# Patient Record
Sex: FEMALE | Race: WHITE | NOT HISPANIC OR LATINO
[De-identification: names, ages, dates, MRNs, and addresses within clinical notes are randomized per-mention and may not be internally consistent; named-entity substitution may affect disease eponyms.]

---

## 2018-03-20 ENCOUNTER — TRANSCRIPTION ENCOUNTER (OUTPATIENT)
Age: 78
End: 2018-03-20

## 2018-03-20 NOTE — ASU PATIENT PROFILE, ADULT - PMH
Anxiety    History of osteoarthritis  knees  Hypertension    Hypothyroid    Morbid obesity    Type 2 diabetes mellitus    URI (upper respiratory infection)  February 2018

## 2018-03-20 NOTE — ASU PATIENT PROFILE, ADULT - PSH
Elective surgery  insertion of pessary  History of arthroscopy  left knee  S/P T&A (status post tonsillectomy and adenoidectomy)    Status post dilation and curettage    Status post foot surgery  left ankle

## 2018-03-21 ENCOUNTER — OUTPATIENT (OUTPATIENT)
Dept: OUTPATIENT SERVICES | Facility: HOSPITAL | Age: 78
LOS: 1 days | End: 2018-03-21
Payer: MEDICARE

## 2018-03-21 VITALS
SYSTOLIC BLOOD PRESSURE: 126 MMHG | HEART RATE: 54 BPM | RESPIRATION RATE: 16 BRPM | OXYGEN SATURATION: 99 % | DIASTOLIC BLOOD PRESSURE: 56 MMHG

## 2018-03-21 VITALS
HEIGHT: 65 IN | OXYGEN SATURATION: 97 % | SYSTOLIC BLOOD PRESSURE: 152 MMHG | RESPIRATION RATE: 18 BRPM | HEART RATE: 54 BPM | WEIGHT: 273.37 LBS | TEMPERATURE: 98 F | DIASTOLIC BLOOD PRESSURE: 50 MMHG

## 2018-03-21 DIAGNOSIS — Z41.9 ENCOUNTER FOR PROCEDURE FOR PURPOSES OTHER THAN REMEDYING HEALTH STATE, UNSPECIFIED: Chronic | ICD-10-CM

## 2018-03-21 DIAGNOSIS — H25.11 AGE-RELATED NUCLEAR CATARACT, RIGHT EYE: ICD-10-CM

## 2018-03-21 DIAGNOSIS — Z98.890 OTHER SPECIFIED POSTPROCEDURAL STATES: Chronic | ICD-10-CM

## 2018-03-21 DIAGNOSIS — Z90.89 ACQUIRED ABSENCE OF OTHER ORGANS: Chronic | ICD-10-CM

## 2018-03-21 LAB — GLUCOSE BLDC GLUCOMTR-MCNC: 129 MG/DL — HIGH (ref 70–99)

## 2018-03-21 PROCEDURE — V2632: CPT

## 2018-03-21 PROCEDURE — 66984 XCAPSL CTRC RMVL W/O ECP: CPT | Mod: RT

## 2018-03-21 PROCEDURE — 82962 GLUCOSE BLOOD TEST: CPT

## 2018-03-21 NOTE — ASU DISCHARGE PLAN (ADULT/PEDIATRIC). - NOTIFY
Swelling that continues/Fever greater than 101/Pain not relieved by Medications/Persistent Nausea and Vomiting/Bleeding that does not stop

## 2018-03-21 NOTE — ASU DISCHARGE PLAN (ADULT/PEDIATRIC). - SPECIAL INSTRUCTIONS
Vigamox 1 drop right eye 4x/day  Prolensa 1 drop right eye 1x/day  PredForte of Durezol 1 drop right eye 4x/day

## 2018-05-30 NOTE — H&P ADULT - ASSESSMENT
76 yo F with PMHx of HTN,HLD, DM, has been c/o SHANKS. recent stress test showed apical wall ischemia. Pt referred for LHC with possible intervention.

## 2018-05-30 NOTE — H&P ADULT - PROBLEM SELECTOR PLAN 1
Pt is referred for Lt heart cath/possible PCI. Labs & medications are reviewed. Informed consent obtained after discussion of [facility] risks, benefits and alternatives  with patient. Risk discussed included, but not limited to MI, stroke, mortality, major bleeding, arrhythmia, or infection.  An educational material provided. Pt. verbalizes understandings of pre-procedural instructions.

## 2018-05-31 ENCOUNTER — OUTPATIENT (OUTPATIENT)
Dept: OUTPATIENT SERVICES | Facility: HOSPITAL | Age: 78
LOS: 1 days | Discharge: HOME | End: 2018-05-31

## 2018-05-31 VITALS
HEIGHT: 65 IN | WEIGHT: 265 LBS | SYSTOLIC BLOOD PRESSURE: 156 MMHG | TEMPERATURE: 98 F | DIASTOLIC BLOOD PRESSURE: 67 MMHG | HEART RATE: 55 BPM | OXYGEN SATURATION: 99 % | RESPIRATION RATE: 16 BRPM

## 2018-05-31 VITALS — SYSTOLIC BLOOD PRESSURE: 135 MMHG | DIASTOLIC BLOOD PRESSURE: 54 MMHG

## 2018-05-31 DIAGNOSIS — Z98.890 OTHER SPECIFIED POSTPROCEDURAL STATES: Chronic | ICD-10-CM

## 2018-05-31 DIAGNOSIS — Z90.89 ACQUIRED ABSENCE OF OTHER ORGANS: Chronic | ICD-10-CM

## 2018-05-31 DIAGNOSIS — Z41.9 ENCOUNTER FOR PROCEDURE FOR PURPOSES OTHER THAN REMEDYING HEALTH STATE, UNSPECIFIED: Chronic | ICD-10-CM

## 2018-05-31 LAB
ANION GAP SERPL CALC-SCNC: 9 MMOL/L — SIGNIFICANT CHANGE UP (ref 5–17)
BUN SERPL-MCNC: 10 MG/DL — SIGNIFICANT CHANGE UP (ref 7–23)
CALCIUM SERPL-MCNC: 9.2 MG/DL — SIGNIFICANT CHANGE UP (ref 8.5–10.1)
CHLORIDE SERPL-SCNC: 103 MMOL/L — SIGNIFICANT CHANGE UP (ref 96–108)
CO2 SERPL-SCNC: 28 MMOL/L — SIGNIFICANT CHANGE UP (ref 22–31)
CREAT SERPL-MCNC: 0.63 MG/DL — SIGNIFICANT CHANGE UP (ref 0.5–1.3)
GLUCOSE SERPL-MCNC: 165 MG/DL — HIGH (ref 70–99)
HCT VFR BLD CALC: 40.9 % — SIGNIFICANT CHANGE UP (ref 34.5–45)
HGB BLD-MCNC: 13.5 G/DL — SIGNIFICANT CHANGE UP (ref 11.5–15.5)
MCHC RBC-ENTMCNC: 28.8 PG — SIGNIFICANT CHANGE UP (ref 27–34)
MCHC RBC-ENTMCNC: 33 GM/DL — SIGNIFICANT CHANGE UP (ref 32–36)
MCV RBC AUTO: 87.4 FL — SIGNIFICANT CHANGE UP (ref 80–100)
NRBC # BLD: 0 /100 WBCS — SIGNIFICANT CHANGE UP (ref 0–0)
PLATELET # BLD AUTO: 208 K/UL — SIGNIFICANT CHANGE UP (ref 150–400)
POTASSIUM SERPL-MCNC: 3.7 MMOL/L — SIGNIFICANT CHANGE UP (ref 3.5–5.3)
POTASSIUM SERPL-SCNC: 3.7 MMOL/L — SIGNIFICANT CHANGE UP (ref 3.5–5.3)
RBC # BLD: 4.68 M/UL — SIGNIFICANT CHANGE UP (ref 3.8–5.2)
RBC # FLD: 13.8 % — SIGNIFICANT CHANGE UP (ref 10.3–14.5)
SODIUM SERPL-SCNC: 140 MMOL/L — SIGNIFICANT CHANGE UP (ref 135–145)
WBC # BLD: 9.03 K/UL — SIGNIFICANT CHANGE UP (ref 3.8–10.5)
WBC # FLD AUTO: 9.03 K/UL — SIGNIFICANT CHANGE UP (ref 3.8–10.5)

## 2018-05-31 NOTE — PACU DISCHARGE NOTE - COMMENTS
pt for d/c home.  d/c instructions along with medication changes reviewed with patient and daughter.  Patient verbalizes understanding.  IV removed from left hand.  pt escorted by daughter

## 2018-05-31 NOTE — CHART NOTE - NSCHARTNOTEFT_GEN_A_CORE
Nurse Practitioner Progress note:     HPI:  78 yo F with PMHx of HTN,HLD, DM, has been c/o SHANKS. recent stress test showed apical wall ischemia. Pt referred for R&LHC with possible intervention.     s/p Rt & LHC : Normal coronaries, mod pul HTN, PCWP 24.  Pt denies chest pain/SOB/palpitations.    PHYSICAL EXAM:  V/S:  BP           HR         RR  Neurologic: Non-focal, A&Ox3.  No neuro deficits  Cardiac : (+)S1S2,RRR  Lungs: CTA B/L  Procedure Site: Rt. femoral perclose closure device noted. site benign soft no bleeding no hematoma 2+DP      PLAN: 	  -VS, labs, diet, activity as per post cath orders  -Encourage PO fluids  -Continue current medications  -Pt. to be discharged home today  -Plan of care D/W pt. and Dr. De Leon  -Post cath instructions reviewed with pt. then pt. verbalizes understanding   -Follow-up with Dr. Ibanez in 1-2 weeks.

## 2018-06-05 DIAGNOSIS — I20.9 ANGINA PECTORIS, UNSPECIFIED: ICD-10-CM

## 2018-06-05 DIAGNOSIS — I50.32 CHRONIC DIASTOLIC (CONGESTIVE) HEART FAILURE: ICD-10-CM

## 2018-06-05 DIAGNOSIS — F41.9 ANXIETY DISORDER, UNSPECIFIED: ICD-10-CM

## 2018-06-05 DIAGNOSIS — I10 ESSENTIAL (PRIMARY) HYPERTENSION: ICD-10-CM

## 2018-06-05 DIAGNOSIS — E66.01 MORBID (SEVERE) OBESITY DUE TO EXCESS CALORIES: ICD-10-CM

## 2018-06-05 DIAGNOSIS — E11.9 TYPE 2 DIABETES MELLITUS WITHOUT COMPLICATIONS: ICD-10-CM

## 2018-06-05 DIAGNOSIS — I27.20 PULMONARY HYPERTENSION, UNSPECIFIED: ICD-10-CM

## 2018-06-05 DIAGNOSIS — E03.9 HYPOTHYROIDISM, UNSPECIFIED: ICD-10-CM

## 2018-09-26 PROBLEM — Z87.39 PERSONAL HISTORY OF OTHER DISEASES OF THE MUSCULOSKELETAL SYSTEM AND CONNECTIVE TISSUE: Chronic | Status: ACTIVE | Noted: 2018-03-21

## 2018-09-26 PROBLEM — E03.9 HYPOTHYROIDISM, UNSPECIFIED: Chronic | Status: ACTIVE | Noted: 2018-03-21

## 2018-09-26 PROBLEM — I10 ESSENTIAL (PRIMARY) HYPERTENSION: Chronic | Status: ACTIVE | Noted: 2018-03-21

## 2018-09-26 PROBLEM — E11.9 TYPE 2 DIABETES MELLITUS WITHOUT COMPLICATIONS: Chronic | Status: ACTIVE | Noted: 2018-03-21

## 2018-09-26 PROBLEM — F41.9 ANXIETY DISORDER, UNSPECIFIED: Chronic | Status: ACTIVE | Noted: 2018-03-21

## 2018-09-26 PROBLEM — E66.01 MORBID (SEVERE) OBESITY DUE TO EXCESS CALORIES: Chronic | Status: ACTIVE | Noted: 2018-03-21

## 2018-10-02 ENCOUNTER — TRANSCRIPTION ENCOUNTER (OUTPATIENT)
Age: 78
End: 2018-10-02

## 2018-10-02 NOTE — ASU PATIENT PROFILE, ADULT - PMH
Anxiety    History of osteoarthritis  knees  Hypertension    Hypothyroid    Morbid obesity    Type 2 diabetes mellitus    URI (upper respiratory infection)  February 2018 Anxiety    History of osteoarthritis  knees  Hypertension    Hypothyroid    Morbid obesity    Panic attacks    Type 2 diabetes mellitus    URI (upper respiratory infection)  February 2018

## 2018-10-02 NOTE — ASU PATIENT PROFILE, ADULT - PSH
Elective surgery  insertion of pessary  History of arthroscopy  left knee  S/P T&A (status post tonsillectomy and adenoidectomy)    Status post dilation and curettage    Status post foot surgery  left ankle Elective surgery  insertion of pessary  History of arthroscopy  left knee  S/P right cataract extraction    S/P T&A (status post tonsillectomy and adenoidectomy)    Status post dilation and curettage    Status post foot surgery  left ankle

## 2018-10-03 ENCOUNTER — OUTPATIENT (OUTPATIENT)
Dept: OUTPATIENT SERVICES | Facility: HOSPITAL | Age: 78
LOS: 1 days | End: 2018-10-03
Payer: MEDICARE

## 2018-10-03 VITALS
HEART RATE: 51 BPM | SYSTOLIC BLOOD PRESSURE: 161 MMHG | DIASTOLIC BLOOD PRESSURE: 44 MMHG | HEIGHT: 64 IN | TEMPERATURE: 99 F | WEIGHT: 265.44 LBS | RESPIRATION RATE: 23 BRPM | OXYGEN SATURATION: 92 %

## 2018-10-03 VITALS
OXYGEN SATURATION: 94 % | RESPIRATION RATE: 17 BRPM | DIASTOLIC BLOOD PRESSURE: 50 MMHG | SYSTOLIC BLOOD PRESSURE: 140 MMHG | HEART RATE: 56 BPM

## 2018-10-03 DIAGNOSIS — Z98.890 OTHER SPECIFIED POSTPROCEDURAL STATES: Chronic | ICD-10-CM

## 2018-10-03 DIAGNOSIS — Z90.89 ACQUIRED ABSENCE OF OTHER ORGANS: Chronic | ICD-10-CM

## 2018-10-03 DIAGNOSIS — Z41.9 ENCOUNTER FOR PROCEDURE FOR PURPOSES OTHER THAN REMEDYING HEALTH STATE, UNSPECIFIED: Chronic | ICD-10-CM

## 2018-10-03 DIAGNOSIS — Z98.41 CATARACT EXTRACTION STATUS, RIGHT EYE: Chronic | ICD-10-CM

## 2018-10-03 DIAGNOSIS — H43.11 VITREOUS HEMORRHAGE, RIGHT EYE: ICD-10-CM

## 2018-10-03 DIAGNOSIS — H25.012 CORTICAL AGE-RELATED CATARACT, LEFT EYE: ICD-10-CM

## 2018-10-03 LAB — GLUCOSE BLDC GLUCOMTR-MCNC: 130 MG/DL — HIGH (ref 70–99)

## 2018-10-03 PROCEDURE — 82962 GLUCOSE BLOOD TEST: CPT

## 2018-10-03 PROCEDURE — 66984 XCAPSL CTRC RMVL W/O ECP: CPT | Mod: LT

## 2018-10-03 PROCEDURE — V2632: CPT

## 2018-10-03 NOTE — ASU DISCHARGE PLAN (ADULT/PEDIATRIC). - SPECIAL INSTRUCTIONS
Vigamox 1 drop left eye 4x/day  Ilevro or Prolensa 1 drop left eye 1x/day  PredForte 1 drop left eye 4x/day

## 2018-10-03 NOTE — ASU DISCHARGE PLAN (ADULT/PEDIATRIC). - NOTIFY
Swelling that continues/Fever greater than 101/Pain not relieved by Medications/Bleeding that does not stop/Persistent Nausea and Vomiting

## 2019-08-28 PROBLEM — F41.0 PANIC DISORDER [EPISODIC PAROXYSMAL ANXIETY]: Chronic | Status: ACTIVE | Noted: 2018-10-03

## 2019-09-10 ENCOUNTER — APPOINTMENT (OUTPATIENT)
Age: 79
End: 2019-09-10
Payer: MEDICARE

## 2019-09-10 ENCOUNTER — RESULT CHARGE (OUTPATIENT)
Age: 79
End: 2019-09-10

## 2019-09-10 VITALS — OXYGEN SATURATION: 94 % | HEART RATE: 62 BPM | DIASTOLIC BLOOD PRESSURE: 70 MMHG | SYSTOLIC BLOOD PRESSURE: 168 MMHG

## 2019-09-10 DIAGNOSIS — Z78.9 OTHER SPECIFIED HEALTH STATUS: ICD-10-CM

## 2019-09-10 LAB
BILIRUB UR QL STRIP: NORMAL
GLUCOSE UR-MCNC: NORMAL
HCG UR QL: 1 EU/DL
HGB UR QL STRIP.AUTO: ABNORMAL
KETONES UR-MCNC: NORMAL
LEUKOCYTE ESTERASE UR QL STRIP: NORMAL
NITRITE UR QL STRIP: NORMAL
PH UR STRIP: 7.5
PROT UR STRIP-MCNC: ABNORMAL
SP GR UR STRIP: 1.01

## 2019-09-10 PROCEDURE — 99204 OFFICE O/P NEW MOD 45 MIN: CPT | Mod: 25

## 2019-09-10 PROCEDURE — 81003 URINALYSIS AUTO W/O SCOPE: CPT | Mod: QW

## 2019-09-10 NOTE — END OF VISIT
[FreeTextEntry3] : She will trial Myrbetriq 25mg and Toviaz 8mg, and a urine testing is sent. She will follow up with urodynamics and cystoscopy and a renal and lateral son her orders will

## 2019-09-10 NOTE — PHYSICAL EXAM
[Normal Appearance] : normal appearance [General Appearance - Well Nourished] : well nourished [General Appearance - Well Developed] : well developed [General Appearance - In No Acute Distress] : no acute distress [Well Groomed] : well groomed [Edema] : no peripheral edema [Abdomen Soft] : soft [Exaggerated Use Of Accessory Muscles For Inspiration] : no accessory muscle use [Respiration, Rhythm And Depth] : normal respiratory rhythm and effort [Abdomen Tenderness] : non-tender [Costovertebral Angle Tenderness] : no ~M costovertebral angle tenderness [FreeTextEntry1] : Aside from some age related changes, the pelvic exam was ressonably normal [Urinary Bladder Findings] : the bladder was normal on palpation [] : no rash [Normal Station and Gait] : the gait and station were normal for the patient's age [Oriented To Time, Place, And Person] : oriented to person, place, and time [No Focal Deficits] : no focal deficits [Affect] : the affect was normal [Not Anxious] : not anxious [Mood] : the mood was normal [No Palpable Adenopathy] : no palpable adenopathy

## 2019-09-10 NOTE — HISTORY OF PRESENT ILLNESS
[FreeTextEntry1] : This patient has incontinence which seems both urgency and stress related. She leaks urine when rising from sitting position and medication with urgency. She has been on oxybutynin but has not noted any real improvement on this medication. She has been receiving periurethral injections of a filler substance without results and has been doing this for one year

## 2019-09-10 NOTE — REVIEW OF SYSTEMS
[Feeling Tired] : feeling tired [Feeling Poorly] : feeling poorly [Genital yeast infection] : genital yeast infection [Shortness Of Breath] : shortness of breath [Date of last menstrual period ____] : date of last menstrual period: [unfilled] [Seen by urologist before (Name)  ___] : Previously seen by a urologist: [unfilled] [Wake up at night to urinate  How many times?  ___] : wakes up to urinate [unfilled] times during the night [Urine Infection (bladder/kidney)] : bladder/kidney infection [Leakage of urine with straining, coughing, laughing] : leakage of urine with straining, coughing, laughing [Leakage of urine with urgency] : leakage of urine with urgency [Joint Pain] : joint pain [Unaware of when urine is leaking] : unaware of when urine is leaking [see HPI] : see HPI [Negative] : Musculoskeletal

## 2019-10-01 ENCOUNTER — APPOINTMENT (OUTPATIENT)
Age: 79
End: 2019-10-01
Payer: MEDICARE

## 2019-10-01 VITALS — OXYGEN SATURATION: 98 % | HEART RATE: 59 BPM | SYSTOLIC BLOOD PRESSURE: 144 MMHG | DIASTOLIC BLOOD PRESSURE: 70 MMHG

## 2019-10-01 PROCEDURE — 52000 CYSTOURETHROSCOPY: CPT

## 2019-10-06 LAB — CORE LAB BIOPSY: NORMAL

## 2019-10-11 ENCOUNTER — APPOINTMENT (OUTPATIENT)
Age: 79
End: 2019-10-11
Payer: MEDICARE

## 2019-10-11 ENCOUNTER — TRANSCRIPTION ENCOUNTER (OUTPATIENT)
Age: 79
End: 2019-10-11

## 2019-10-11 PROCEDURE — 51797 INTRAABDOMINAL PRESSURE TEST: CPT

## 2019-10-11 PROCEDURE — 51784 ANAL/URINARY MUSCLE STUDY: CPT

## 2019-10-11 PROCEDURE — 51741 ELECTRO-UROFLOWMETRY FIRST: CPT

## 2019-10-11 PROCEDURE — 51798 US URINE CAPACITY MEASURE: CPT | Mod: 59

## 2019-10-11 PROCEDURE — 51728 CYSTOMETROGRAM W/VP: CPT

## 2020-01-30 ENCOUNTER — APPOINTMENT (OUTPATIENT)
Dept: UROGYNECOLOGY | Facility: CLINIC | Age: 80
End: 2020-01-30
Payer: MEDICARE

## 2020-01-30 VITALS
BODY MASS INDEX: 43.49 KG/M2 | WEIGHT: 261 LBS | DIASTOLIC BLOOD PRESSURE: 72 MMHG | SYSTOLIC BLOOD PRESSURE: 148 MMHG | HEIGHT: 65 IN

## 2020-01-30 DIAGNOSIS — Z86.39 PERSONAL HISTORY OF OTHER ENDOCRINE, NUTRITIONAL AND METABOLIC DISEASE: ICD-10-CM

## 2020-01-30 DIAGNOSIS — Z86.79 PERSONAL HISTORY OF OTHER DISEASES OF THE CIRCULATORY SYSTEM: ICD-10-CM

## 2020-01-30 DIAGNOSIS — Z86.69 PERSONAL HISTORY OF OTHER DISEASES OF THE NERVOUS SYSTEM AND SENSE ORGANS: ICD-10-CM

## 2020-01-30 LAB
BILIRUB UR QL STRIP: NEGATIVE
CLARITY UR: CLEAR
COLLECTION METHOD: NORMAL
GLUCOSE UR-MCNC: NEGATIVE
HCG UR QL: 0.2 EU/DL
HGB UR QL STRIP.AUTO: NORMAL
KETONES UR-MCNC: NORMAL
LEUKOCYTE ESTERASE UR QL STRIP: NEGATIVE
NITRITE UR QL STRIP: NEGATIVE
PH UR STRIP: 6
PROT UR STRIP-MCNC: NEGATIVE
SP GR UR STRIP: 1.02

## 2020-01-30 PROCEDURE — 99204 OFFICE O/P NEW MOD 45 MIN: CPT | Mod: 25

## 2020-01-30 PROCEDURE — 51701 INSERT BLADDER CATHETER: CPT

## 2020-01-30 PROCEDURE — 81003 URINALYSIS AUTO W/O SCOPE: CPT | Mod: QW

## 2020-01-30 RX ORDER — FESOTERODINE FUMARATE 8 MG/1
8 TABLET, FILM COATED, EXTENDED RELEASE ORAL
Qty: 7 | Refills: 2 | Status: DISCONTINUED | OUTPATIENT
Start: 2019-09-10 | End: 2020-01-30

## 2020-01-30 RX ORDER — MIRABEGRON 25 MG/1
25 TABLET, FILM COATED, EXTENDED RELEASE ORAL
Qty: 7 | Refills: 0 | Status: DISCONTINUED | OUTPATIENT
Start: 2019-09-10 | End: 2020-01-30

## 2020-01-30 NOTE — OB HISTORY
[Vaginal ___] : [unfilled] vaginal delivery(s) [Definite ___ (Date)] : the last menstrual period was [unfilled] [Last Pap Smear ___] : date of last pap smear was on [unfilled] [Sexually Active] : is not sexually active [Taking Estrogens] : is not taking estrogen replacement [Abnormal Pap Smear] : normal pap smear [FreeTextEntry1] : Largest baby 10lbs.

## 2020-01-30 NOTE — DISCUSSION/SUMMARY
[FreeTextEntry1] : Mrs. BELCHER is an 79-year-old with mixed urinary incontinence, MARITZA shown on urodynamics, failed bulking injection, currently on anticholinergics, small asymptomatic rectocele, urgency and nocturia. We talked about the types of urinary incontinence and the treatment options. We reviewed physical therapy, medication, surgery, vaginal inserts and third line treatments. I sent her urine to the lab. I gave her some literature on pelvic muscle strengthening and mid urethral slings. We discussed how her weight and her comorbid medical conditions can make the sling slightly less successful but nonetheless I think she is an appropriate candidate for it. I was able to answer all of her questions.\par

## 2020-01-30 NOTE — LETTER BODY
[Thank you very much for allowing me to participate in the care of this patient. If you have any questions, please do not hesitate to contact me] : Thank you very much for allowing me to participate in the care of this patient. If you have any questions, please do not hesitate to contact me. [Attached please find my note.] : Attached please find my note. [Dear  ___] : Dear  [unfilled], [DrGunner  ___] : Dr. MACIAS

## 2020-01-30 NOTE — PHYSICAL EXAM
[No Acute Distress] : in no acute distress [Well Nourished] : ~L well nourished [Well developed] : well developed [Oriented x3] : oriented to person, place, and time [No Edema] : ~T edema was not present [Warm and Dry] : was warm and dry to touch [Normal Gait] : gait was normal [Normal Appearance] : general appearance was normal [2] : 2 [Aa ____] : Aa [unfilled] [Ba ____] : Ba [unfilled] [C ____] : C [unfilled] [GH ____] : GH [unfilled] [TVL ____] : TVL  [unfilled] [PB ____] : PB [unfilled] [Ap ____] : Ap [unfilled] [Bp ____] : Bp [unfilled] [D ____] : D [unfilled] [Normal] : no abnormalities [Post Void Residual ____ml] : post void residual via catheterization was [unfilled] ml [Tenderness] : ~T no ~M abdominal tenderness observed [Cough] : no cough [FreeTextEntry3] : +ve empty stress test [Hernia] : no hernia observed [Distended] : not distended

## 2020-01-30 NOTE — HISTORY OF PRESENT ILLNESS
[FreeTextEntry1] : 80 yo  female with complaints of leaking of urine. has been happening for years. Leaks with activity like cough or sneeze and with urge. Also has leaking without any precipitating activity. Had periurethral bulking 3 times and it helped a little but then would wear off. Has been on oxybutinin 15mg ER for many years. Was switched to Toviaz and Myrbetriq but found that the oxybutynin alone worked better. Feels she empties fully. Denies daytime frequency. Gets up 3-4 times at night. No prolapse. No bowel complaints.

## 2020-01-31 LAB
APPEARANCE: CLEAR
BACTERIA: NEGATIVE
BILIRUBIN URINE: NEGATIVE
BLOOD URINE: ABNORMAL
COLOR: YELLOW
GLUCOSE QUALITATIVE U: NEGATIVE
HYALINE CASTS: 2 /LPF
KETONES URINE: NEGATIVE
LEUKOCYTE ESTERASE URINE: NEGATIVE
MICROSCOPIC-UA: NORMAL
NITRITE URINE: NEGATIVE
PH URINE: 6.5
PROTEIN URINE: ABNORMAL
RED BLOOD CELLS URINE: 3 /HPF
SPECIFIC GRAVITY URINE: 1.02
SQUAMOUS EPITHELIAL CELLS: 4 /HPF
UROBILINOGEN URINE: NORMAL
WHITE BLOOD CELLS URINE: 1 /HPF

## 2020-02-03 LAB
BACTERIA UR CULT: NORMAL
URINE CYTOLOGY: NORMAL

## 2020-04-13 ENCOUNTER — APPOINTMENT (OUTPATIENT)
Dept: UROGYNECOLOGY | Facility: HOSPITAL | Age: 80
End: 2020-04-13

## 2020-04-30 ENCOUNTER — APPOINTMENT (OUTPATIENT)
Dept: UROGYNECOLOGY | Facility: CLINIC | Age: 80
End: 2020-04-30

## 2020-05-07 ENCOUNTER — APPOINTMENT (OUTPATIENT)
Dept: UROGYNECOLOGY | Facility: CLINIC | Age: 80
End: 2020-05-07

## 2020-05-28 ENCOUNTER — APPOINTMENT (OUTPATIENT)
Dept: UROGYNECOLOGY | Facility: CLINIC | Age: 80
End: 2020-05-28

## 2020-07-02 ENCOUNTER — APPOINTMENT (OUTPATIENT)
Dept: UROGYNECOLOGY | Facility: CLINIC | Age: 80
End: 2020-07-02
Payer: MEDICARE

## 2020-07-02 ENCOUNTER — RESULT REVIEW (OUTPATIENT)
Age: 80
End: 2020-07-02

## 2020-07-02 ENCOUNTER — OUTPATIENT (OUTPATIENT)
Dept: OUTPATIENT SERVICES | Facility: HOSPITAL | Age: 80
LOS: 1 days | End: 2020-07-02
Payer: MEDICARE

## 2020-07-02 VITALS
HEART RATE: 54 BPM | WEIGHT: 262.57 LBS | RESPIRATION RATE: 14 BRPM | HEIGHT: 63 IN | OXYGEN SATURATION: 100 % | SYSTOLIC BLOOD PRESSURE: 182 MMHG | DIASTOLIC BLOOD PRESSURE: 53 MMHG | TEMPERATURE: 98 F

## 2020-07-02 DIAGNOSIS — Z98.890 OTHER SPECIFIED POSTPROCEDURAL STATES: Chronic | ICD-10-CM

## 2020-07-02 DIAGNOSIS — Z98.49 CATARACT EXTRACTION STATUS, UNSPECIFIED EYE: Chronic | ICD-10-CM

## 2020-07-02 DIAGNOSIS — Z98.41 CATARACT EXTRACTION STATUS, RIGHT EYE: Chronic | ICD-10-CM

## 2020-07-02 DIAGNOSIS — N39.3 STRESS INCONTINENCE (FEMALE) (MALE): ICD-10-CM

## 2020-07-02 DIAGNOSIS — Z41.9 ENCOUNTER FOR PROCEDURE FOR PURPOSES OTHER THAN REMEDYING HEALTH STATE, UNSPECIFIED: Chronic | ICD-10-CM

## 2020-07-02 DIAGNOSIS — Z90.89 ACQUIRED ABSENCE OF OTHER ORGANS: Chronic | ICD-10-CM

## 2020-07-02 DIAGNOSIS — Z01.818 ENCOUNTER FOR OTHER PREPROCEDURAL EXAMINATION: ICD-10-CM

## 2020-07-02 LAB
A1C WITH ESTIMATED AVERAGE GLUCOSE RESULT: 7.3 % — HIGH (ref 4–5.6)
ANION GAP SERPL CALC-SCNC: 1 MMOL/L — LOW (ref 5–17)
APPEARANCE UR: CLEAR — SIGNIFICANT CHANGE UP
APTT BLD: 34.4 SEC — SIGNIFICANT CHANGE UP (ref 27.5–35.5)
BASOPHILS # BLD AUTO: 0.05 K/UL — SIGNIFICANT CHANGE UP (ref 0–0.2)
BASOPHILS NFR BLD AUTO: 0.7 % — SIGNIFICANT CHANGE UP (ref 0–2)
BILIRUB UR-MCNC: ABNORMAL
BUN SERPL-MCNC: 10 MG/DL — SIGNIFICANT CHANGE UP (ref 7–23)
CALCIUM SERPL-MCNC: 9.5 MG/DL — SIGNIFICANT CHANGE UP (ref 8.5–10.1)
CHLORIDE SERPL-SCNC: 106 MMOL/L — SIGNIFICANT CHANGE UP (ref 96–108)
CO2 SERPL-SCNC: 32 MMOL/L — HIGH (ref 22–31)
COLOR SPEC: YELLOW — SIGNIFICANT CHANGE UP
CREAT SERPL-MCNC: 0.64 MG/DL — SIGNIFICANT CHANGE UP (ref 0.5–1.3)
DIFF PNL FLD: ABNORMAL
EOSINOPHIL # BLD AUTO: 0.17 K/UL — SIGNIFICANT CHANGE UP (ref 0–0.5)
EOSINOPHIL NFR BLD AUTO: 2.3 % — SIGNIFICANT CHANGE UP (ref 0–6)
ESTIMATED AVERAGE GLUCOSE: 163 MG/DL — HIGH (ref 68–114)
GLUCOSE SERPL-MCNC: 158 MG/DL — HIGH (ref 70–99)
GLUCOSE UR QL: 50 MG/DL
HCT VFR BLD CALC: 45.2 % — HIGH (ref 34.5–45)
HGB BLD-MCNC: 14.6 G/DL — SIGNIFICANT CHANGE UP (ref 11.5–15.5)
IMM GRANULOCYTES NFR BLD AUTO: 0.1 % — SIGNIFICANT CHANGE UP (ref 0–1.5)
INR BLD: 0.96 RATIO — SIGNIFICANT CHANGE UP (ref 0.88–1.16)
KETONES UR-MCNC: NEGATIVE — SIGNIFICANT CHANGE UP
LEUKOCYTE ESTERASE UR-ACNC: ABNORMAL
LYMPHOCYTES # BLD AUTO: 1.25 K/UL — SIGNIFICANT CHANGE UP (ref 1–3.3)
LYMPHOCYTES # BLD AUTO: 16.6 % — SIGNIFICANT CHANGE UP (ref 13–44)
MCHC RBC-ENTMCNC: 28.3 PG — SIGNIFICANT CHANGE UP (ref 27–34)
MCHC RBC-ENTMCNC: 32.3 GM/DL — SIGNIFICANT CHANGE UP (ref 32–36)
MCV RBC AUTO: 87.6 FL — SIGNIFICANT CHANGE UP (ref 80–100)
MONOCYTES # BLD AUTO: 0.49 K/UL — SIGNIFICANT CHANGE UP (ref 0–0.9)
MONOCYTES NFR BLD AUTO: 6.5 % — SIGNIFICANT CHANGE UP (ref 2–14)
NEUTROPHILS # BLD AUTO: 5.56 K/UL — SIGNIFICANT CHANGE UP (ref 1.8–7.4)
NEUTROPHILS NFR BLD AUTO: 73.8 % — SIGNIFICANT CHANGE UP (ref 43–77)
NITRITE UR-MCNC: NEGATIVE — SIGNIFICANT CHANGE UP
PH UR: 6 — SIGNIFICANT CHANGE UP (ref 5–8)
PLATELET # BLD AUTO: 196 K/UL — SIGNIFICANT CHANGE UP (ref 150–400)
POTASSIUM SERPL-MCNC: 4.1 MMOL/L — SIGNIFICANT CHANGE UP (ref 3.5–5.3)
POTASSIUM SERPL-SCNC: 4.1 MMOL/L — SIGNIFICANT CHANGE UP (ref 3.5–5.3)
PROT UR-MCNC: 30 MG/DL
PROTHROM AB SERPL-ACNC: 11.3 SEC — SIGNIFICANT CHANGE UP (ref 10.6–13.6)
RBC # BLD: 5.16 M/UL — SIGNIFICANT CHANGE UP (ref 3.8–5.2)
RBC # FLD: 13.6 % — SIGNIFICANT CHANGE UP (ref 10.3–14.5)
SODIUM SERPL-SCNC: 139 MMOL/L — SIGNIFICANT CHANGE UP (ref 135–145)
SP GR SPEC: 1.01 — SIGNIFICANT CHANGE UP (ref 1.01–1.02)
UROBILINOGEN FLD QL: 1 MG/DL
WBC # BLD: 7.53 K/UL — SIGNIFICANT CHANGE UP (ref 3.8–10.5)
WBC # FLD AUTO: 7.53 K/UL — SIGNIFICANT CHANGE UP (ref 3.8–10.5)

## 2020-07-02 PROCEDURE — 71046 X-RAY EXAM CHEST 2 VIEWS: CPT | Mod: 26

## 2020-07-02 PROCEDURE — 93005 ELECTROCARDIOGRAM TRACING: CPT

## 2020-07-02 PROCEDURE — 71046 X-RAY EXAM CHEST 2 VIEWS: CPT

## 2020-07-02 PROCEDURE — 85025 COMPLETE CBC W/AUTO DIFF WBC: CPT

## 2020-07-02 PROCEDURE — 80048 BASIC METABOLIC PNL TOTAL CA: CPT

## 2020-07-02 PROCEDURE — 85730 THROMBOPLASTIN TIME PARTIAL: CPT

## 2020-07-02 PROCEDURE — 99214 OFFICE O/P EST MOD 30 MIN: CPT

## 2020-07-02 PROCEDURE — 86901 BLOOD TYPING SEROLOGIC RH(D): CPT

## 2020-07-02 PROCEDURE — 81001 URINALYSIS AUTO W/SCOPE: CPT

## 2020-07-02 PROCEDURE — 36415 COLL VENOUS BLD VENIPUNCTURE: CPT

## 2020-07-02 PROCEDURE — 93010 ELECTROCARDIOGRAM REPORT: CPT

## 2020-07-02 PROCEDURE — 86850 RBC ANTIBODY SCREEN: CPT

## 2020-07-02 PROCEDURE — 86900 BLOOD TYPING SEROLOGIC ABO: CPT

## 2020-07-02 PROCEDURE — G0463: CPT | Mod: 25

## 2020-07-02 PROCEDURE — 87086 URINE CULTURE/COLONY COUNT: CPT

## 2020-07-02 PROCEDURE — 83036 HEMOGLOBIN GLYCOSYLATED A1C: CPT

## 2020-07-02 PROCEDURE — 85610 PROTHROMBIN TIME: CPT

## 2020-07-02 RX ORDER — ROSUVASTATIN CALCIUM 5 MG/1
1 TABLET ORAL
Qty: 0 | Refills: 0 | DISCHARGE

## 2020-07-02 RX ORDER — BROMFENAC 0.76 MG/ML
1 SOLUTION/ DROPS OPHTHALMIC
Qty: 0 | Refills: 0 | DISCHARGE

## 2020-07-02 RX ORDER — METFORMIN HYDROCHLORIDE 850 MG/1
0 TABLET ORAL
Qty: 0 | Refills: 0 | DISCHARGE

## 2020-07-02 RX ORDER — PREDNISOLONE SODIUM PHOSPHATE 1 %
1 DROPS OPHTHALMIC (EYE)
Qty: 0 | Refills: 0 | DISCHARGE

## 2020-07-02 RX ORDER — NEBIVOLOL HYDROCHLORIDE 5 MG/1
1 TABLET ORAL
Qty: 0 | Refills: 0 | DISCHARGE

## 2020-07-02 RX ORDER — PREGABALIN 225 MG/1
1 CAPSULE ORAL
Qty: 0 | Refills: 0 | DISCHARGE

## 2020-07-02 RX ORDER — MOXIFLOXACIN HCL 0.5 %
0 DROPS OPHTHALMIC (EYE)
Qty: 0 | Refills: 0 | DISCHARGE

## 2020-07-02 RX ORDER — AMLODIPINE BESYLATE AND BENAZEPRIL HYDROCHLORIDE 10; 20 MG/1; MG/1
1 CAPSULE ORAL
Qty: 0 | Refills: 0 | DISCHARGE

## 2020-07-02 NOTE — LETTER BODY
[Attached please find my note.] : Attached please find my note. [Dear  ___] : Dear  [unfilled], [DrGunner ___] : Dr. MACIAS [Thank you very much for allowing me to participate in the care of this patient. If you have any questions, please do not hesitate to contact me] : Thank you very much for allowing me to participate in the care of this patient. If you have any questions, please do not hesitate to contact me. [DrGunner  ___] : Dr. MACIAS

## 2020-07-02 NOTE — H&P PST ADULT - NSICDXPASTMEDICALHX_GEN_ALL_CORE_FT
PAST MEDICAL HISTORY:  Ankle swelling b/l    Anxiety     History of osteoarthritis knees    Hypertension     Hypothyroid     Morbid obesity     Panic attacks     Type 2 diabetes mellitus     Urine incontinence

## 2020-07-02 NOTE — HISTORY OF PRESENT ILLNESS
[FreeTextEntry1] : Here to discuss options. She complains of mixed UI. She is still taking the oxybutnin. We discussed treatment options for MARITZA. She has had several periurethral bulking and it only lasted for a short time. We reviewed her UDTs done in October and in the report states that MARITZA demonstrated. We again discussed that slings do nothing for OAB.

## 2020-07-02 NOTE — H&P PST ADULT - HISTORY OF PRESENT ILLNESS
80 yo female presents to PST. c/o urinary incontinence x 4 years. States "it's getting worse". c/o frequent urination not improved with oxybutnin. Denies frequent UTIs. Now coming in for bladder sling.

## 2020-07-02 NOTE — H&P PST ADULT - ASSESSMENT
80 yo scheduled for suburethral sling, cystoscopy on 7/13/2020   with Dr. Clifton    1. UA, urine culture, CBC w diff, BMP, PTT/PT/INR, T&S, hgba1c  2. EKG  3. Medical optimization with PCP Dr ALYCIA Rogers, discuss preop instructions for aspirin with PCP.   4. discussedPST day of procedure instructions. NPO as per instructions from ASU  5. Instructed to increase po fluids the day before surgery. Instructed to hold NSAIDs, fish oil, vitamins & herbal supplements 7 days prior to surgery  6. CXR  7. COVID-19 PCR swab to be done in hospital on 7/10, pt aware.   consent signed & on chart. order entered in computer  8. No metformin 24 hrs prior to surgery. May take bystolic & lotrel, synthroid on morning of procedure with sip of water 80 yo scheduled for suburethral sling, cystoscopy on 7/13/2020   with Dr. Clifton    1. UA, urine culture, CBC w diff, BMP, PTT/PT/INR, T&S, hgba1c  2. EKG  3. Medical optimization with PCP Dr ALYCIA Rogers, discuss preop instructions for aspirin with PCP.   4. discussedPST day of procedure instructions. NPO as per instructions from ASU  5. Instructed to increase po fluids the day before surgery. Instructed to hold NSAIDs, fish oil, vitamins & herbal supplements 7 days prior to surgery  6. CXR  7. COVID-19 PCR swab to be done in hospital on 7/10, pt aware.   consent signed & on chart. order entered in computer  8. No metformin 24 hrs prior to surgery. May take bystolic, escitalopram & lotrel, synthroid on morning of procedure with sip of water 80 yo scheduled for suburethral sling, cystoscopy on 7/13/2020   with Dr. Clifton    1. UA, urine culture, CBC w diff, BMP, PTT/PT/INR, T&S, hgba1c  2. EKG  3. Medical optimization with PCP Dr ALYCIA Rogers, discuss preop instructions for aspirin with PCP.   4. discussed EZ sponges, mupirocin & PST day of procedure instructions. NPO as per instructions from ASU  5. Instructed to increase po fluids the day before surgery. Instructed to hold NSAIDs, fish oil, vitamins & herbal supplements 7 days prior to surgery  6. CXR  7. COVID-19 PCR swab to be done in hospital on 7/10, pt aware.   consent signed & on chart. order entered in computer  8. No metformin 24 hrs prior to surgery. May take bystolic, escitalopram & lotrel, synthroid on morning of procedure with sip of water

## 2020-07-02 NOTE — DISCUSSION/SUMMARY
[FreeTextEntry1] : Ms. Lara presented to the office today for counseling regarding her decision for surgical treatment of her urinary incontinence. All pertinent prior studies including urodynamics were reviewed. She was counseled regarding alternative nonsurgical therapies as well as the prognosis with no intervention.\par \par The patient was advised regarding various surgical options including abdominal, laparoscopic and vaginal approaches, allograft (harvesting ones alone tissue), xenograft (using tissue from donor), Impressa and synthetic grafts. The risks and benefits of surgery using graft insertion (mesh) were fully reviewed. She was informed of the potential for improved durability and the inherent risk of mesh use including but not limited to infection, erosion, pain, pain with intercourse, disturbance in bladder function, any of which may require additional surgery for mesh revision. She is aware that the mesh use and her surgery is a permanent mesh.\par \par The general risks of the surgery were reviewed including but not limited to infection, bleeding, surrounding organ or tissue injury, failure meaning continued leaking, voiding dysfunction, needing to go home with a catheter, and the risks of anesthesia.\par \par The approximate length of the surgery hospital stay and postoperative recovery period were reviewed, including a general overview of the convalescence and postoperative followup.\par \par The patient is aware that learner's (medical students/residents/fellows) may be participating in a pelvic exam under anesthesia.\par \par She verbalized a desire to proceed with surgery. Appropriate informed consent was obtained for sling and cysto. All questions were answered to the patient's satisfaction and we are looking to schedule her.

## 2020-07-02 NOTE — H&P PST ADULT - NSICDXPASTSURGICALHX_GEN_ALL_CORE_FT
PAST SURGICAL HISTORY:  Elective surgery insertion of pessary later removed    H/O colonoscopy     History of esophagogastroduodenoscopy (EGD)     S/P cataract surgery b/l    S/P ORIF (open reduction internal fixation) fracture left ankle    S/P T&A (status post tonsillectomy and adenoidectomy)     Status post dilation and curettage

## 2020-07-03 DIAGNOSIS — N39.3 STRESS INCONTINENCE (FEMALE) (MALE): ICD-10-CM

## 2020-07-03 DIAGNOSIS — Z01.818 ENCOUNTER FOR OTHER PREPROCEDURAL EXAMINATION: ICD-10-CM

## 2020-07-03 LAB
CULTURE RESULTS: SIGNIFICANT CHANGE UP
SPECIMEN SOURCE: SIGNIFICANT CHANGE UP

## 2020-07-10 ENCOUNTER — OUTPATIENT (OUTPATIENT)
Dept: OUTPATIENT SERVICES | Facility: HOSPITAL | Age: 80
LOS: 1 days | End: 2020-07-10
Payer: MEDICARE

## 2020-07-10 DIAGNOSIS — Z98.890 OTHER SPECIFIED POSTPROCEDURAL STATES: Chronic | ICD-10-CM

## 2020-07-10 DIAGNOSIS — Z98.49 CATARACT EXTRACTION STATUS, UNSPECIFIED EYE: Chronic | ICD-10-CM

## 2020-07-10 DIAGNOSIS — Z11.59 ENCOUNTER FOR SCREENING FOR OTHER VIRAL DISEASES: ICD-10-CM

## 2020-07-10 DIAGNOSIS — Z90.89 ACQUIRED ABSENCE OF OTHER ORGANS: Chronic | ICD-10-CM

## 2020-07-10 DIAGNOSIS — Z41.9 ENCOUNTER FOR PROCEDURE FOR PURPOSES OTHER THAN REMEDYING HEALTH STATE, UNSPECIFIED: Chronic | ICD-10-CM

## 2020-07-10 PROCEDURE — U0003: CPT

## 2020-07-11 DIAGNOSIS — Z11.59 ENCOUNTER FOR SCREENING FOR OTHER VIRAL DISEASES: ICD-10-CM

## 2020-07-11 LAB — SARS-COV-2 RNA SPEC QL NAA+PROBE: SIGNIFICANT CHANGE UP

## 2020-07-13 ENCOUNTER — APPOINTMENT (OUTPATIENT)
Dept: UROGYNECOLOGY | Facility: HOSPITAL | Age: 80
End: 2020-07-13

## 2020-07-13 ENCOUNTER — RESULT REVIEW (OUTPATIENT)
Age: 80
End: 2020-07-13

## 2020-07-13 ENCOUNTER — OUTPATIENT (OUTPATIENT)
Dept: INPATIENT UNIT | Facility: HOSPITAL | Age: 80
LOS: 1 days | Discharge: HOME | End: 2020-07-13
Payer: MEDICARE

## 2020-07-13 VITALS
OXYGEN SATURATION: 94 % | RESPIRATION RATE: 18 BRPM | HEART RATE: 64 BPM | DIASTOLIC BLOOD PRESSURE: 67 MMHG | TEMPERATURE: 98 F | SYSTOLIC BLOOD PRESSURE: 160 MMHG

## 2020-07-13 VITALS
OXYGEN SATURATION: 95 % | HEIGHT: 63 IN | SYSTOLIC BLOOD PRESSURE: 144 MMHG | RESPIRATION RATE: 16 BRPM | TEMPERATURE: 99 F | HEART RATE: 67 BPM | WEIGHT: 262.35 LBS | DIASTOLIC BLOOD PRESSURE: 91 MMHG

## 2020-07-13 DIAGNOSIS — Z98.49 CATARACT EXTRACTION STATUS, UNSPECIFIED EYE: Chronic | ICD-10-CM

## 2020-07-13 DIAGNOSIS — Z98.890 OTHER SPECIFIED POSTPROCEDURAL STATES: Chronic | ICD-10-CM

## 2020-07-13 DIAGNOSIS — Z41.9 ENCOUNTER FOR PROCEDURE FOR PURPOSES OTHER THAN REMEDYING HEALTH STATE, UNSPECIFIED: Chronic | ICD-10-CM

## 2020-07-13 DIAGNOSIS — N39.3 STRESS INCONTINENCE (FEMALE) (MALE): ICD-10-CM

## 2020-07-13 DIAGNOSIS — Z90.89 ACQUIRED ABSENCE OF OTHER ORGANS: Chronic | ICD-10-CM

## 2020-07-13 PROCEDURE — C1771: CPT

## 2020-07-13 PROCEDURE — 88305 TISSUE EXAM BY PATHOLOGIST: CPT | Mod: 26

## 2020-07-13 PROCEDURE — 57288 REPAIR BLADDER DEFECT: CPT

## 2020-07-13 PROCEDURE — 88305 TISSUE EXAM BY PATHOLOGIST: CPT

## 2020-07-13 RX ORDER — SODIUM CHLORIDE 9 MG/ML
1000 INJECTION, SOLUTION INTRAVENOUS
Refills: 0 | Status: DISCONTINUED | OUTPATIENT
Start: 2020-07-13 | End: 2020-07-13

## 2020-07-13 RX ORDER — FENTANYL CITRATE 50 UG/ML
50 INJECTION INTRAVENOUS
Refills: 0 | Status: DISCONTINUED | OUTPATIENT
Start: 2020-07-13 | End: 2020-07-13

## 2020-07-13 RX ORDER — OXYCODONE HYDROCHLORIDE 5 MG/1
10 TABLET ORAL ONCE
Refills: 0 | Status: DISCONTINUED | OUTPATIENT
Start: 2020-07-13 | End: 2020-07-13

## 2020-07-13 RX ORDER — ACETAMINOPHEN 500 MG
1000 TABLET ORAL ONCE
Refills: 0 | Status: DISCONTINUED | OUTPATIENT
Start: 2020-07-13 | End: 2020-07-13

## 2020-07-13 RX ORDER — ONDANSETRON 8 MG/1
4 TABLET, FILM COATED ORAL ONCE
Refills: 0 | Status: DISCONTINUED | OUTPATIENT
Start: 2020-07-13 | End: 2020-07-13

## 2020-07-13 NOTE — BRIEF OPERATIVE NOTE - NSICDXBRIEFPOSTOP_GEN_ALL_CORE_FT
POST-OP DIAGNOSIS:  Bladder polyp 13-Jul-2020 11:09:45  Edelmira Joshua  Urinary, incontinence, stress female 13-Jul-2020 11:09:29  Edelmira Joshua

## 2020-07-13 NOTE — ASU PATIENT PROFILE, ADULT - PSH
Elective surgery  insertion of pessary later removed  H/O colonoscopy    History of esophagogastroduodenoscopy (EGD)    S/P cataract surgery  b/l  S/P ORIF (open reduction internal fixation) fracture  left ankle  S/P T&A (status post tonsillectomy and adenoidectomy)    Status post dilation and curettage

## 2020-07-13 NOTE — ASU PATIENT PROFILE, ADULT - NS SC CAGE ALCOHOL ANNOYED YOU
ASSESSMENT & PLAN:  73 year old female admitted with Syncope who has known PAF (o n Eliquis), with past history of CVA, admitted with syncope  troponin negative)and noted to have a 4 second pause on Holter monitor as an outpatient. She is on prednisone for RA.    As per Dr. Sanchez:  Pt to have PPM in am  Hold DOAC  NPO after midnight  Avoid AV Blocking agents ASSESSMENT & PLAN:  73 year old female admitted with Syncope who has known PAF (o n Eliquis), with past history of CVA, admitted with syncope  troponin negative)and noted to have a 4 second pause on Holter monitor as an outpatient. She is on prednisone for RA.    As per Dr. Sanchez:  Pt to have PPM in am  Hold DOAC  NPO after midnight  Avoid AV Blocking agents   Will obtain records from [facility] ASSESSMENT & PLAN:  73 year old female admitted with Syncope and she has known PAF (o n Eliquis), with past history of CVA,  troponin negative)and noted to have a 4 second pause on ILR in January( at 6:23 am).  She isn't on any AV blocking agents or diuretics.  She is on prednisone for RA.   She was requested to have sleep apnea evaluation which she never followed through with. 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        As per Dr. Sanchez:  Pt to have PPM in am  (however pt will be re-evaluated by Dr. Sanchez)  Hold DOAC  NPO after midnight  Avoid AV Blocking agents   Will obtain records from [facility]  Encourage sleep apnea evaluation as an outpatient   obtain  Echo  orthostatic VS ASSESSMENT & PLAN:  73 year old female admitted with Syncope and she has known PAF (o n Eliquis), with past history of CVA,  troponin negative)and noted to have a 4 second pause on ILR in January( at 6:23 am).  She isn't on any AV blocking agents or diuretics.  She is on prednisone for RA.   She was requested to have sleep apnea evaluation which she never followed through with. 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 She started a new diet over that last week which includes multiple OTC Vitamins and a liquid meal replacement.  Her ILR was interrogated and doesn't show any new Bradycardia .       As per Dr. Sanchez:  Pt to have PPM in am  (however pt will be re-evaluated by Dr. Sanchez)  Hold DOAC  NPO after midnight  Avoid AV Blocking agents   Will obtain records from [facility]  Encourage sleep apnea evaluation as an outpatient   obtain  Echo  orthostatic VS ASSESSMENT & PLAN:  73 year old female admitted with Syncope and she has known PAF (o n Eliquis), with past history of CVA,  troponin negative)and noted to have a 4 second pause on ILR in January( at 6:23 am).  She isn't on any AV blocking agents or diuretics.  She is on prednisone for RA.   She was requested to have sleep apnea evaluation which she never followed through with. 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 She started a new diet over that last week which includes multiple OTC Vitamins and a liquid meal replacement.  Her ILR was interrogated and doesn't show any new Bradycardia .   EF  55% (jan 2019- Dr. Beck's office.    As per Dr. Sanchez:  Pt to have PPM in am  (however pt will be re-evaluated by Dr. Sanchez)  Hold DOAC  NPO after midnight  Avoid AV Blocking agents   Will obtain records from [facility]  Encourage sleep apnea evaluation as an outpatient   ECHO results fro, out pt office show EF 55%  orthostatic VS no

## 2020-07-13 NOTE — ASU DISCHARGE PLAN (ADULT/PEDIATRIC) - CARE PROVIDER_API CALL
Bruno Clifton  OBSTETRICS AND GYNECOLOGY  [address]  [place] [address]  Phone: [phone]  Fax: [phone]  Established Patient  Follow Up Time:

## 2020-07-13 NOTE — BRIEF OPERATIVE NOTE - NSICDXBRIEFPROCEDURE_GEN_ALL_CORE_FT
PROCEDURES:  Bladder biopsy 13-Jul-2020 11:09:56  Edelmira Joshua  Creation of midurethral sling with cystoscopy 13-Jul-2020 11:08:09  Edelmira Joshua

## 2020-07-14 DIAGNOSIS — N39.3 STRESS INCONTINENCE (FEMALE) (MALE): ICD-10-CM

## 2020-07-17 DIAGNOSIS — N39.3 STRESS INCONTINENCE (FEMALE) (MALE): ICD-10-CM

## 2020-07-17 DIAGNOSIS — F32.9 MAJOR DEPRESSIVE DISORDER, SINGLE EPISODE, UNSPECIFIED: ICD-10-CM

## 2020-07-17 DIAGNOSIS — F41.9 ANXIETY DISORDER, UNSPECIFIED: ICD-10-CM

## 2020-07-17 DIAGNOSIS — N32.89 OTHER SPECIFIED DISORDERS OF BLADDER: ICD-10-CM

## 2020-07-17 DIAGNOSIS — Z79.84 LONG TERM (CURRENT) USE OF ORAL HYPOGLYCEMIC DRUGS: ICD-10-CM

## 2020-07-17 DIAGNOSIS — E11.9 TYPE 2 DIABETES MELLITUS WITHOUT COMPLICATIONS: ICD-10-CM

## 2020-07-17 DIAGNOSIS — Z79.82 LONG TERM (CURRENT) USE OF ASPIRIN: ICD-10-CM

## 2020-07-17 DIAGNOSIS — I10 ESSENTIAL (PRIMARY) HYPERTENSION: ICD-10-CM

## 2020-07-17 DIAGNOSIS — E03.9 HYPOTHYROIDISM, UNSPECIFIED: ICD-10-CM

## 2020-07-23 ENCOUNTER — APPOINTMENT (OUTPATIENT)
Dept: UROGYNECOLOGY | Facility: CLINIC | Age: 80
End: 2020-07-23
Payer: MEDICARE

## 2020-07-23 ENCOUNTER — RESULT CHARGE (OUTPATIENT)
Age: 80
End: 2020-07-23

## 2020-07-23 VITALS — DIASTOLIC BLOOD PRESSURE: 76 MMHG | SYSTOLIC BLOOD PRESSURE: 140 MMHG

## 2020-07-23 LAB
BILIRUB UR QL STRIP: NEGATIVE
CLARITY UR: CLEAR
COLLECTION METHOD: NORMAL
GLUCOSE UR-MCNC: 100
HCG UR QL: 1 EU/DL
HGB UR QL STRIP.AUTO: NORMAL
KETONES UR-MCNC: NORMAL
LEUKOCYTE ESTERASE UR QL STRIP: NORMAL
NITRITE UR QL STRIP: NORMAL
PH UR STRIP: 5.5
PROT UR STRIP-MCNC: NORMAL
SP GR UR STRIP: 1.02

## 2020-07-23 PROCEDURE — 99024 POSTOP FOLLOW-UP VISIT: CPT

## 2020-07-23 PROCEDURE — 81003 URINALYSIS AUTO W/O SCOPE: CPT | Mod: QW

## 2020-07-23 NOTE — ASSESSMENT
[FreeTextEntry1] : 80 y/o female 10 days post vaginal sling and cystoscopy pt happy no current concerns . pt to keep scheduled appointment with Dr. Elodia brown pt with no questions or concerns.

## 2020-07-23 NOTE — SUBJECTIVE
[FreeTextEntry1] : pt states good  [FreeTextEntry8] : no changes  [FreeTextEntry7] : denied  [FreeTextEntry6] : decreased  [FreeTextEntry4] : + bm regular  [FreeTextEntry5] : urine flow is good, small leakage during night from laying to sitting up , no leakage with cough , laugh or sneeze no daytime leakage , nocturia x2 [FreeTextEntry3] : good  [FreeTextEntry2] : ok

## 2020-08-20 ENCOUNTER — APPOINTMENT (OUTPATIENT)
Dept: UROGYNECOLOGY | Facility: CLINIC | Age: 80
End: 2020-08-20
Payer: MEDICARE

## 2020-08-20 PROBLEM — R32 UNSPECIFIED URINARY INCONTINENCE: Chronic | Status: ACTIVE | Noted: 2020-07-02

## 2020-08-20 PROBLEM — M25.473 EFFUSION, UNSPECIFIED ANKLE: Chronic | Status: ACTIVE | Noted: 2020-07-02

## 2020-08-20 LAB
BILIRUB UR QL STRIP: NORMAL
CLARITY UR: CLEAR
COLLECTION METHOD: NORMAL
GLUCOSE UR-MCNC: NEGATIVE
HCG UR QL: 1 EU/DL
HGB UR QL STRIP.AUTO: NORMAL
KETONES UR-MCNC: NORMAL
LEUKOCYTE ESTERASE UR QL STRIP: NEGATIVE
NITRITE UR QL STRIP: NEGATIVE
PH UR STRIP: 6
PROT UR STRIP-MCNC: NORMAL
SP GR UR STRIP: 1.02

## 2020-08-20 PROCEDURE — 81003 URINALYSIS AUTO W/O SCOPE: CPT | Mod: QW

## 2020-08-20 PROCEDURE — 99024 POSTOP FOLLOW-UP VISIT: CPT

## 2020-08-20 NOTE — HISTORY OF PRESENT ILLNESS
[FreeTextEntry1] : About 5 weeks s/p sling and bladder biopsy. She had little post op pain that is resolved. She denies any leaking with sneeze or cough. She does have a little leaking when she gets the urge at night on the way to the bathroom. Stream is a little slow but ultimately feels she is emptying. Bx of bladder showed a cyst.

## 2020-08-20 NOTE — PHYSICAL EXAM
[Chaperone Present] : A chaperone was present in the examining room during all aspects of the physical examination [Normal Appearance] : general appearance was normal [Normal] : no abnormalities [FreeTextEntry4] : no exposure graft nontender

## 2020-08-20 NOTE — LETTER BODY
[Dear  ___] : Dear  [unfilled], [Attached please find my note.] : Attached please find my note. [Thank you very much for allowing me to participate in the care of this patient. If you have any questions, please do not hesitate to contact me] : Thank you very much for allowing me to participate in the care of this patient. If you have any questions, please do not hesitate to contact me. [DrGunner  ___] : Dr. MACIAS

## 2020-08-20 NOTE — DISCUSSION/SUMMARY
[FreeTextEntry1] : Doing well s/p sling.  Cleared to gradually return to regular activities. Followup in 4-5 months.

## 2020-09-16 LAB — BACTERIA UR CULT: ABNORMAL

## 2020-09-21 ENCOUNTER — APPOINTMENT (OUTPATIENT)
Dept: UROLOGY | Facility: CLINIC | Age: 80
End: 2020-09-21
Payer: MEDICARE

## 2020-09-21 VITALS
OXYGEN SATURATION: 96 % | SYSTOLIC BLOOD PRESSURE: 176 MMHG | HEART RATE: 60 BPM | BODY MASS INDEX: 44.98 KG/M2 | HEIGHT: 65 IN | WEIGHT: 270 LBS | DIASTOLIC BLOOD PRESSURE: 77 MMHG

## 2020-09-21 PROCEDURE — 99213 OFFICE O/P EST LOW 20 MIN: CPT

## 2020-09-21 NOTE — PHYSICAL EXAM
[General Appearance - Well Developed] : well developed [General Appearance - Well Nourished] : well nourished [Normal Appearance] : normal appearance [Well Groomed] : well groomed [General Appearance - In No Acute Distress] : no acute distress [Abdomen Soft] : soft [Abdomen Tenderness] : non-tender [Costovertebral Angle Tenderness] : no ~M costovertebral angle tenderness [Urinary Bladder Findings] : the bladder was normal on palpation [FreeTextEntry1] : Aside from some age related changes, the pelvic exam was ressonably normal [Edema] : no peripheral edema [] : no respiratory distress [Respiration, Rhythm And Depth] : normal respiratory rhythm and effort [Exaggerated Use Of Accessory Muscles For Inspiration] : no accessory muscle use [Oriented To Time, Place, And Person] : oriented to person, place, and time [Affect] : the affect was normal [Mood] : the mood was normal [Not Anxious] : not anxious [Normal Station and Gait] : the gait and station were normal for the patient's age [No Focal Deficits] : no focal deficits [No Palpable Adenopathy] : no palpable adenopathy

## 2020-09-21 NOTE — HISTORY OF PRESENT ILLNESS
[FreeTextEntry1] : This patient has been on oxybutynin but states her symptoms are worsening.  Her urine analysis done recently and culture shows an infection

## 2020-10-14 ENCOUNTER — APPOINTMENT (OUTPATIENT)
Dept: UROLOGY | Facility: CLINIC | Age: 80
End: 2020-10-14
Payer: MEDICARE

## 2020-10-14 VITALS
BODY MASS INDEX: 44.15 KG/M2 | HEIGHT: 65 IN | WEIGHT: 265 LBS | HEART RATE: 58 BPM | DIASTOLIC BLOOD PRESSURE: 74 MMHG | SYSTOLIC BLOOD PRESSURE: 138 MMHG | OXYGEN SATURATION: 96 %

## 2020-10-14 PROCEDURE — 52285 CYSTOSCOPY AND TREATMENT: CPT

## 2020-12-14 ENCOUNTER — OUTPATIENT (OUTPATIENT)
Dept: OUTPATIENT SERVICES | Facility: HOSPITAL | Age: 80
LOS: 1 days | End: 2020-12-14
Payer: MEDICARE

## 2020-12-14 DIAGNOSIS — Z98.890 OTHER SPECIFIED POSTPROCEDURAL STATES: Chronic | ICD-10-CM

## 2020-12-14 DIAGNOSIS — Z98.49 CATARACT EXTRACTION STATUS, UNSPECIFIED EYE: Chronic | ICD-10-CM

## 2020-12-14 DIAGNOSIS — Z20.828 CONTACT WITH AND (SUSPECTED) EXPOSURE TO OTHER VIRAL COMMUNICABLE DISEASES: ICD-10-CM

## 2020-12-14 DIAGNOSIS — Z41.9 ENCOUNTER FOR PROCEDURE FOR PURPOSES OTHER THAN REMEDYING HEALTH STATE, UNSPECIFIED: Chronic | ICD-10-CM

## 2020-12-14 DIAGNOSIS — Z90.89 ACQUIRED ABSENCE OF OTHER ORGANS: Chronic | ICD-10-CM

## 2020-12-14 PROCEDURE — U0003: CPT

## 2020-12-15 DIAGNOSIS — Z20.828 CONTACT WITH AND (SUSPECTED) EXPOSURE TO OTHER VIRAL COMMUNICABLE DISEASES: ICD-10-CM

## 2020-12-15 LAB — SARS-COV-2 RNA SPEC QL NAA+PROBE: SIGNIFICANT CHANGE UP

## 2021-01-16 ENCOUNTER — RX RENEWAL (OUTPATIENT)
Age: 81
End: 2021-01-16

## 2021-01-21 ENCOUNTER — APPOINTMENT (OUTPATIENT)
Dept: UROGYNECOLOGY | Facility: CLINIC | Age: 81
End: 2021-01-21

## 2021-03-17 NOTE — ASU DISCHARGE PLAN (ADULT/PEDIATRIC). - I HAVE READ AND UNDERSTAND THE ABOVE INSTRUCTIONS AND I UNDERSTAND IT IS IMPORTANT TO FOLLOW THESE INSTRUCTIONS
Patient Name: Cali Santoro  : 1937    MRN: [id number]                              Today's Date: 3/17/2021       Admit Date: 3/14/2021    Visit Dx:     ICD-10-CM ICD-9-CM   1. Shortness of breath  R06.02 786.05   2. Bilateral pleural effusion  J90 511.9   3. Acute kidney injury (CMS/[facility])  N17.9 584.9   4. Hyperglycemia  R73.9 790.29   5. Acute on chronic congestive heart failure, unspecified heart failure type (CMS/[facility])  I50.9 428.0     Patient Active Problem List   Diagnosis   • Permanent atrial fibrillation (CMS/[facility])   • Body mass index (BMI) of 27.0-27.9 in adult   • Chronic obstructive pulmonary disease (CMS/[facility])   • Coronary artery disease of native artery of native heart with stable angina pectoris (CMS/[facility])   • Essential hypertension   • Presence of cardiac pacemaker   • Sleep apnea   • Type 2 diabetes mellitus with hyperglycemia, without long-term current use of insulin (CMS/[facility])   • Vitamin D deficiency   • Stage 3b chronic kidney disease (CMS/[facility])   • Mixed hyperlipidemia   • Pneumonia due to Streptococcus pneumoniae (CMS/[facility])   • Acute on chronic heart failure with preserved ejection fraction (CMS/[facility])   • Acute-on-chronic kidney injury (CMS/[facility])   • Hypoxemia   • HCAP (healthcare-associated pneumonia)   • Anemia due to stage 3 chronic kidney disease (CMS/[facility])   • Shortness of breath   • Sepsis (CMS/[facility])     Past Medical History:   Diagnosis Date   • Arrhythmia    • Arthritis    • Bradycardia    • Cancer (CMS/[facility])    • Cardiomyopathy (CMS/[facility])    • Cataract    • CHF (congestive heart failure) (CMS/[facility])    • COPD (chronic obstructive pulmonary disease) (CMS/[facility])    • Coronary artery disease    • Diabetes mellitus, type 2 (CMS/[facility])    • Emphysema, unspecified (CMS/[facility])    • Hx of sick sinus syndrome     S/P Biventricular Pacemaker   • Hyperlipidemia    • Hypertension    • Paroxysmal atrial fibrillation (CMS/[facility])    • Pneumonia    • Sleep apnea    • Ventricular arrhythmia      Past Surgical  History:   Procedure Laterality Date   • CARDIAC CATHETERIZATION  02/04/2015   • CARDIAC CATHETERIZATION  04/25/2016   • CAROTID ENDARTERECTOMY     • COLONOSCOPY     • CORONARY ANGIOPLASTY  1997   • ENDOSCOPY     • EYE SURGERY     • FRACTURE SURGERY     • HERNIA REPAIR     • JOINT REPLACEMENT     • LARYNGOSCOPY     • LUNG SURGERY  2008    Lung resection   • PACEMAKER IMPLANTATION  04/25/2016    Dual chamber; [employer]   • TRACHEOSTOMY       General Information     Row Name 03/17/21 1608          Physical Therapy Time and Intention    Document Type  evaluation  -     Mode of Treatment  physical therapy  -     Row Name 03/17/21 1608          General Information    Patient Profile Reviewed  yes  -     Prior Level of Function  -- indep with short distance ambulation, wife assists with ADLs and IADLs, home O2, has RW and cane  -     Existing Precautions/Restrictions  oxygen therapy device and L/min  -     Barriers to Rehab  medically complex  -     Row Name 03/17/21 1608          Living Environment    Lives With  spouse wife available 24/7  -     Row Name 03/17/21 1608          Home Main Entrance    Number of Stairs, Main Entrance  two  -     Row Name 03/17/21 1608          Cognition    Orientation Status (Cognition)  oriented x 3  -     Row Name 03/17/21 1608          Safety Issues, Functional Mobility    Impairments Affecting Function (Mobility)  shortness of breath;strength;endurance/activity tolerance;balance  -       User Key  (r) = Recorded By, (t) = Taken By, (c) = Cosigned By    Initials Name Provider Type     Nuvia Ferrera, PT Physical Therapist        Mobility     Row Name 03/17/21 1609          Bed Mobility    Bed Mobility  -- up in chair  -     Row Name 03/17/21 1609          Sit-Stand Transfer    Sit-Stand [place] (Transfers)  contact guard  -     Assistive Device (Sit-Stand Transfers)  walker, front-wheeled  -     Row Name 03/17/21 1609          Gait/Stairs  (Locomotion)    Edgar Level (Gait)  contact guard;minimum assist (75% patient effort)  -     Assistive Device (Gait)  walker, front-wheeled  -     Distance in Feet (Gait)  20 ft x2  -HC     Deviations/Abnormal Patterns (Gait)  gait speed decreased  -     Comment (Gait/Stairs)  assist for navigating RW in small space, fatigues quickly requiring seated rest break  -       User Key  (r) = Recorded By, (t) = Taken By, (c) = Cosigned By    Initials Name Provider Type     Nuvia Ferrera, PT Physical Therapist        Obj/Interventions     Row Name 03/17/21 1609          Range of Motion Comprehensive    Comment, General Range of Motion  BLEs WFL  -     Row Name 03/17/21 1609          Strength Comprehensive (MMT)    Comment, General Manual Muscle Testing (MMT) Assessment  BLEs grossly 4/5  -     Row Name 03/17/21 1609          Balance    Balance Assessment  sitting static balance;sitting dynamic balance;standing static balance;standing dynamic balance  -     Static Sitting Balance  WFL  -HC     Dynamic Sitting Balance  WFL  -HC     Static Standing Balance  WFL  -HC     Dynamic Standing Balance  mild impairment  -     Row Name 03/17/21 1609          Sensory Assessment (Somatosensory)    Sensory Assessment (Somatosensory)  sensation intact  -       User Key  (r) = Recorded By, (t) = Taken By, (c) = Cosigned By    Initials Name Provider Type     Nuvia Ferrera, PT Physical Therapist        Goals/Plan     Row Name 03/17/21 1611          Transfer Goal 1 (PT)    Activity/Assistive Device (Transfer Goal 1, PT)  transfers, all  -     Edgar Level/Cues Needed (Transfer Goal 1, PT)  modified independence  -     Time Frame (Transfer Goal 1, PT)  2 weeks  -     Row Name 03/17/21 1611          Gait Training Goal 1 (PT)    Activity/Assistive Device (Gait Training Goal 1, PT)  gait (walking locomotion);assistive device use  -     Edgar Level (Gait Training Goal 1, PT)  standby assist   -HC     Distance (Gait Training Goal 1, PT)  50 ft while maintaining O2 >88%  -HC     Time Frame (Gait Training Goal 1, PT)  2 weeks  -HC       User Key  (r) = Recorded By, (t) = Taken By, (c) = Cosigned By    Initials Name Provider Type     Nuvia Ferrera, PT Physical Therapist        Clinical Impression     Row Name 03/17/21 1610          Pain    Additional Documentation  Pain Scale: Numbers Pre/Post-Treatment (Group)  -HC     Row Name 03/17/21 1610          Pain Scale: Numbers Pre/Post-Treatment    Pretreatment Pain Rating  0/10 - no pain  -HC     Posttreatment Pain Rating  0/10 - no pain  -HC     Row Name 03/17/21 1610          Plan of Care Review    Outcome Summary  Pt is 84 yo male admitted for SOA, b/l pleural effusion, ZACKERY, acute CHF.  Covid (-).  PMH lung cancer, COPD, CAD, A-fib.  -HC     Row Name 03/17/21 1610          Therapy Assessment/Plan (PT)    Patient/Family Therapy Goals Statement (PT)  return home with wife and possible Hospice  -     Rehab Potential (PT)  good, to achieve stated therapy goals  -     Criteria for Skilled Interventions Met (PT)  yes;skilled treatment is necessary  -HC     Predicted Duration of Therapy Intervention (PT)  until d/c  -HC     Row Name 03/17/21 1610          Vital Signs    Intra SpO2 (%)  88  -HC     O2 Delivery Intra Treatment  supplemental O2 6L  -HC     Post SpO2 (%)  93  -HC     O2 Delivery Post Treatment  supplemental O2 6L  -HC     Row Name 03/17/21 1610          Positioning and Restraints    Pre-Treatment Position  sitting in chair/recliner  -HC     Post Treatment Position  chair  -HC     In Chair  notified nsg;call light within reach;encouraged to call for assist wife present  -HC       User Key  (r) = Recorded By, (t) = Taken By, (c) = Cosigned By    Initials Name Provider Type     Nuvia Ferrera, PT Physical Therapist        Outcome Measures     Row Name 03/17/21 1612          How much help from another person do you currently need...    Turning  from your back to your side while in flat bed without using bedrails?  3  -HC     Moving from lying on back to sitting on the side of a flat bed without bedrails?  3  -HC     Moving to and from a bed to a chair (including a wheelchair)?  3  -HC     Standing up from a chair using your arms (e.g., wheelchair, bedside chair)?  3  -HC     Climbing 3-5 steps with a railing?  3  -HC     To walk in hospital room?  3  -HC     AM-PAC 6 Clicks Score (PT)  18  -HC     Row Name 03/17/21 1612          Functional Assessment    Outcome Measure Options  AM-PAC 6 Clicks Basic Mobility (PT)  -       User Key  (r) = Recorded By, (t) = Taken By, (c) = Cosigned By    Initials Name Provider Type    HC Nuvia Ferrera, PT Physical Therapist        Physical Therapy Education                 Title: PT OT SLP Therapies (Done)     Topic: Physical Therapy (Done)     Point: Mobility training (Done)     Learning Progress Summary           Patient Acceptance, E, VU by  at 3/17/2021 1612   Significant Other Acceptance, E, VU by  at 3/17/2021 1612                   Point: Precautions (Done)     Learning Progress Summary           Patient Acceptance, E, VU by  at 3/17/2021 1612   Significant Other Acceptance, E, VU by  at 3/17/2021 1612                               User Key     Initials Effective Dates Name Provider Type Discipline     04/17/20 -  Nuvia Ferrera, PT Physical Therapist PT              PT Recommendation and Plan  Planned Therapy Interventions (PT): balance training, gait training, strengthening, transfer training, patient/family education, postural re-education, neuromuscular re-education, stair training  Plan of Care Reviewed With: patient  Outcome Summary: Pt is 84 yo male admitted for SOA, b/l pleural effusion, ZACKERY, acute CHF.  Covid (-).  PMH lung cancer, COPD, CAD, A-fib.  Pt with possible decision to go home with Hospice but would like to complete PT while in hospital to maintain strength.  Pt able to complete  transfers with CGA and ambulate short distances using RW with CGA-Kyle.  Pt desats to 88% on 6L O2 with ambulation but increased to >90% with seated rest break and PLB.  Pt has strong support of wife who is available to assist as needed at home.  Education provided on importance of rest breaks, AD as needed for balance.  Plan home with wife.  PT will follow 3x/week while at [facility].  PPE donned: mask, goggles, gloves.     Time Calculation:   PT Charges     Row Name 03/17/21 1615             Time Calculation    Start Time  1510  -HC      Stop Time  1535  -      Time Calculation (min)  25 min  -      PT Received On  03/17/21  -      PT - Next Appointment  03/19/21  -      PT Goal Re-Cert Due Date  03/31/21  -         Time Calculation- PT    Total Timed Code Minutes- PT  8 minute(s)  -HC        User Key  (r) = Recorded By, (t) = Taken By, (c) = Cosigned By    Initials Name Provider Type     Nuvia Ferrera, PT Physical Therapist        Therapy Charges for Today     Code Description Service Date Service Provider Modifiers Qty    [id number]  PT EVAL MOD COMPLEXITY 3 3/17/2021 Nuvia Ferrera, PT GP 1    [id number]  PT THERAPEUTIC ACT EA 15 MIN 3/17/2021 Nuvia Ferrera, PT GP 1          PT G-Codes  Outcome Measure Options: AM-PAC 6 Clicks Basic Mobility (PT)  AM-PAC 6 Clicks Score (PT): 18    Nuvia Ferrera, PT  3/17/2021     Statement Selected

## 2021-08-16 ENCOUNTER — APPOINTMENT (OUTPATIENT)
Dept: UROLOGY | Facility: CLINIC | Age: 81
End: 2021-08-16
Payer: MEDICARE

## 2021-08-16 VITALS
HEART RATE: 65 BPM | SYSTOLIC BLOOD PRESSURE: 154 MMHG | WEIGHT: 264 LBS | RESPIRATION RATE: 17 BRPM | DIASTOLIC BLOOD PRESSURE: 72 MMHG | BODY MASS INDEX: 43.99 KG/M2 | TEMPERATURE: 98 F | HEIGHT: 65 IN

## 2021-08-16 VITALS — OXYGEN SATURATION: 92 %

## 2021-08-16 PROCEDURE — 99213 OFFICE O/P EST LOW 20 MIN: CPT

## 2021-08-19 NOTE — HISTORY OF PRESENT ILLNESS
[FreeTextEntry1] : This patient presents with creasing urgency related incontinence.  She have been doing well for time it is noted that the problem seems to be increasing

## 2021-08-28 DIAGNOSIS — N39.0 URINARY TRACT INFECTION, SITE NOT SPECIFIED: ICD-10-CM

## 2021-08-28 LAB
APPEARANCE: ABNORMAL
BACTERIA: ABNORMAL
BILIRUBIN URINE: NEGATIVE
BLOOD URINE: NEGATIVE
COLOR: YELLOW
GLUCOSE QUALITATIVE U: ABNORMAL
HYALINE CASTS: 0 /LPF
KETONES URINE: NEGATIVE
LEUKOCYTE ESTERASE URINE: NEGATIVE
MICROSCOPIC-UA: NORMAL
NITRITE URINE: POSITIVE
PH URINE: 8
PROTEIN URINE: ABNORMAL
RED BLOOD CELLS URINE: 5 /HPF
SPECIFIC GRAVITY URINE: 1.03
SQUAMOUS EPITHELIAL CELLS: 2 /HPF
URINE COMMENTS: NORMAL
UROBILINOGEN URINE: NORMAL
WHITE BLOOD CELLS URINE: 14 /HPF

## 2021-08-30 LAB — BACTERIA UR CULT: NORMAL

## 2021-08-31 ENCOUNTER — APPOINTMENT (OUTPATIENT)
Dept: UROLOGY | Facility: CLINIC | Age: 81
End: 2021-08-31
Payer: MEDICARE

## 2021-08-31 VITALS
OXYGEN SATURATION: 92 % | DIASTOLIC BLOOD PRESSURE: 61 MMHG | HEART RATE: 62 BPM | SYSTOLIC BLOOD PRESSURE: 144 MMHG | BODY MASS INDEX: 43.99 KG/M2 | WEIGHT: 264 LBS | HEIGHT: 65 IN

## 2021-08-31 DIAGNOSIS — N32.9 BLADDER DISORDER, UNSPECIFIED: ICD-10-CM

## 2021-08-31 PROCEDURE — 51797 INTRAABDOMINAL PRESSURE TEST: CPT

## 2021-08-31 PROCEDURE — 51741 ELECTRO-UROFLOWMETRY FIRST: CPT

## 2021-08-31 PROCEDURE — 51784 ANAL/URINARY MUSCLE STUDY: CPT

## 2021-08-31 PROCEDURE — 51798 US URINE CAPACITY MEASURE: CPT | Mod: 59

## 2021-08-31 PROCEDURE — 51728 CYSTOMETROGRAM W/VP: CPT

## 2021-09-04 PROBLEM — N32.9 LESION OF BLADDER: Status: ACTIVE | Noted: 2019-10-01

## 2021-09-04 LAB — URINE CYTOLOGY: NORMAL

## 2021-09-20 ENCOUNTER — INPATIENT (INPATIENT)
Facility: HOSPITAL | Age: 81
LOS: 4 days | Discharge: HOME | DRG: 291 | End: 2021-09-25
Attending: STUDENT IN AN ORGANIZED HEALTH CARE EDUCATION/TRAINING PROGRAM | Admitting: HOSPITALIST
Payer: MEDICARE

## 2021-09-20 VITALS
DIASTOLIC BLOOD PRESSURE: 57 MMHG | TEMPERATURE: 98 F | HEART RATE: 73 BPM | RESPIRATION RATE: 22 BRPM | WEIGHT: 265 LBS | SYSTOLIC BLOOD PRESSURE: 157 MMHG | OXYGEN SATURATION: 88 % | HEIGHT: 63 IN

## 2021-09-20 DIAGNOSIS — Z98.890 OTHER SPECIFIED POSTPROCEDURAL STATES: Chronic | ICD-10-CM

## 2021-09-20 DIAGNOSIS — R06.00 DYSPNEA, UNSPECIFIED: ICD-10-CM

## 2021-09-20 DIAGNOSIS — Z98.49 CATARACT EXTRACTION STATUS, UNSPECIFIED EYE: Chronic | ICD-10-CM

## 2021-09-20 DIAGNOSIS — Z90.89 ACQUIRED ABSENCE OF OTHER ORGANS: Chronic | ICD-10-CM

## 2021-09-20 DIAGNOSIS — Z41.9 ENCOUNTER FOR PROCEDURE FOR PURPOSES OTHER THAN REMEDYING HEALTH STATE, UNSPECIFIED: Chronic | ICD-10-CM

## 2021-09-20 LAB
ADD ON TEST-SPECIMEN IN LAB: SIGNIFICANT CHANGE UP
ALBUMIN SERPL ELPH-MCNC: 3.2 G/DL — LOW (ref 3.3–5)
ALP SERPL-CCNC: 70 U/L — SIGNIFICANT CHANGE UP (ref 40–120)
ALT FLD-CCNC: 22 U/L — SIGNIFICANT CHANGE UP (ref 12–78)
ANION GAP SERPL CALC-SCNC: 3 MMOL/L — LOW (ref 5–17)
APPEARANCE UR: CLEAR — SIGNIFICANT CHANGE UP
AST SERPL-CCNC: 17 U/L — SIGNIFICANT CHANGE UP (ref 15–37)
BASOPHILS # BLD AUTO: 0.06 K/UL — SIGNIFICANT CHANGE UP (ref 0–0.2)
BASOPHILS NFR BLD AUTO: 0.8 % — SIGNIFICANT CHANGE UP (ref 0–2)
BILIRUB SERPL-MCNC: 0.7 MG/DL — SIGNIFICANT CHANGE UP (ref 0.2–1.2)
BILIRUB UR-MCNC: NEGATIVE — SIGNIFICANT CHANGE UP
BUN SERPL-MCNC: 12 MG/DL — SIGNIFICANT CHANGE UP (ref 7–23)
CALCIUM SERPL-MCNC: 9.3 MG/DL — SIGNIFICANT CHANGE UP (ref 8.5–10.1)
CHLORIDE SERPL-SCNC: 107 MMOL/L — SIGNIFICANT CHANGE UP (ref 96–108)
CO2 SERPL-SCNC: 31 MMOL/L — SIGNIFICANT CHANGE UP (ref 22–31)
COLOR SPEC: YELLOW — SIGNIFICANT CHANGE UP
CREAT SERPL-MCNC: 0.6 MG/DL — SIGNIFICANT CHANGE UP (ref 0.5–1.3)
DIFF PNL FLD: ABNORMAL
EOSINOPHIL # BLD AUTO: 0.11 K/UL — SIGNIFICANT CHANGE UP (ref 0–0.5)
EOSINOPHIL NFR BLD AUTO: 1.4 % — SIGNIFICANT CHANGE UP (ref 0–6)
GLUCOSE BLDC GLUCOMTR-MCNC: 151 MG/DL — HIGH (ref 70–99)
GLUCOSE SERPL-MCNC: 153 MG/DL — HIGH (ref 70–99)
GLUCOSE UR QL: 1000 MG/DL
HCT VFR BLD CALC: 44.4 % — SIGNIFICANT CHANGE UP (ref 34.5–45)
HGB BLD-MCNC: 13.6 G/DL — SIGNIFICANT CHANGE UP (ref 11.5–15.5)
IMM GRANULOCYTES NFR BLD AUTO: 0.6 % — SIGNIFICANT CHANGE UP (ref 0–1.5)
KETONES UR-MCNC: NEGATIVE — SIGNIFICANT CHANGE UP
LACTATE SERPL-SCNC: 0.6 MMOL/L — LOW (ref 0.7–2)
LEUKOCYTE ESTERASE UR-ACNC: NEGATIVE — SIGNIFICANT CHANGE UP
LYMPHOCYTES # BLD AUTO: 0.71 K/UL — LOW (ref 1–3.3)
LYMPHOCYTES # BLD AUTO: 9.1 % — LOW (ref 13–44)
MCHC RBC-ENTMCNC: 28.3 PG — SIGNIFICANT CHANGE UP (ref 27–34)
MCHC RBC-ENTMCNC: 30.6 GM/DL — LOW (ref 32–36)
MCV RBC AUTO: 92.5 FL — SIGNIFICANT CHANGE UP (ref 80–100)
MONOCYTES # BLD AUTO: 0.42 K/UL — SIGNIFICANT CHANGE UP (ref 0–0.9)
MONOCYTES NFR BLD AUTO: 5.4 % — SIGNIFICANT CHANGE UP (ref 2–14)
NEUTROPHILS # BLD AUTO: 6.45 K/UL — SIGNIFICANT CHANGE UP (ref 1.8–7.4)
NEUTROPHILS NFR BLD AUTO: 82.7 % — HIGH (ref 43–77)
NITRITE UR-MCNC: NEGATIVE — SIGNIFICANT CHANGE UP
PH UR: 7 — SIGNIFICANT CHANGE UP (ref 5–8)
PLATELET # BLD AUTO: 178 K/UL — SIGNIFICANT CHANGE UP (ref 150–400)
POTASSIUM SERPL-MCNC: 4.2 MMOL/L — SIGNIFICANT CHANGE UP (ref 3.5–5.3)
POTASSIUM SERPL-SCNC: 4.2 MMOL/L — SIGNIFICANT CHANGE UP (ref 3.5–5.3)
PROT SERPL-MCNC: 7.5 GM/DL — SIGNIFICANT CHANGE UP (ref 6–8.3)
PROT UR-MCNC: 30 MG/DL
RAPID RVP RESULT: SIGNIFICANT CHANGE UP
RBC # BLD: 4.8 M/UL — SIGNIFICANT CHANGE UP (ref 3.8–5.2)
RBC # FLD: 15.8 % — HIGH (ref 10.3–14.5)
SARS-COV-2 RNA SPEC QL NAA+PROBE: SIGNIFICANT CHANGE UP
SODIUM SERPL-SCNC: 141 MMOL/L — SIGNIFICANT CHANGE UP (ref 135–145)
SP GR SPEC: 1.01 — SIGNIFICANT CHANGE UP (ref 1.01–1.02)
UROBILINOGEN FLD QL: NEGATIVE MG/DL — SIGNIFICANT CHANGE UP
WBC # BLD: 7.8 K/UL — SIGNIFICANT CHANGE UP (ref 3.8–10.5)
WBC # FLD AUTO: 7.8 K/UL — SIGNIFICANT CHANGE UP (ref 3.8–10.5)

## 2021-09-20 PROCEDURE — 93306 TTE W/DOPPLER COMPLETE: CPT

## 2021-09-20 PROCEDURE — 84100 ASSAY OF PHOSPHORUS: CPT

## 2021-09-20 PROCEDURE — 80048 BASIC METABOLIC PNL TOTAL CA: CPT

## 2021-09-20 PROCEDURE — 71275 CT ANGIOGRAPHY CHEST: CPT

## 2021-09-20 PROCEDURE — 36415 COLL VENOUS BLD VENIPUNCTURE: CPT

## 2021-09-20 PROCEDURE — 99497 ADVNCD CARE PLAN 30 MIN: CPT | Mod: 25

## 2021-09-20 PROCEDURE — 85027 COMPLETE CBC AUTOMATED: CPT

## 2021-09-20 PROCEDURE — 99285 EMERGENCY DEPT VISIT HI MDM: CPT | Mod: CS

## 2021-09-20 PROCEDURE — 71046 X-RAY EXAM CHEST 2 VIEWS: CPT

## 2021-09-20 PROCEDURE — 83880 ASSAY OF NATRIURETIC PEPTIDE: CPT

## 2021-09-20 PROCEDURE — 36600 WITHDRAWAL OF ARTERIAL BLOOD: CPT

## 2021-09-20 PROCEDURE — 94618 PULMONARY STRESS TESTING: CPT

## 2021-09-20 PROCEDURE — 83036 HEMOGLOBIN GLYCOSYLATED A1C: CPT

## 2021-09-20 PROCEDURE — 80053 COMPREHEN METABOLIC PANEL: CPT

## 2021-09-20 PROCEDURE — 99223 1ST HOSP IP/OBS HIGH 75: CPT

## 2021-09-20 PROCEDURE — 84443 ASSAY THYROID STIM HORMONE: CPT

## 2021-09-20 PROCEDURE — 71045 X-RAY EXAM CHEST 1 VIEW: CPT | Mod: 26

## 2021-09-20 PROCEDURE — 93970 EXTREMITY STUDY: CPT

## 2021-09-20 PROCEDURE — 86769 SARS-COV-2 COVID-19 ANTIBODY: CPT

## 2021-09-20 PROCEDURE — 85379 FIBRIN DEGRADATION QUANT: CPT

## 2021-09-20 PROCEDURE — 97163 PT EVAL HIGH COMPLEX 45 MIN: CPT | Mod: GP

## 2021-09-20 PROCEDURE — 82803 BLOOD GASES ANY COMBINATION: CPT

## 2021-09-20 PROCEDURE — 83735 ASSAY OF MAGNESIUM: CPT

## 2021-09-20 PROCEDURE — 80061 LIPID PANEL: CPT

## 2021-09-20 PROCEDURE — 82962 GLUCOSE BLOOD TEST: CPT

## 2021-09-20 RX ORDER — NATEGLINIDE 60 MG/1
1 TABLET, COATED ORAL
Qty: 0 | Refills: 0 | DISCHARGE

## 2021-09-20 RX ORDER — OXYBUTYNIN CHLORIDE 5 MG
5 TABLET ORAL
Refills: 0 | Status: DISCONTINUED | OUTPATIENT
Start: 2021-09-20 | End: 2021-09-25

## 2021-09-20 RX ORDER — ESCITALOPRAM OXALATE 10 MG/1
20 TABLET, FILM COATED ORAL DAILY
Refills: 0 | Status: DISCONTINUED | OUTPATIENT
Start: 2021-09-20 | End: 2021-09-25

## 2021-09-20 RX ORDER — METFORMIN HYDROCHLORIDE 850 MG/1
2 TABLET ORAL
Qty: 0 | Refills: 0 | DISCHARGE

## 2021-09-20 RX ORDER — SITAGLIPTIN 50 MG/1
1 TABLET, FILM COATED ORAL
Qty: 0 | Refills: 0 | DISCHARGE

## 2021-09-20 RX ORDER — ACETAMINOPHEN 500 MG
650 TABLET ORAL EVERY 6 HOURS
Refills: 0 | Status: DISCONTINUED | OUTPATIENT
Start: 2021-09-20 | End: 2021-09-25

## 2021-09-20 RX ORDER — NEBIVOLOL HYDROCHLORIDE 5 MG/1
5 TABLET ORAL DAILY
Refills: 0 | Status: DISCONTINUED | OUTPATIENT
Start: 2021-09-20 | End: 2021-09-25

## 2021-09-20 RX ORDER — LEVOTHYROXINE SODIUM 125 MCG
150 TABLET ORAL DAILY
Refills: 0 | Status: DISCONTINUED | OUTPATIENT
Start: 2021-09-20 | End: 2021-09-25

## 2021-09-20 RX ORDER — ASPIRIN/CALCIUM CARB/MAGNESIUM 324 MG
1 TABLET ORAL
Qty: 0 | Refills: 0 | DISCHARGE

## 2021-09-20 RX ORDER — ROSUVASTATIN CALCIUM 5 MG/1
1 TABLET ORAL
Qty: 0 | Refills: 0 | DISCHARGE

## 2021-09-20 RX ORDER — AMLODIPINE BESYLATE 2.5 MG/1
5 TABLET ORAL DAILY
Refills: 0 | Status: DISCONTINUED | OUTPATIENT
Start: 2021-09-20 | End: 2021-09-22

## 2021-09-20 RX ORDER — FUROSEMIDE 40 MG
40 TABLET ORAL DAILY
Refills: 0 | Status: DISCONTINUED | OUTPATIENT
Start: 2021-09-21 | End: 2021-09-22

## 2021-09-20 RX ORDER — LISINOPRIL 2.5 MG/1
40 TABLET ORAL DAILY
Refills: 0 | Status: DISCONTINUED | OUTPATIENT
Start: 2021-09-20 | End: 2021-09-25

## 2021-09-20 RX ORDER — FUROSEMIDE 40 MG
20 TABLET ORAL ONCE
Refills: 0 | Status: COMPLETED | OUTPATIENT
Start: 2021-09-20 | End: 2021-09-20

## 2021-09-20 RX ORDER — ENOXAPARIN SODIUM 100 MG/ML
40 INJECTION SUBCUTANEOUS EVERY 12 HOURS
Refills: 0 | Status: DISCONTINUED | OUTPATIENT
Start: 2021-09-20 | End: 2021-09-25

## 2021-09-20 RX ORDER — LEVOTHYROXINE SODIUM 125 MCG
0 TABLET ORAL
Qty: 0 | Refills: 0 | DISCHARGE

## 2021-09-20 RX ORDER — FESOTERODINE FUMARATE 8 MG/1
1 TABLET, FILM COATED, EXTENDED RELEASE ORAL
Qty: 0 | Refills: 0 | DISCHARGE

## 2021-09-20 RX ORDER — ATORVASTATIN CALCIUM 80 MG/1
40 TABLET, FILM COATED ORAL AT BEDTIME
Refills: 0 | Status: DISCONTINUED | OUTPATIENT
Start: 2021-09-20 | End: 2021-09-25

## 2021-09-20 RX ORDER — ASPIRIN/CALCIUM CARB/MAGNESIUM 324 MG
325 TABLET ORAL DAILY
Refills: 0 | Status: DISCONTINUED | OUTPATIENT
Start: 2021-09-20 | End: 2021-09-25

## 2021-09-20 RX ORDER — INFLUENZA VIRUS VACCINE 15; 15; 15; 15 UG/.5ML; UG/.5ML; UG/.5ML; UG/.5ML
0.5 SUSPENSION INTRAMUSCULAR ONCE
Refills: 0 | Status: DISCONTINUED | OUTPATIENT
Start: 2021-09-20 | End: 2021-09-25

## 2021-09-20 RX ORDER — FUROSEMIDE 40 MG
1 TABLET ORAL
Qty: 0 | Refills: 0 | DISCHARGE

## 2021-09-20 RX ORDER — ESCITALOPRAM OXALATE 10 MG/1
1 TABLET, FILM COATED ORAL
Qty: 0 | Refills: 0 | DISCHARGE

## 2021-09-20 RX ORDER — LEVOTHYROXINE SODIUM 125 MCG
1 TABLET ORAL
Qty: 0 | Refills: 0 | DISCHARGE

## 2021-09-20 RX ORDER — OXYBUTYNIN CHLORIDE 5 MG
1 TABLET ORAL
Qty: 0 | Refills: 0 | DISCHARGE

## 2021-09-20 RX ADMIN — Medication 5 MILLIGRAM(S): at 22:17

## 2021-09-20 RX ADMIN — ATORVASTATIN CALCIUM 40 MILLIGRAM(S): 80 TABLET, FILM COATED ORAL at 22:19

## 2021-09-20 RX ADMIN — Medication 325 MILLIGRAM(S): at 22:18

## 2021-09-20 RX ADMIN — Medication 20 MILLIGRAM(S): at 12:48

## 2021-09-20 RX ADMIN — ENOXAPARIN SODIUM 40 MILLIGRAM(S): 100 INJECTION SUBCUTANEOUS at 22:18

## 2021-09-20 NOTE — H&P ADULT - CONVERSATION DETAILS
Pt would like to remain Full Code, with trial of intubation/BIPAP, also feeding tube trial ok.  Her  Anthony is her HCP.

## 2021-09-20 NOTE — ED PROVIDER NOTE - CHIEF COMPLAINT
Problem: Infant Inpatient Plan of Care  Goal: Plan of Care Review  Outcome: Ongoing (interventions implemented as appropriate)  Mother sleeping at bedside, appropriate cares. Patient remains in isolette manual mode, able to wean appropriately, maintaining temps. Patient remains on 2L CF, FIO2 21-25%. One apnea/bradycardic event, while mother holding, tactile stimulation provided.  Patient remains q3 gavage feeds. Two feeds of sim adv 24 lamonte high protein, and two of debm 24 lamonte unfortified. Tolerating with no spits. Tail of shunt on R head, not as easily moveable this shift with pale yellow drainage around one stitch proximal to face. Voiding and stooled. Will monitor closely.        The patient is a 80y Female complaining of shortness of breath.

## 2021-09-20 NOTE — H&P ADULT - ASSESSMENT
Pt is admitted w/  SOB  CHF exac  Obesity  DM   - admit to telemetry  - s/p lasix 40mg , had diuresis > 750 ml urine in the ED  - echo (pt reports she has not had an echo in 5 yrs)  - cardiology consult : family requests Dr. Feldman.  (note: they do not want to see Dr. De Leon)  - pt counseled on avoiding high salt foods: Progresso canned soup ,  cheeses, etc  - Nutrition consult  - hold oral meds and will give Lantus and Insulin Sliding scale  - pt advised to have an outpt sleep study to evaluate for Sleep Apnea  - Dvt propylaxis: lovenox  - Adv Dir: Full Code.  MOLST completed with pt.   Anthony is her HCP.

## 2021-09-20 NOTE — H&P ADULT - HISTORY OF PRESENT ILLNESS
Pt is a pleasant 79 y/o female w/ PMHx of Anxiety, History of osteoarthritis knees, Hypertension, Hypothyroid, Morbid obesity, Panic attacks, Diabetes mellitus II, Urine incontinence on a diuretic every three days presents to [facility]  ED with increasing shortness of breath.   Pt reports worsening  dyspnea with activity for the last week.   She also has  a dry cough, orthopnea,  chronic leg swelling.  Pt. state that symptoms are worse at night.     Pt denies chest pain, no fever, chills, no abd pain, no nausea, vomiting.  Pt is a pleasant 81 y/o female w/ PMHx of Anxiety, knee osteoarthritis, Hypertension, Hypothyroidism, Morbid obesity, Panic attacks, Diabetes mellitus II, Urine incontinence on  lasix  every three days who presents to [facility]  ED with increasing shortness of breath.   Pt reports worsening  dyspnea with walking from room to room for the last week.   She also has  a dry cough, orthopnea,  chronic leg swelling.  Pt. stated that symptoms are worse at night.    She denies increase in sodium intake but admits to having cheese frequently and Progresso canned soup.      She had reported a post-nasal drip at night with a cough and was treated with an Antibiotic last month.  She said she felt better , however around Sept 10th she started to have similar symptoms and also tried Mucinex. She c/o feeling tired despite all the recent treatments and reports having a dripping urinary incontinence for which DR. Stevens started her on Toviaz and Myrbetriq     Pt denies chest pain, no palpitations,  no fever, chills, no abd pain, no nausea, vomiting.

## 2021-09-20 NOTE — H&P ADULT - NSHPPHYSICALEXAM_GEN_ALL_CORE
ICU Vital Signs Last 24 Hrs  T(C): 36.9 (20 Sep 2021 11:22), Max: 36.9 (20 Sep 2021 11:22)  T(F): 98.4 (20 Sep 2021 11:22), Max: 98.4 (20 Sep 2021 11:22)  HR: 60 (20 Sep 2021 12:48) (60 - 73)  BP: 144/57 (20 Sep 2021 12:48) (144/57 - 157/57)  BP(mean): 79 (20 Sep 2021 12:48) (79 - 82)    RR: 20 (20 Sep 2021 12:48) (20 - 22)   SpO2: 96% (20 Sep 2021 12:48) (88% - 96%)

## 2021-09-20 NOTE — ED PROVIDER NOTE - CARDIAC, MLM
Normal rate, regular rhythm.  Heart sounds S1, S2.  No murmurs, rubs or gallops. 1+ bilateral lower extremity edema

## 2021-09-20 NOTE — ED PROVIDER NOTE - OBJECTIVE STATEMENT
81 y/o female w/ PMHx of __ presents to the ED c.o. 79 y/o female w/ PMHx of Anxiety, History of osteoarthritis knees, Hypertension, Hypothyroid, Morbid obesity, Panic attacks, Type 2 diabetes mellitus, Urine incontinence presents to the ED c.o. progressive dyspnea on exertion for 1 week associated with orthopnea and dry cough. Pt. state that symptoms are worse at night. Denies fever, chills, nausea, vomiting. States she has a hx. of leg swelling and is on a water pill but is unsure of which one. States she takes it every three days.

## 2021-09-20 NOTE — ED PROVIDER NOTE - CLINICAL SUMMARY MEDICAL DECISION MAKING FREE TEXT BOX
CHF exacerbation; X ray labs, diuresis, likely admit. CHF exacerbation?; X ray labs, diuresis, likely admit.

## 2021-09-20 NOTE — PATIENT PROFILE ADULT - VISION (WITH CORRECTIVE LENSES IF THE PATIENT USUALLY WEARS THEM):
Partially impaired: cannot see medication labels or newsprint, but can see obstacles in path, and the surrounding layout; can count fingers at arm's length Quinolones Counseling:  I discussed with the patient the risks of fluoroquinolones including but not limited to GI upset, allergic reaction, drug rash, diarrhea, dizziness, photosensitivity, yeast infections, liver function test abnormalities, tendonitis/tendon rupture.

## 2021-09-21 DIAGNOSIS — E66.9 OBESITY, UNSPECIFIED: ICD-10-CM

## 2021-09-21 DIAGNOSIS — E66.01 MORBID (SEVERE) OBESITY DUE TO EXCESS CALORIES: ICD-10-CM

## 2021-09-21 DIAGNOSIS — E11.9 TYPE 2 DIABETES MELLITUS WITHOUT COMPLICATIONS: ICD-10-CM

## 2021-09-21 DIAGNOSIS — R06.00 DYSPNEA, UNSPECIFIED: ICD-10-CM

## 2021-09-21 DIAGNOSIS — I10 ESSENTIAL (PRIMARY) HYPERTENSION: ICD-10-CM

## 2021-09-21 LAB
A1C WITH ESTIMATED AVERAGE GLUCOSE RESULT: 7.4 % — HIGH (ref 4–5.6)
CHOLEST SERPL-MCNC: 155 MG/DL — SIGNIFICANT CHANGE UP
COVID-19 SPIKE DOMAIN AB INTERP: POSITIVE
COVID-19 SPIKE DOMAIN ANTIBODY RESULT: 57.2 U/ML — HIGH
D DIMER BLD IA.RAPID-MCNC: 392 NG/ML DDU — HIGH
ESTIMATED AVERAGE GLUCOSE: 166 MG/DL — HIGH (ref 68–114)
GLUCOSE BLDC GLUCOMTR-MCNC: 123 MG/DL — HIGH (ref 70–99)
HDLC SERPL-MCNC: 44 MG/DL — LOW
LIPID PNL WITH DIRECT LDL SERPL: 88 MG/DL — SIGNIFICANT CHANGE UP
NON HDL CHOLESTEROL: 111 MG/DL — SIGNIFICANT CHANGE UP
SARS-COV-2 IGG+IGM SERPL QL IA: 57.2 U/ML — HIGH
SARS-COV-2 IGG+IGM SERPL QL IA: POSITIVE
TRIGL SERPL-MCNC: 118 MG/DL — SIGNIFICANT CHANGE UP

## 2021-09-21 PROCEDURE — 93970 EXTREMITY STUDY: CPT | Mod: 26

## 2021-09-21 PROCEDURE — 99233 SBSQ HOSP IP/OBS HIGH 50: CPT

## 2021-09-21 PROCEDURE — 99223 1ST HOSP IP/OBS HIGH 75: CPT

## 2021-09-21 PROCEDURE — 93306 TTE W/DOPPLER COMPLETE: CPT | Mod: 26

## 2021-09-21 PROCEDURE — 71275 CT ANGIOGRAPHY CHEST: CPT | Mod: 26

## 2021-09-21 RX ORDER — DEXTROSE 50 % IN WATER 50 %
12.5 SYRINGE (ML) INTRAVENOUS ONCE
Refills: 0 | Status: DISCONTINUED | OUTPATIENT
Start: 2021-09-21 | End: 2021-09-25

## 2021-09-21 RX ORDER — SODIUM CHLORIDE 9 MG/ML
1000 INJECTION, SOLUTION INTRAVENOUS
Refills: 0 | Status: DISCONTINUED | OUTPATIENT
Start: 2021-09-21 | End: 2021-09-25

## 2021-09-21 RX ORDER — DEXTROSE 50 % IN WATER 50 %
25 SYRINGE (ML) INTRAVENOUS ONCE
Refills: 0 | Status: DISCONTINUED | OUTPATIENT
Start: 2021-09-21 | End: 2021-09-25

## 2021-09-21 RX ORDER — INSULIN LISPRO 100/ML
VIAL (ML) SUBCUTANEOUS
Refills: 0 | Status: DISCONTINUED | OUTPATIENT
Start: 2021-09-21 | End: 2021-09-25

## 2021-09-21 RX ORDER — GLUCAGON INJECTION, SOLUTION 0.5 MG/.1ML
1 INJECTION, SOLUTION SUBCUTANEOUS ONCE
Refills: 0 | Status: DISCONTINUED | OUTPATIENT
Start: 2021-09-21 | End: 2021-09-25

## 2021-09-21 RX ORDER — INSULIN LISPRO 100/ML
VIAL (ML) SUBCUTANEOUS AT BEDTIME
Refills: 0 | Status: DISCONTINUED | OUTPATIENT
Start: 2021-09-21 | End: 2021-09-25

## 2021-09-21 RX ORDER — INSULIN GLARGINE 100 [IU]/ML
5 INJECTION, SOLUTION SUBCUTANEOUS EVERY MORNING
Refills: 0 | Status: DISCONTINUED | OUTPATIENT
Start: 2021-09-21 | End: 2021-09-25

## 2021-09-21 RX ORDER — INSULIN GLARGINE 100 [IU]/ML
5 INJECTION, SOLUTION SUBCUTANEOUS AT BEDTIME
Refills: 0 | Status: DISCONTINUED | OUTPATIENT
Start: 2021-09-21 | End: 2021-09-25

## 2021-09-21 RX ORDER — DEXTROSE 50 % IN WATER 50 %
15 SYRINGE (ML) INTRAVENOUS ONCE
Refills: 0 | Status: DISCONTINUED | OUTPATIENT
Start: 2021-09-21 | End: 2021-09-25

## 2021-09-21 RX ADMIN — AMLODIPINE BESYLATE 5 MILLIGRAM(S): 2.5 TABLET ORAL at 09:43

## 2021-09-21 RX ADMIN — INSULIN GLARGINE 5 UNIT(S): 100 INJECTION, SOLUTION SUBCUTANEOUS at 08:50

## 2021-09-21 RX ADMIN — Medication 5 MILLIGRAM(S): at 21:16

## 2021-09-21 RX ADMIN — ENOXAPARIN SODIUM 40 MILLIGRAM(S): 100 INJECTION SUBCUTANEOUS at 21:16

## 2021-09-21 RX ADMIN — Medication 40 MILLIGRAM(S): at 09:43

## 2021-09-21 RX ADMIN — Medication 325 MILLIGRAM(S): at 21:16

## 2021-09-21 RX ADMIN — ATORVASTATIN CALCIUM 40 MILLIGRAM(S): 80 TABLET, FILM COATED ORAL at 21:16

## 2021-09-21 RX ADMIN — INSULIN GLARGINE 5 UNIT(S): 100 INJECTION, SOLUTION SUBCUTANEOUS at 21:16

## 2021-09-21 RX ADMIN — NEBIVOLOL HYDROCHLORIDE 5 MILLIGRAM(S): 5 TABLET ORAL at 09:44

## 2021-09-21 RX ADMIN — Medication 5 MILLIGRAM(S): at 09:44

## 2021-09-21 RX ADMIN — LISINOPRIL 40 MILLIGRAM(S): 2.5 TABLET ORAL at 09:44

## 2021-09-21 RX ADMIN — ESCITALOPRAM OXALATE 20 MILLIGRAM(S): 10 TABLET, FILM COATED ORAL at 09:43

## 2021-09-21 RX ADMIN — ENOXAPARIN SODIUM 40 MILLIGRAM(S): 100 INJECTION SUBCUTANEOUS at 09:43

## 2021-09-21 RX ADMIN — Medication 150 MICROGRAM(S): at 05:52

## 2021-09-21 NOTE — DIETITIAN INITIAL EVALUATION ADULT. - PERTINENT LABORATORY DATA
09-21    140  |  106  |  14  ----------------------------<  139<H>  3.7   |  32<H>  |  0.53    Ca    9.0      21 Sep 2021 07:18    TPro  7.5  /  Alb  3.2<L>  /  TBili  0.7  /  DBili  x   /  AST  17  /  ALT  22  /  AlkPhos  70  09-20

## 2021-09-21 NOTE — DIETITIAN INITIAL EVALUATION ADULT. - OTHER INFO
Pt is a pleasant 79 y/o female w/ PMHx of Anxiety, knee osteoarthritis, Hypertension, Hypothyroidism, Morbid obesity, Panic attacks, Diabetes mellitus II, Urine incontinence on  lasix  every three days who presents to [facility]  ED with increasing shortness of breath. Pt reports worsening  dyspnea with walking from room to room for the last week.   She also has  a dry cough, orthopnea,  chronic leg swelling.  Pt. stated that symptoms are worse at night.    She denies increase in sodium intake but admits to having cheese frequently and Progresso canned soup. She had reported a post-nasal drip at night with a cough and was treated with an Antibiotic last month.  She said she felt better , however around Sept 10th she started to have similar symptoms and also tried Mucinex. She c/o feeling tired despite all the recent treatments and reports having a dripping urinary incontinence for which DR. Stevens started her on Toviaz and Myrbetriq.    Pt seen for assessment and education for heart failure. Pt reports eating small meals and states she doesn't eat a lot. Reviewed 24 hour recall and pt's intake appears light, but some high sodium items are being used. Pt denies c/s difficulty and pt denies n/v/d/c. Pt denies food allergies. Pt dose not recall any weight changes and states UBW is around 260lbs (current weight on admission 260lbs). Discussed with pt about low sodium diet, fluid intake and daily weights. Pt receptive to information, provided alternatives to progresso soups and high sodium items.

## 2021-09-21 NOTE — CONSULT NOTE ADULT - PROBLEM SELECTOR RECOMMENDATION 9
with above hx morbid obesity with multiple predictors for CAD Dyspnea possible acute on chronic diastolic heart failure underlying hypertensive heart disease , however patient has normal Pro BNP ,negative troponin no ischemic EKG changes  can not rule out ischemia heart disease and   possible allergic bronchitis can not be ruled out    would obtain echo to assess ventricular function ,  cardiac cath  if LV shows any dysfunction , consider change IV lasix to PO monitor renal function with above hx morbid obesity with multiple predictors for CAD Dyspnea possible acute on chronic diastolic heart failure underlying hypertensive heart disease , however patient has normal Pro BNP ,negative troponin no ischemic EKG changes  can not rule out ischemia heart disease and   possible allergic bronchitis can not be ruled out  CT angio ordered to rule out PE     would obtain echo to assess ventricular function ,  cardiac cath  if LV shows any dysfunction , consider change IV lasix to PO monitor renal function

## 2021-09-21 NOTE — PROGRESS NOTE ADULT - ASSESSMENT
Pt is admitted w/  SOB  CHF exac  Obesity  DM   - admit to telemetry  - s/p lasix 40mg , had diuresis > 750 ml urine in the ED  - echo (pt reports she has not had an echo in 5 yrs)  - cardiology consult : family requests Dr. Feldman.  (note: they do not want to see Dr. De Leon)  - pt counseled on avoiding high salt foods: Progresso canned soup ,  cheeses, etc  - Nutrition consult  - hold oral meds and will give Lantus and Insulin Sliding scale  - pt advised to have an outpt sleep study to evaluate for Sleep Apnea  - Dvt propylaxis: lovenox  - Adv Dir: Full Code.  MOLST completed with pt.   Anthony is her HCP.  81 y/o female w/ PMHx of Anxiety, knee osteoarthritis, Hypertension, Hypothyroidism, Morbid obesity, Panic attacks, Diabetes mellitus II, Urine incontinence on  lasix  admitted for:       1. Acute Hypoxic  Respiratory failure 2/2 Acute CHF exacerbation, unknown EF   Clinically likely CHF but BNP is WNL ( possibly false neg in obesity )   LE doppler for acute on chronic edema checked and neg for DVT   Trops neg   CTA to r/o PE neg, + intersitial edema and small  C/w IV lasix 40mg QD  Monitor Is and Os,  check Weight daily   ECHO pending   Cardio eval       2. DM Type II   hold PO meds  Monitor BS, cover with ISS   Diabetic diet         3. HTN   Monitor BP  C/w Nebivilol, Lisinopril, amlodipine for now      4. Hypothyroidism  C/w Synthroid  Check TSK      5. Urinary incontinence  C/w Oxybutynin      6. HLD  C/w Lipitor       7. Anxiety  C/w lexapro     DVT PPX:  Lovenox                             Pt is admitted w/  SOB  CHF exac  Obesity  DM   - admit to telemetry  - s/p lasix 40mg , had diuresis > 750 ml urine in the ED  - echo (pt reports she has not had an echo in 5 yrs)  - cardiology consult : family requests Dr. Feldman.  (note: they do not want to see Dr. De Leon)  - pt counseled on avoiding high salt foods: Progresso canned soup ,  cheeses, etc  - Nutrition consult  - hold oral meds and will give Lantus and Insulin Sliding scale  - pt advised to have an outpt sleep study to evaluate for Sleep Apnea  - Dvt propylaxis: lovenox  - Adv Dir: Full Code.  MOLST completed with pt.   Anthony is her HCP.

## 2021-09-21 NOTE — DIETITIAN INITIAL EVALUATION ADULT. - PERTINENT MEDS FT
MEDICATIONS  (STANDING):  amLODIPine   Tablet 5 milliGRAM(s) Oral daily  aspirin 325 milliGRAM(s) Oral daily  atorvastatin 40 milliGRAM(s) Oral at bedtime  dextrose 40% Gel 15 Gram(s) Oral once  dextrose 5%. 1000 milliLiter(s) (50 mL/Hr) IV Continuous <Continuous>  dextrose 5%. 1000 milliLiter(s) (100 mL/Hr) IV Continuous <Continuous>  dextrose 50% Injectable 25 Gram(s) IV Push once  dextrose 50% Injectable 12.5 Gram(s) IV Push once  dextrose 50% Injectable 25 Gram(s) IV Push once  enoxaparin Injectable 40 milliGRAM(s) SubCutaneous every 12 hours  escitalopram 20 milliGRAM(s) Oral daily  furosemide   Injectable 40 milliGRAM(s) IV Push daily  glucagon  Injectable 1 milliGRAM(s) IntraMuscular once  influenza   Vaccine 0.5 milliLiter(s) IntraMuscular once  insulin glargine Injectable (LANTUS) 5 Unit(s) SubCutaneous every morning  insulin glargine Injectable (LANTUS) 5 Unit(s) SubCutaneous at bedtime  insulin lispro (ADMELOG) corrective regimen sliding scale   SubCutaneous three times a day before meals  insulin lispro (ADMELOG) corrective regimen sliding scale   SubCutaneous at bedtime  levothyroxine 150 MICROGram(s) Oral daily  lisinopril 40 milliGRAM(s) Oral daily  nebivolol 5 milliGRAM(s) Oral daily  oxybutynin 5 milliGRAM(s) Oral two times a day    MEDICATIONS  (PRN):  acetaminophen   Tablet .. 650 milliGRAM(s) Oral every 6 hours PRN Mild Pain (1 - 3)

## 2021-09-21 NOTE — CONSULT NOTE ADULT - SUBJECTIVE AND OBJECTIVE BOX
CHIEF COMPLAINT: shortness of breath cough for one week     HPI:  Pt is a pleasant 81 y/o female w/ PMHx of Anxiety, knee osteoarthritis, Hypertension, Hypothyroidism, Morbid obesity, Panic attacks, Diabetes mellitus II, Urine incontinence on  lasix  every three days who presents to [facility] with increasing shortness of breath   Pt reports worsening  dyspnea with walking from room to room for the last week  with cough which is dry gives hx of post nasal drip , seasonal allergies .   She also has  a dry cough, orthopnea,  chronic leg swelling.  Pt states  that symptoms are worse at night.  Patient denies any associated with chest pain or tightness ,  but sob gets worse with exertion ,some times wheeze    She denies increase in sodium intake but admits to having cheese frequently and Progresso canned soup.      She had reported a post-nasal drip at night with a cough and was treated with an Antibiotic last month.  She said she felt better , however around Sept 10th she started to have similar symptoms and also tried Mucinex. She c/o feeling tired despite all the recent treatments and reports having a dripping urinary incontinence for which DR. Stevens started her on Toviaz and Myrbetriq     Pt denies chest pain, no palpitations,  no fever, chills, no abd pain, no nausea, vomiting Patient blood work showed normal PRO BNP LEVEL  negative troponi       PAST MEDICAL & SURGICAL HISTORY:  Anxiety    Type 2 diabetes mellitus    Hypothyroid    Hypertension    History of osteoarthritis  knees    Morbid obesity    Panic attacks    Ankle swelling  b/l    Urine incontinence    Status post dilation and curettage    Elective surgery  insertion of pessary later removed    S/P T&amp;A (status post tonsillectomy and adenoidectomy)    S/P cataract surgery  b/l    S/P ORIF (open reduction internal fixation) fracture  left ankle    H/O colonoscopy    History of esophagogastroduodenoscopy (EGD)        Allergies    Allergy Status Unknown    Intolerances    epinephrine (Other)      SOCIAL HISTORY: former smoker remote     FAMILY HISTORY:  FHx: obesity  Pt is adopted . No other history        MEDICATIONS:  MEDICATIONS  (STANDING):  amLODIPine   Tablet 5 milliGRAM(s) Oral daily  aspirin 325 milliGRAM(s) Oral daily  atorvastatin 40 milliGRAM(s) Oral at bedtime  dextrose 40% Gel 15 Gram(s) Oral once  dextrose 5%. 1000 milliLiter(s) (50 mL/Hr) IV Continuous <Continuous>  dextrose 5%. 1000 milliLiter(s) (100 mL/Hr) IV Continuous <Continuous>  dextrose 50% Injectable 25 Gram(s) IV Push once  dextrose 50% Injectable 12.5 Gram(s) IV Push once  dextrose 50% Injectable 25 Gram(s) IV Push once  enoxaparin Injectable 40 milliGRAM(s) SubCutaneous every 12 hours  escitalopram 20 milliGRAM(s) Oral daily  furosemide   Injectable 40 milliGRAM(s) IV Push daily  glucagon  Injectable 1 milliGRAM(s) IntraMuscular once  influenza   Vaccine 0.5 milliLiter(s) IntraMuscular once  insulin glargine Injectable (LANTUS) 5 Unit(s) SubCutaneous every morning  insulin glargine Injectable (LANTUS) 5 Unit(s) SubCutaneous at bedtime  insulin lispro (ADMELOG) corrective regimen sliding scale   SubCutaneous three times a day before meals  insulin lispro (ADMELOG) corrective regimen sliding scale   SubCutaneous at bedtime  levothyroxine 150 MICROGram(s) Oral daily  lisinopril 40 milliGRAM(s) Oral daily  nebivolol 5 milliGRAM(s) Oral daily  oxybutynin 5 milliGRAM(s) Oral two times a day    MEDICATIONS  (PRN):  acetaminophen   Tablet .. 650 milliGRAM(s) Oral every 6 hours PRN Mild Pain (1 - 3)      REVIEW OF SYSTEMS:    CONSTITUTIONAL: No weakness, fevers or chills  EYES/ENT: No visual changes;  No vertigo or throat pain   NECK: No pain or stiffness  RESPIRATORY: as above   CARDIOVASCULAR: No chest pain or palpitations  GASTROINTESTINAL: No abdominal or epigastric pain. No nausea, vomiting, or hematemesis; No diarrhea or constipation. No melena or hematochezia.  GENITOURINARY: No dysuria, frequency or hematuria  NEUROLOGICAL: No numbness or weakness  SKIN: No itching, burning, rashes, or lesions   All other review of systems is negative unless indicated above    Vital Signs Last 24 Hrs  T(C): 36.3 (21 Sep 2021 08:15), Max: 37 (20 Sep 2021 18:00)  T(F): 97.4 (21 Sep 2021 08:15), Max: 98.6 (20 Sep 2021 18:00)  HR: 55 (21 Sep 2021 08:15) (54 - 73)  BP: 127/70 (21 Sep 2021 08:15) (127/70 - 162/56)  BP(mean): 86 (20 Sep 2021 20:29) (79 - 91)  RR: 16 (21 Sep 2021 08:15) (14 - 22)  SpO2: 94% (21 Sep 2021 08:15) (88% - 99%)    I&O's Summary    20 Sep 2021 07:01  -  21 Sep 2021 07:00  --------------------------------------------------------  IN: 0 mL / OUT: 400 mL / NET: -400 mL        PHYSICAL EXAM:    Constitutional: NAD, awake and alert, morbid obese   HEENT: PERR, EOMI,  No oral cyananosis.  Neck:  supple,  No JVD  Respiratory: Breath sounds are clear bilaterally, No wheezing, rales or rhonchi  Cardiovascular: S1 and S2, regular rate and rhythm, no Murmurs, gallops or rubs  Gastrointestinal: Bowel Sounds present, soft, nontender.   Extremities: No peripheral edema. left ankle chronically minimal swollen due to ankle surgery , No clubbing or cyanosis.  Vascular: 2+ peripheral pulses    Neurological: A/O x 3, no focal deficits  Musculoskeletal: no calf tenderness.  Skin: No rashes.      LABS: All Labs Reviewed:                        13.6   7.80  )-----------( 178      ( 20 Sep 2021 12:04 )             44.4     21 Sep 2021 07:18    140    |  106    |  14     ----------------------------<  139    3.7     |  32     |  0.53   20 Sep 2021 12:04    141    |  107    |  12     ----------------------------<  153    4.2     |  31     |  0.60     Ca    9.0        21 Sep 2021 07:18  Ca    9.3        20 Sep 2021 12:04    TPro  7.5    /  Alb  3.2    /  TBili  0.7    /  DBili  x      /  AST  17     /  ALT  22     /  AlkPhos  70     20 Sep 2021 12:04    PT/INR - ( 20 Sep 2021 12:48 )   PT: 12.1 sec;   INR: 1.04 ratio         PTT - ( 20 Sep 2021 12:48 )  PTT:34.2 sec  CARDIAC MARKERS ( 20 Sep 2021 12:04 )  <0.015 ng/mL / x     / x     / x     / x          Blood Culture:   09-21 @ 07:18  Pro Bnp 165  09-20 @ 12:04  Pro Bnp 241        RADIOLOGY/EKG:< from: 12 Lead ECG (09.20.21 @ 11:28) >    Diagnosis Line Normal sinus rhythm  Left axis deviation  Septal infarct (cited on or before 14-MAR-2013)  Abnormal ECG  When compared with ECG of 02-JUL-2020 11:03,  Incomplete right bundle branch block is no longer Present  Confirmed by MD LAY, LUISA ([id number]) on 9/20/2021 2:18:14 PM     < end of copied text >    < from: Xray Chest 2 Views PA/Lat (07.02.20 @ 10:57) >  IMPRESSION:    No acute radiographic cardiopulmonary pathology.    < end of copied text >

## 2021-09-21 NOTE — PROGRESS NOTE ADULT - SUBJECTIVE AND OBJECTIVE BOX
CC: SOB  CHF exac (20 Sep 2021 16:51)    HPI:  Pt is a pleasant 79 y/o female w/ PMHx of Anxiety, knee osteoarthritis, Hypertension, Hypothyroidism, Morbid obesity, Panic attacks, Diabetes mellitus II, Urine incontinence on  lasix  every three days who presents to [facility]  [facility] with increasing shortness of breath.   Pt reports worsening  dyspnea with walking from room to room for the last week.   She also has  a dry cough, orthopnea,  chronic leg swelling.  Pt. stated that symptoms are worse at night.    She denies increase in sodium intake but admits to having cheese frequently and Progresso canned soup.      She had reported a post-nasal drip at night with a cough and was treated with an Antibiotic last month.  She said she felt better , however around  she started to have similar symptoms and also tried Mucinex. She c/o feeling tired despite all the recent treatments and reports having a dripping urinary incontinence for which DR. Stevens started her on Toviaz and Myrbetriq     Pt denies chest pain, no palpitations,  no fever, chills, no abd pain, no nausea, vomiting. (20 Sep 2021 16:51)    INTERVAL HPI/OVERNIGHT EVENTS:    Vital Signs Last 24 Hrs  T(C): 36.3 (21 Sep 2021 08:15), Max: 37 (20 Sep 2021 18:00)  T(F): 97.4 (21 Sep 2021 08:15), Max: 98.6 (20 Sep 2021 18:00)  HR: 55 (21 Sep 2021 08:15) (54 - 73)  BP: 127/70 (21 Sep 2021 08:15) (127/70 - 162/56)  BP(mean): 86 (20 Sep 2021 20:29) (79 - 91)  RR: 16 (21 Sep 2021 08:15) (14 - 22)  SpO2: 94% (21 Sep 2021 08:15) (88% - 99%)  I&O's Detail    20 Sep 2021 07:01  -  21 Sep 2021 07:00  --------------------------------------------------------  IN:  Total IN: 0 mL    OUT:    Voided (mL): 400 mL  Total OUT: 400 mL    Total NET: -400 mL        REVIEW OF SYSTEMS:    CONSTITUTIONAL: No weakness, fevers or chills  EYES/ENT: No visual changes;  No vertigo or throat pain   NECK: No pain or stiffness  RESPIRATORY: No cough, wheezing, hemoptysis; No shortness of breath  CARDIOVASCULAR: No chest pain or palpitations  GASTROINTESTINAL: No abdominal or epigastric pain. No nausea, vomiting, or hematemesis; No diarrhea or constipation. No melena or hematochezia.  GENITOURINARY: No dysuria, frequency or hematuria  NEUROLOGICAL: No numbness or weakness  SKIN: No itching, burning, rashes, or lesions   All other review of systems is negative unless indicated above.  PHYSICAL EXAM:    General: Well developed; well nourished; in no acute distress  Eyes: PERRLA, EOMI; conjunctiva and sclera clear  Head: Normocephalic; atraumatic  ENMT: No nasal discharge; airway clear  Neck: Supple; non tender; no masses  Respiratory: No wheezes, rales or rhonchi  Cardiovascular: Regular rate and rhythm. S1 and S2 Normal; No murmurs, gallops or rubs  Gastrointestinal: Soft non-tender non-distended; Normal bowel sounds  Genitourinary: No  suprapubic  tenderness  Extremities: Normal range of motion, No clubbing, cyanosis or edema  Vascular: Peripheral pulses palpable 2+ bilaterally  Neurological: Alert and oriented x4  Skin: Warm and dry. No acute rash  Lymph Nodes: No acute cervical adenopathy  Musculoskeletal: Normal muscle tone, without deformities  Psychiatric: Cooperative and appropriate  CARDIAC MARKERS ( 20 Sep 2021 12:04 )  <0.015 ng/mL / x     / x     / x     / x                                13.6   7.80  )-----------( 178      ( 20 Sep 2021 12:04 )             44.4     21 Sep 2021 07:18    140    |  106    |  14     ----------------------------<  139    3.7     |  32     |  0.53     Ca    9.0        21 Sep 2021 07:18    TPro  7.5    /  Alb  3.2    /  TBili  0.7    /  DBili  x      /  AST  17     /  ALT  22     /  AlkPhos  70     20 Sep 2021 12:04    PT/INR - ( 20 Sep 2021 12:48 )   PT: 12.1 sec;   INR: 1.04 ratio         PTT - ( 20 Sep 2021 12:48 )  PTT:34.2 sec  CAPILLARY BLOOD GLUCOSE      POCT Blood Glucose.: 123 mg/dL (21 Sep 2021 08:14)  POCT Blood Glucose.: 151 mg/dL (20 Sep 2021 21:18)    LIVER FUNCTIONS - ( 20 Sep 2021 12:04 )  Alb: 3.2 g/dL / Pro: 7.5 gm/dL / ALK PHOS: 70 U/L / ALT: 22 U/L / AST: 17 U/L / GGT: x           Urinalysis Basic - ( 20 Sep 2021 12:06 )    Color: Yellow / Appearance: Clear / S.010 / pH: x  Gluc: x / Ketone: Negative  / Bili: Negative / Urobili: Negative mg/dL   Blood: x / Protein: 30 mg/dL / Nitrite: Negative   Leuk Esterase: Negative / RBC: 3-5 /HPF / WBC 0-2   Sq Epi: x / Non Sq Epi: Negative / Bacteria: Occasional        MEDICATIONS  (STANDING):  amLODIPine   Tablet 5 milliGRAM(s) Oral daily  aspirin 325 milliGRAM(s) Oral daily  atorvastatin 40 milliGRAM(s) Oral at bedtime  dextrose 40% Gel 15 Gram(s) Oral once  dextrose 5%. 1000 milliLiter(s) (50 mL/Hr) IV Continuous <Continuous>  dextrose 5%. 1000 milliLiter(s) (100 mL/Hr) IV Continuous <Continuous>  dextrose 50% Injectable 25 Gram(s) IV Push once  dextrose 50% Injectable 12.5 Gram(s) IV Push once  dextrose 50% Injectable 25 Gram(s) IV Push once  enoxaparin Injectable 40 milliGRAM(s) SubCutaneous every 12 hours  escitalopram 20 milliGRAM(s) Oral daily  furosemide   Injectable 40 milliGRAM(s) IV Push daily  glucagon  Injectable 1 milliGRAM(s) IntraMuscular once  influenza   Vaccine 0.5 milliLiter(s) IntraMuscular once  insulin glargine Injectable (LANTUS) 5 Unit(s) SubCutaneous every morning  insulin glargine Injectable (LANTUS) 5 Unit(s) SubCutaneous at bedtime  insulin lispro (ADMELOG) corrective regimen sliding scale   SubCutaneous three times a day before meals  insulin lispro (ADMELOG) corrective regimen sliding scale   SubCutaneous at bedtime  levothyroxine 150 MICROGram(s) Oral daily  lisinopril 40 milliGRAM(s) Oral daily  nebivolol 5 milliGRAM(s) Oral daily  oxybutynin 5 milliGRAM(s) Oral two times a day    MEDICATIONS  (PRN):  acetaminophen   Tablet .. 650 milliGRAM(s) Oral every 6 hours PRN Mild Pain (1 - 3)      RADIOLOGY & ADDITIONAL TESTS: CC: SOB  CHF exac (20 Sep 2021 16:51)    HPI:  81 y/o female w/ PMHx of Anxiety, knee osteoarthritis, Hypertension, Hypothyroidism, Morbid obesity, Panic attacks, Diabetes mellitus II, Urine incontinence on  lasix  every three days who presents to [facility]  [facility] with increasing shortness of breath.   Pt reports worsening  dyspnea with walking from room to room for the last week.   She also has  a dry cough, orthopnea,  chronic leg swelling.  Pt. stated that symptoms are worse at night.    She denies increase in sodium intake but admits to having cheese frequently and Progresso canned soup.      She had reported a post-nasal drip at night with a cough and was treated with an Antibiotic last month.  She said she felt better , however around  she started to have similar symptoms and also tried Mucinex. She c/o feeling tired despite all the recent treatments and reports having a dripping urinary incontinence for which DR. Stevens started her on Toviaz and Myrbetriq     Pt denies chest pain, no palpitations,  no fever, chills, no abd pain, no nausea, vomiting.     INTERVAL HPI/ OVERNIGHT EVENTS:  Chart reviewed, Pt was seen and examined,     Vital Signs Last 24 Hrs  T(C): 36.3 (21 Sep 2021 08:15), Max: 37 (20 Sep 2021 18:00)  T(F): 97.4 (21 Sep 2021 08:15), Max: 98.6 (20 Sep 2021 18:00)  HR: 55 (21 Sep 2021 08:15) (54 - 73)  BP: 127/70 (21 Sep 2021 08:15) (127/70 - 162/56)  BP(mean): 86 (20 Sep 2021 20:29) (79 - 91)  RR: 16 (21 Sep 2021 08:15) (14 - 22)  SpO2: 94% (21 Sep 2021 08:15) (88% - 99%)  I&O's Detail    20 Sep 2021 07:01  -  21 Sep 2021 07:00  --------------------------------------------------------  IN:  Total IN: 0 mL    OUT:    Voided (mL): 400 mL  Total OUT: 400 mL    Total NET: -400 mL        REVIEW OF SYSTEMS:    CONSTITUTIONAL: No weakness, fevers or chills  EYES/ENT: No visual changes;  No vertigo or throat pain   NECK: No pain or stiffness  RESPIRATORY: No cough, wheezing, hemoptysis; No shortness of breath  CARDIOVASCULAR: No chest pain or palpitations  GASTROINTESTINAL: No abdominal or epigastric pain. No nausea, vomiting, or hematemesis; No diarrhea or constipation. No melena or hematochezia.  GENITOURINARY: No dysuria, frequency or hematuria  NEUROLOGICAL: No numbness or weakness  SKIN: No itching, burning, rashes, or lesions   All other review of systems is negative unless indicated above.  PHYSICAL EXAM:    General: Well developed; well nourished; in no acute distress  Eyes: PERRLA, EOMI; conjunctiva and sclera clear  Head: Normocephalic; atraumatic  ENMT: No nasal discharge; airway clear  Neck: Supple; non tender; no masses  Respiratory: No wheezes, rales or rhonchi  Cardiovascular: Regular rate and rhythm. S1 and S2 Normal; No murmurs, gallops or rubs  Gastrointestinal: Soft non-tender non-distended; Normal bowel sounds  Genitourinary: No  suprapubic  tenderness  Extremities: Normal range of motion, No clubbing, cyanosis or edema  Vascular: Peripheral pulses palpable 2+ bilaterally  Neurological: Alert and oriented x4  Skin: Warm and dry. No acute rash  Lymph Nodes: No acute cervical adenopathy  Musculoskeletal: Normal muscle tone, without deformities  Psychiatric: Cooperative and appropriate  CARDIAC MARKERS ( 20 Sep 2021 12:04 )  <0.015 ng/mL / x     / x     / x     / x                                13.6   7.80  )-----------( 178      ( 20 Sep 2021 12:04 )             44.4     21 Sep 2021 07:18    140    |  106    |  14     ----------------------------<  139    3.7     |  32     |  0.53     Ca    9.0        21 Sep 2021 07:18    TPro  7.5    /  Alb  3.2    /  TBili  0.7    /  DBili  x      /  AST  17     /  ALT  22     /  AlkPhos  70     20 Sep 2021 12:04    PT/INR - ( 20 Sep 2021 12:48 )   PT: 12.1 sec;   INR: 1.04 ratio         PTT - ( 20 Sep 2021 12:48 )  PTT:34.2 sec  CAPILLARY BLOOD GLUCOSE      POCT Blood Glucose.: 123 mg/dL (21 Sep 2021 08:14)  POCT Blood Glucose.: 151 mg/dL (20 Sep 2021 21:18)    LIVER FUNCTIONS - ( 20 Sep 2021 12:04 )  Alb: 3.2 g/dL / Pro: 7.5 gm/dL / ALK PHOS: 70 U/L / ALT: 22 U/L / AST: 17 U/L / GGT: x           Urinalysis Basic - ( 20 Sep 2021 12:06 )    Color: Yellow / Appearance: Clear / S.010 / pH: x  Gluc: x / Ketone: Negative  / Bili: Negative / Urobili: Negative mg/dL   Blood: x / Protein: 30 mg/dL / Nitrite: Negative   Leuk Esterase: Negative / RBC: 3-5 /HPF / WBC 0-2   Sq Epi: x / Non Sq Epi: Negative / Bacteria: Occasional        MEDICATIONS  (STANDING):  amLODIPine   Tablet 5 milliGRAM(s) Oral daily  aspirin 325 milliGRAM(s) Oral daily  atorvastatin 40 milliGRAM(s) Oral at bedtime  dextrose 40% Gel 15 Gram(s) Oral once  dextrose 5%. 1000 milliLiter(s) (50 mL/Hr) IV Continuous <Continuous>  dextrose 5%. 1000 milliLiter(s) (100 mL/Hr) IV Continuous <Continuous>  dextrose 50% Injectable 25 Gram(s) IV Push once  dextrose 50% Injectable 12.5 Gram(s) IV Push once  dextrose 50% Injectable 25 Gram(s) IV Push once  enoxaparin Injectable 40 milliGRAM(s) SubCutaneous every 12 hours  escitalopram 20 milliGRAM(s) Oral daily  furosemide   Injectable 40 milliGRAM(s) IV Push daily  glucagon  Injectable 1 milliGRAM(s) IntraMuscular once  influenza   Vaccine 0.5 milliLiter(s) IntraMuscular once  insulin glargine Injectable (LANTUS) 5 Unit(s) SubCutaneous every morning  insulin glargine Injectable (LANTUS) 5 Unit(s) SubCutaneous at bedtime  insulin lispro (ADMELOG) corrective regimen sliding scale   SubCutaneous three times a day before meals  insulin lispro (ADMELOG) corrective regimen sliding scale   SubCutaneous at bedtime  levothyroxine 150 MICROGram(s) Oral daily  lisinopril 40 milliGRAM(s) Oral daily  nebivolol 5 milliGRAM(s) Oral daily  oxybutynin 5 milliGRAM(s) Oral two times a day    MEDICATIONS  (PRN):  acetaminophen   Tablet .. 650 milliGRAM(s) Oral every 6 hours PRN Mild Pain (1 - 3)      RADIOLOGY & ADDITIONAL TESTS: CC: SOB  CHF exac     HPI:  81 y/o female w/ PMHx of Anxiety, knee osteoarthritis, Hypertension, Hypothyroidism, Morbid obesity, Panic attacks, Diabetes mellitus II, Urine incontinence on  lasix  every three days who presents to [facility]  [facility] with increasing shortness of breath.   Pt reports worsening  dyspnea with walking from room to room for the last week.   She also has  a dry cough, orthopnea,  chronic leg swelling.  Pt. stated that symptoms are worse at night.    She denies increase in sodium intake but admits to having cheese frequently and Progresso canned soup.      She had reported a post-nasal drip at night with a cough and was treated with an Antibiotic last month.  She said she felt better , however around  she started to have similar symptoms and also tried Mucinex. She c/o feeling tired despite all the recent treatments and reports having a dripping urinary incontinence for which DR. Stevens started her on Toviaz and Myrbetriq     Pt denies chest pain, no palpitations,  no fever, chills, no abd pain, no nausea, vomiting.     INTERVAL HPI/ OVERNIGHT EVENTS:  Chart reviewed, Pt was seen and examined, confirmed history above, still feels some SOB but better, still requires O2 via NC. Pt reports no  past history of CHF, no COPD/Asthma. + chronic LLE edema due to previous Sx, but had leg edema b/l lately, better today. Pt reports good UO after lasix     Vital Signs Last 24 Hrs  T(C): 36.3 (21 Sep 2021 08:15), Max: 37 (20 Sep 2021 18:00)  T(F): 97.4 (21 Sep 2021 08:15), Max: 98.6 (20 Sep 2021 18:00)  HR: 55 (21 Sep 2021 08:15) (54 - 73)  BP: 127/70 (21 Sep 2021 08:15) (127/70 - 162/56)  BP(mean): 86 (20 Sep 2021 20:29) (79 - 91)  RR: 16 (21 Sep 2021 08:15) (14 - 22)  SpO2: 94% (21 Sep 2021 08:15) (88% - 99%)      REVIEW OF SYSTEMS:  All other review of systems is negative unless indicated above.        PHYSICAL EXAM:  General: Well developed; well nourished; in no acute distress  Eyes: PERRLA, EOMI; conjunctiva and sclera clear  Head: Normocephalic; atraumatic  ENMT: No nasal discharge; airway clear  Neck: Supple; non tender; no masses  Respiratory: No wheezes, rales or rhonchi  Cardiovascular: Regular rate and rhythm. S1 and S2 Normal; No murmurs, gallops or rubs  Gastrointestinal: Soft non-tender non-distended; Normal bowel sounds  Genitourinary: No  suprapubic  tenderness  Extremities: Normal range of motion, No clubbing, cyanosis or edema  Vascular: Peripheral pulses palpable 2+ bilaterally  Neurological: Alert and oriented x4  Skin: Warm and dry. No acute rash  Lymph Nodes: No acute cervical adenopathy  Musculoskeletal: Normal muscle tone, without deformities  Psychiatric: Cooperative and appropriate      LABS:   CARDIAC MARKERS ( 20 Sep 2021 12:04 )  <0.015 ng/mL / x     / x     / x     / x                                13.6   7.80  )-----------( 178      ( 20 Sep 2021 12:04 )             44.4     21 Sep 2021 07:18    140    |  106    |  14     ----------------------------<  139    3.7     |  32     |  0.53     Ca    9.0        21 Sep 2021 07:18    TPro  7.5    /  Alb  3.2    /  TBili  0.7    /  DBili  x      /  AST  17     /  ALT  22     /  AlkPhos  70     20 Sep 2021 12:0  PT/INR - ( 20 Sep 2021 12:48 )   PT: 12.1 sec;   INR: 1.04 ratio    PTT - ( 20 Sep 2021 12:48 )  PTT:34.2 sec        CAPILLARY BLOOD GLUCOSE  POCT Blood Glucose.: 123 mg/dL (21 Sep 2021 08:14)  POCT Blood Glucose.: 151 mg/dL (20 Sep 2021 21:18)    LIVER FUNCTIONS - ( 20 Sep 2021 12:04 )  Alb: 3.2 g/dL / Pro: 7.5 gm/dL / ALK PHOS: 70 U/L / ALT: 22 U/L / AST: 17 U/L / GGT: x           Urinalysis Basic - ( 20 Sep 2021 12:06 )  Color: Yellow / Appearance: Clear / S.010 / pH: x  Gluc: x / Ketone: Negative  / Bili: Negative / Urobili: Negative mg/dL   Blood: x / Protein: 30 mg/dL / Nitrite: Negative   Leuk Esterase: Negative / RBC: 3-5 /HPF / WBC 0-2   Sq Epi: x / Non Sq Epi: Negative / Bacteria: Occasional        MEDICATIONS  (STANDING):  amLODIPine   Tablet 5 milliGRAM(s) Oral daily  aspirin 325 milliGRAM(s) Oral daily  atorvastatin 40 milliGRAM(s) Oral at bedtime  dextrose 40% Gel 15 Gram(s) Oral once  dextrose 5%. 1000 milliLiter(s) (50 mL/Hr) IV Continuous <Continuous>  dextrose 5%. 1000 milliLiter(s) (100 mL/Hr) IV Continuous <Continuous>  dextrose 50% Injectable 25 Gram(s) IV Push once  dextrose 50% Injectable 12.5 Gram(s) IV Push once  dextrose 50% Injectable 25 Gram(s) IV Push once  enoxaparin Injectable 40 milliGRAM(s) SubCutaneous every 12 hours  escitalopram 20 milliGRAM(s) Oral daily  furosemide   Injectable 40 milliGRAM(s) IV Push daily  glucagon  Injectable 1 milliGRAM(s) IntraMuscular once  influenza   Vaccine 0.5 milliLiter(s) IntraMuscular once  insulin glargine Injectable (LANTUS) 5 Unit(s) SubCutaneous every morning  insulin glargine Injectable (LANTUS) 5 Unit(s) SubCutaneous at bedtime  insulin lispro (ADMELOG) corrective regimen sliding scale   SubCutaneous three times a day before meals  insulin lispro (ADMELOG) corrective regimen sliding scale   SubCutaneous at bedtime  levothyroxine 150 MICROGram(s) Oral daily  lisinopril 40 milliGRAM(s) Oral daily  nebivolol 5 milliGRAM(s) Oral daily  oxybutynin 5 milliGRAM(s) Oral two times a day    MEDICATIONS  (PRN):  acetaminophen   Tablet .. 650 milliGRAM(s) Oral every 6 hours PRN Mild Pain (1 - 3)      RADIOLOGY & ADDITIONAL TESTS:          EXAM:  US DPLX LWR EXT VEINS COMPL BI                        PROCEDURE DATE:  2021    FINDINGS:    RIGHT:  Normal compressibility of the RIGHT common femoral, femoral and popliteal veins.  Doppler examination shows normal spontaneous and phasic flow.  No RIGHT calf vein thrombosis is detected.    LEFT:  Normal compressibility of the LEFT common femoral, femoral and popliteal veins.  Doppler examination shows normal spontaneous and phasic flow.  No LEFT calf vein thrombosis is detected.    IMPRESSION:  No evidence of deep venous thrombosis in either lower extremity.              EXAM:  CT ANGIO CHEST PULM ART [facility]                        PROCEDURE DATE:  2021    FINDINGS:    LUNGS AND AIRWAYS: Patent central airways.  Mild pulmonary edema. Bilateral lower lobe compressive atelectasis.  PLEURA: Small to mild bilateral pleural effusions.  MEDIASTINUM AND ARMOND: Several prevascular lymph nodes measuring up to 1.3 cm. Subcentimeter subcarinal and bilateral hilar lymph nodes.  VESSELS: Atherosclerotic changes of the aorta and coronary vasculature. No aortic aneurysm. No evidence of acute pulmonary embolism.  HEART: Heart size is normal. No pericardial effusion.  CHEST WALL AND LOWER NECK: Within normal limits.  VISUALIZED UPPER ABDOMEN: Hepatic steatosis. Gallstones. Calcification along the right hepatic dome. Subcentimeter indeterminate nodularity of the bilateral adrenal glands. Posterior right upper pole renalcyst.  BONES: Degenerative changes.    IMPRESSION:  No evidence of acute pulmonary embolism.  Pulmonary edema with bilateral pleural effusions and bibasilar compressive atelectasis.  Indeterminate subcentimeter bilateral adrenal nodularity.

## 2021-09-22 LAB
ANION GAP SERPL CALC-SCNC: 4 MMOL/L — LOW (ref 5–17)
BUN SERPL-MCNC: 16 MG/DL — SIGNIFICANT CHANGE UP (ref 7–23)
CALCIUM SERPL-MCNC: 9.1 MG/DL — SIGNIFICANT CHANGE UP (ref 8.5–10.1)
CHLORIDE SERPL-SCNC: 104 MMOL/L — SIGNIFICANT CHANGE UP (ref 96–108)
CO2 SERPL-SCNC: 32 MMOL/L — HIGH (ref 22–31)
CREAT SERPL-MCNC: 0.5 MG/DL — SIGNIFICANT CHANGE UP (ref 0.5–1.3)
CULTURE RESULTS: SIGNIFICANT CHANGE UP
GLUCOSE SERPL-MCNC: 153 MG/DL — HIGH (ref 70–99)
HCT VFR BLD CALC: 44.6 % — SIGNIFICANT CHANGE UP (ref 34.5–45)
HGB BLD-MCNC: 13.7 G/DL — SIGNIFICANT CHANGE UP (ref 11.5–15.5)
MCHC RBC-ENTMCNC: 28.3 PG — SIGNIFICANT CHANGE UP (ref 27–34)
MCHC RBC-ENTMCNC: 30.7 GM/DL — LOW (ref 32–36)
MCV RBC AUTO: 92.1 FL — SIGNIFICANT CHANGE UP (ref 80–100)
NT-PROBNP SERPL-SCNC: 103 PG/ML — SIGNIFICANT CHANGE UP (ref 0–450)
PLATELET # BLD AUTO: 170 K/UL — SIGNIFICANT CHANGE UP (ref 150–400)
POTASSIUM SERPL-MCNC: 3.5 MMOL/L — SIGNIFICANT CHANGE UP (ref 3.5–5.3)
POTASSIUM SERPL-SCNC: 3.5 MMOL/L — SIGNIFICANT CHANGE UP (ref 3.5–5.3)
RBC # BLD: 4.84 M/UL — SIGNIFICANT CHANGE UP (ref 3.8–5.2)
RBC # FLD: 15.5 % — HIGH (ref 10.3–14.5)
SODIUM SERPL-SCNC: 140 MMOL/L — SIGNIFICANT CHANGE UP (ref 135–145)
SPECIMEN SOURCE: SIGNIFICANT CHANGE UP
TSH SERPL-MCNC: 2.05 UU/ML — SIGNIFICANT CHANGE UP (ref 0.34–4.82)
WBC # BLD: 7.9 K/UL — SIGNIFICANT CHANGE UP (ref 3.8–10.5)
WBC # FLD AUTO: 7.9 K/UL — SIGNIFICANT CHANGE UP (ref 3.8–10.5)

## 2021-09-22 PROCEDURE — 99233 SBSQ HOSP IP/OBS HIGH 50: CPT

## 2021-09-22 RX ORDER — FUROSEMIDE 40 MG
40 TABLET ORAL
Refills: 0 | Status: DISCONTINUED | OUTPATIENT
Start: 2021-09-22 | End: 2021-09-23

## 2021-09-22 RX ADMIN — Medication 40 MILLIGRAM(S): at 09:37

## 2021-09-22 RX ADMIN — Medication 150 MICROGRAM(S): at 05:44

## 2021-09-22 RX ADMIN — ATORVASTATIN CALCIUM 40 MILLIGRAM(S): 80 TABLET, FILM COATED ORAL at 21:27

## 2021-09-22 RX ADMIN — Medication 40 MILLIGRAM(S): at 21:27

## 2021-09-22 RX ADMIN — Medication 325 MILLIGRAM(S): at 09:36

## 2021-09-22 RX ADMIN — Medication 5 MILLIGRAM(S): at 21:26

## 2021-09-22 RX ADMIN — Medication 5 MILLIGRAM(S): at 13:54

## 2021-09-22 RX ADMIN — ESCITALOPRAM OXALATE 20 MILLIGRAM(S): 10 TABLET, FILM COATED ORAL at 09:37

## 2021-09-22 RX ADMIN — Medication 1: at 08:25

## 2021-09-22 RX ADMIN — INSULIN GLARGINE 5 UNIT(S): 100 INJECTION, SOLUTION SUBCUTANEOUS at 08:26

## 2021-09-22 RX ADMIN — ENOXAPARIN SODIUM 40 MILLIGRAM(S): 100 INJECTION SUBCUTANEOUS at 21:26

## 2021-09-22 RX ADMIN — LISINOPRIL 40 MILLIGRAM(S): 2.5 TABLET ORAL at 09:37

## 2021-09-22 RX ADMIN — AMLODIPINE BESYLATE 5 MILLIGRAM(S): 2.5 TABLET ORAL at 09:37

## 2021-09-22 RX ADMIN — ENOXAPARIN SODIUM 40 MILLIGRAM(S): 100 INJECTION SUBCUTANEOUS at 09:37

## 2021-09-22 RX ADMIN — INSULIN GLARGINE 5 UNIT(S): 100 INJECTION, SOLUTION SUBCUTANEOUS at 21:26

## 2021-09-22 NOTE — PROGRESS NOTE ADULT - SUBJECTIVE AND OBJECTIVE BOX
CC: SOB  CHF exac     HPI:  81 y/o female w/ PMHx of Anxiety, knee osteoarthritis, Hypertension, Hypothyroidism, Morbid obesity, Panic attacks, Diabetes mellitus II, Urine incontinence on  lasix  every three days who presents to [place]  ED with increasing shortness of breath.   Pt reports worsening  dyspnea with walking from room to room for the last week.   She also has  a dry cough, orthopnea,  chronic leg swelling.  Pt. stated that symptoms are worse at night.    She denies increase in sodium intake but admits to having cheese frequently and Progresso canned soup.      She had reported a post-nasal drip at night with a cough and was treated with an Antibiotic last month.  She said she felt better , however around  she started to have similar symptoms and also tried Mucinex. She c/o feeling tired despite all the recent treatments and reports having a dripping urinary incontinence for which DR. Stevens started her on Toviaz and Myrbetriq     Pt denies chest pain, no palpitations,  no fever, chills, no abd pain, no nausea, vomiting.     INTERVAL HPI/ OVERNIGHT EVENTS:  , Pt was seen and examined,       Vital Signs Last 24 Hrs  T(C): 36.7 (22 Sep 2021 08:15), Max: 36.9 (21 Sep 2021 23:39)  T(F): 98 (22 Sep 2021 08:15), Max: 98.5 (21 Sep 2021 23:39)  HR: 61 (22 Sep 2021 08:15) (52 - 61)  BP: 137/70 (22 Sep 2021 08:15) (137/70 - 159/65)  RR: 18 (22 Sep 2021 08:15) (16 - 18)  SpO2: 97% (22 Sep 2021 08:15) (92% - 97%)    REVIEW OF SYSTEMS:  All other review of systems is negative unless indicated above.        PHYSICAL EXAM:  General: Well developed; well nourished; in no acute distress  Eyes: PERRLA, EOMI; conjunctiva and sclera clear  Head: Normocephalic; atraumatic  ENMT: No nasal discharge; airway clear  Neck: Supple; non tender; no masses  Respiratory: No wheezes, rales or rhonchi  Cardiovascular: Regular rate and rhythm. S1 and S2 Normal; No murmurs, gallops or rubs  Gastrointestinal: Soft non-tender non-distended; Normal bowel sounds  Genitourinary: No  suprapubic  tenderness  Extremities: Normal range of motion, No clubbing, cyanosis or edema  Vascular: Peripheral pulses palpable 2+ bilaterally  Neurological: Alert and oriented x4  Skin: Warm and dry. No acute rash  Lymph Nodes: No acute cervical adenopathy  Musculoskeletal: Normal muscle tone, without deformities  Psychiatric: Cooperative and appropriate      LABS:   CARDIAC MARKERS ( 20 Sep 2021 12:04 )  <0.015 ng/mL / x     / x     / x     / x                                13.6   7.80  )-----------( 178      ( 20 Sep 2021 12:04 )             44.4     21 Sep 2021 07:18    140    |  106    |  14     ----------------------------<  139    3.7     |  32     |  0.53     Ca    9.0        21 Sep 2021 07:18    TPro  7.5    /  Alb  3.2    /  TBili  0.7    /  DBili  x      /  AST  17     /  ALT  22     /  AlkPhos  70     20 Sep 2021 12:0  PT/INR - ( 20 Sep 2021 12:48 )   PT: 12.1 sec;   INR: 1.04 ratio    PTT - ( 20 Sep 2021 12:48 )  PTT:34.2 sec        CAPILLARY BLOOD GLUCOSE  POCT Blood Glucose.: 123 mg/dL (21 Sep 2021 08:14)  POCT Blood Glucose.: 151 mg/dL (20 Sep 2021 21:18)    LIVER FUNCTIONS - ( 20 Sep 2021 12:04 )  Alb: 3.2 g/dL / Pro: 7.5 gm/dL / ALK PHOS: 70 U/L / ALT: 22 U/L / AST: 17 U/L / GGT: x           Urinalysis Basic - ( 20 Sep 2021 12:06 )  Color: Yellow / Appearance: Clear / S.010 / pH: x  Gluc: x / Ketone: Negative  / Bili: Negative / Urobili: Negative mg/dL   Blood: x / Protein: 30 mg/dL / Nitrite: Negative   Leuk Esterase: Negative / RBC: 3-5 /HPF / WBC 0-2   Sq Epi: x / Non Sq Epi: Negative / Bacteria: Occasional        MEDICATIONS  (STANDING):  amLODIPine   Tablet 5 milliGRAM(s) Oral daily  aspirin 325 milliGRAM(s) Oral daily  atorvastatin 40 milliGRAM(s) Oral at bedtime  dextrose 40% Gel 15 Gram(s) Oral once  dextrose 5%. 1000 milliLiter(s) (50 mL/Hr) IV Continuous <Continuous>  dextrose 5%. 1000 milliLiter(s) (100 mL/Hr) IV Continuous <Continuous>  dextrose 50% Injectable 25 Gram(s) IV Push once  dextrose 50% Injectable 12.5 Gram(s) IV Push once  dextrose 50% Injectable 25 Gram(s) IV Push once  enoxaparin Injectable 40 milliGRAM(s) SubCutaneous every 12 hours  escitalopram 20 milliGRAM(s) Oral daily  furosemide   Injectable 40 milliGRAM(s) IV Push daily  glucagon  Injectable 1 milliGRAM(s) IntraMuscular once  influenza   Vaccine 0.5 milliLiter(s) IntraMuscular once  insulin glargine Injectable (LANTUS) 5 Unit(s) SubCutaneous every morning  insulin glargine Injectable (LANTUS) 5 Unit(s) SubCutaneous at bedtime  insulin lispro (ADMELOG) corrective regimen sliding scale   SubCutaneous three times a day before meals  insulin lispro (ADMELOG) corrective regimen sliding scale   SubCutaneous at bedtime  levothyroxine 150 MICROGram(s) Oral daily  lisinopril 40 milliGRAM(s) Oral daily  nebivolol 5 milliGRAM(s) Oral daily  oxybutynin 5 milliGRAM(s) Oral two times a day    MEDICATIONS  (PRN):  acetaminophen   Tablet .. 650 milliGRAM(s) Oral every 6 hours PRN Mild Pain (1 - 3)      RADIOLOGY & ADDITIONAL TESTS:          EXAM:  US DPLX LWR EXT VEINS COMPL BI                        PROCEDURE DATE:  2021    FINDINGS:    RIGHT:  Normal compressibility of the RIGHT common femoral, femoral and popliteal veins.  Doppler examination shows normal spontaneous and phasic flow.  No RIGHT calf vein thrombosis is detected.    LEFT:  Normal compressibility of the LEFT common femoral, femoral and popliteal veins.  Doppler examination shows normal spontaneous and phasic flow.  No LEFT calf vein thrombosis is detected.    IMPRESSION:  No evidence of deep venous thrombosis in either lower extremity.              [facility]  [facility]                        PROCEDURE DATE:  2021    FINDINGS:    LUNGS AND AIRWAYS: Patent central airways.  Mild pulmonary edema. Bilateral lower lobe compressive atelectasis.  PLEURA: Small to mild bilateral pleural effusions.  MEDIASTINUM AND ARMOND: Several prevascular lymph nodes measuring up to 1.3 cm. Subcentimeter subcarinal and bilateral hilar lymph nodes.  VESSELS: Atherosclerotic changes of the aorta and coronary vasculature. No aortic aneurysm. No evidence of acute pulmonary embolism.  HEART: Heart size is normal. No pericardial effusion.  CHEST WALL AND LOWER NECK: Within normal limits.  VISUALIZED UPPER ABDOMEN: Hepatic steatosis. Gallstones. Calcification along the right hepatic dome. Subcentimeter indeterminate nodularity of the bilateral adrenal glands. Posterior right upper pole renalcyst.  BONES: Degenerative changes.    IMPRESSION:  No evidence of acute pulmonary embolism.  Pulmonary edema with bilateral pleural effusions and bibasilar compressive atelectasis.  Indeterminate subcentimeter bilateral adrenal nodularity.             CC: SOB  CHF exac     HPI:  79 y/o female w/ PMHx of Anxiety, knee osteoarthritis, Hypertension, Hypothyroidism, Morbid obesity, Panic attacks, Diabetes mellitus II, Urine incontinence on  lasix  every three days who presents to [place]  [facility] with increasing shortness of breath.   Pt reports worsening  dyspnea with walking from room to room for the last week.   She also has  a dry cough, orthopnea,  chronic leg swelling.  Pt. stated that symptoms are worse at night.    She denies increase in sodium intake but admits to having cheese frequently and Progresso canned soup.      She had reported a post-nasal drip at night with a cough and was treated with an Antibiotic last month.  She said she felt better , however around  she started to have similar symptoms and also tried Mucinex. She c/o feeling tired despite all the recent treatments and reports having a dripping urinary incontinence for which DR. Stevens started her on Toviaz and Myrbetriq     Pt denies chest pain, no palpitations,  no fever, chills, no abd pain, no nausea, vomiting.     INTERVAL HPI/ OVERNIGHT EVENTS:  Pt was seen and examined, feels better, still on NC , SOB with ambulation, leg edema is improving       Vital Signs Last 24 Hrs  T(C): 36.7 (22 Sep 2021 08:15), Max: 36.9 (21 Sep 2021 23:39)  T(F): 98 (22 Sep 2021 08:15), Max: 98.5 (21 Sep 2021 23:39)  HR: 61 (22 Sep 2021 08:15) (52 - 61)  BP: 137/70 (22 Sep 2021 08:15) (137/70 - 159/65)  RR: 18 (22 Sep 2021 08:15) (16 - 18)  SpO2: 97% (22 Sep 2021 08:15) (92% - 97%)    REVIEW OF SYSTEMS:  All other review of systems is negative unless indicated above.        PHYSICAL EXAM:  General: Well developed; well nourished; in no acute distress  Eyes: PERRLA, EOMI; conjunctiva and sclera clear  Head: Normocephalic; atraumatic  ENMT: No nasal discharge; airway clear  Neck: Supple; non tender; no masses  Respiratory: Diminished BS at bases, mild  rales   Cardiovascular: Regular rate and rhythm. S1 and S2 Normal;   Gastrointestinal: Soft non-tender non-distended; Normal bowel sounds  Genitourinary: No  suprapubic  tenderness  Extremities:  edema improving   Vascular: Peripheral pulses palpable 2+ bilaterally  Neurological: Alert and oriented x3, non focal   Musculoskeletal: Normal muscle tone and strength   Psychiatric: Cooperative and appropriate      LABS:                           13.7   7.90  )-----------( 170      ( 22 Sep 2021 07:44 )             44.6     09-22    140  |  104  |  16  ----------------------------<  153<H>  3.5   |  32<H>  |  0.50    Ca    9.1      22 Sep 2021 07:44        CARDIAC MARKERS ( 20 Sep 2021 12:04 )  <0.015 ng/mL / x     / x     / x     / x                                13.6   7.80  )-----------( 178      ( 20 Sep 2021 12:04 )             44.4     21 Sep 2021 07:18    140    |  106    |  14     ----------------------------<  139    3.7     |  32     |  0.53     Ca    9.0        21 Sep 2021 07:18    TPro  7.5    /  Alb  3.2    /  TBili  0.7    /  DBili  x      /  AST  17     /  ALT  22     /  AlkPhos  70     20 Sep 2021 12:0  PT/INR - ( 20 Sep 2021 12:48 )   PT: 12.1 sec;   INR: 1.04 ratio    PTT - ( 20 Sep 2021 12:48 )  PTT:34.2 sec        CAPILLARY BLOOD GLUCOSE  POCT Blood Glucose.: 139 mg/dL (22 Sep 2021 12:30)  POCT Blood Glucose.: 167 mg/dL (22 Sep 2021 08:07)  POCT Blood Glucose.: 132 mg/dL (21 Sep 2021 21:06)      LIVER FUNCTIONS - ( 20 Sep 2021 12:04 )  Alb: 3.2 g/dL / Pro: 7.5 gm/dL / ALK PHOS: 70 U/L / ALT: 22 U/L / AST: 17 U/L / GGT: x           Urinalysis Basic - ( 20 Sep 2021 12:06 )  Color: Yellow / Appearance: Clear / S.010 / pH: x  Gluc: x / Ketone: Negative  / Bili: Negative / Urobili: Negative mg/dL   Blood: x / Protein: 30 mg/dL / Nitrite: Negative   Leuk Esterase: Negative / RBC: 3-5 /HPF / WBC 0-2   Sq Epi: x / Non Sq Epi: Negative / Bacteria: Occasional    Culture - Urine (21 @ 12:06)   Specimen Source: Clean Catch Clean Catch (Midstream)   Culture Results:   <10,000 CFU/mL Normal Urogenital Danica     Culture - Blood (21 @ 12:04)   Specimen Source: .Blood Blood-Peripheral   Culture Results:   No growth to date.     MEDICATIONS  (STANDING):  amLODIPine   Tablet 5 milliGRAM(s) Oral daily  aspirin 325 milliGRAM(s) Oral daily  atorvastatin 40 milliGRAM(s) Oral at bedtime  dextrose 40% Gel 15 Gram(s) Oral once  dextrose 5%. 1000 milliLiter(s) (50 mL/Hr) IV Continuous <Continuous>  dextrose 5%. 1000 milliLiter(s) (100 mL/Hr) IV Continuous <Continuous>  dextrose 50% Injectable 25 Gram(s) IV Push once  dextrose 50% Injectable 12.5 Gram(s) IV Push once  dextrose 50% Injectable 25 Gram(s) IV Push once  enoxaparin Injectable 40 milliGRAM(s) SubCutaneous every 12 hours  escitalopram 20 milliGRAM(s) Oral daily  furosemide   Injectable 40 milliGRAM(s) IV Push daily  glucagon  Injectable 1 milliGRAM(s) IntraMuscular once  influenza   Vaccine 0.5 milliLiter(s) IntraMuscular once  insulin glargine Injectable (LANTUS) 5 Unit(s) SubCutaneous every morning  insulin glargine Injectable (LANTUS) 5 Unit(s) SubCutaneous at bedtime  insulin lispro (ADMELOG) corrective regimen sliding scale   SubCutaneous three times a day before meals  insulin lispro (ADMELOG) corrective regimen sliding scale   SubCutaneous at bedtime  levothyroxine 150 MICROGram(s) Oral daily  lisinopril 40 milliGRAM(s) Oral daily  nebivolol 5 milliGRAM(s) Oral daily  oxybutynin 5 milliGRAM(s) Oral two times a day    MEDICATIONS  (PRN):  acetaminophen   Tablet .. 650 milliGRAM(s) Oral every 6 hours PRN Mild Pain (1 - 3)      RADIOLOGY & ADDITIONAL TESTS:          EXAM:  US DPLX LWR EXT VEINS COMPL BI                        PROCEDURE DATE:  2021    FINDINGS:    RIGHT:  Normal compressibility of the RIGHT common femoral, femoral and popliteal veins.  Doppler examination shows normal spontaneous and phasic flow.  No RIGHT calf vein thrombosis is detected.    LEFT:  Normal compressibility of the LEFT common femoral, femoral and popliteal veins.  Doppler examination shows normal spontaneous and phasic flow.  No LEFT calf vein thrombosis is detected.    IMPRESSION:  No evidence of deep venous thrombosis in either lower extremity.              [facility]  [facility]                        PROCEDURE DATE:  2021    FINDINGS:    LUNGS AND AIRWAYS: Patent central airways.  Mild pulmonary edema. Bilateral lower lobe compressive atelectasis.  PLEURA: Small to mild bilateral pleural effusions.  MEDIASTINUM AND ARMOND: Several prevascular lymph nodes measuring up to 1.3 cm. Subcentimeter subcarinal and bilateral hilar lymph nodes.  VESSELS: Atherosclerotic changes of the aorta and coronary vasculature. No aortic aneurysm. No evidence of acute pulmonary embolism.  HEART: Heart size is normal. No pericardial effusion.  CHEST WALL AND LOWER NECK: Within normal limits.  VISUALIZED UPPER ABDOMEN: Hepatic steatosis. Gallstones. Calcification along the right hepatic dome. Subcentimeter indeterminate nodularity of the bilateral adrenal glands. Posterior right upper pole renalcyst.  BONES: Degenerative changes.    IMPRESSION:  No evidence of acute pulmonary embolism.  Pulmonary edema with bilateral pleural effusions and bibasilar compressive atelectasis.  Indeterminate subcentimeter bilateral adrenal nodularity.

## 2021-09-22 NOTE — PROGRESS NOTE ADULT - SUBJECTIVE AND OBJECTIVE BOX
PCP:    REQUESTING PHYSICIAN:    REASON FOR CONSULT:    CHIEF COMPLAINT:    HPI:  Pt is a pleasant 81 y/o female w/ PMHx of Anxiety, knee osteoarthritis, Hypertension, Hypothyroidism, Morbid obesity, Panic attacks, Diabetes mellitus II, Urine incontinence on  lasix  every three days who presents to [place]  ED with increasing shortness of breath   Pt reports worsening  dyspnea with walking from room to room for the last week  with cough which is dry gives hx of post nasal drip , seasonal allergies .   She also has  a dry cough, orthopnea,  chronic leg swelling.  Pt states  that symptoms are worse at night.  Patient denies any associated with chest pain or tightness ,  but sob gets worse with exertion ,some times wheeze    She denies increase in sodium intake but admits to having cheese frequently and Progresso canned soup.      She had reported a post-nasal drip at night with a cough and was treated with an Antibiotic last month.  She said she felt better , however around  she started to have similar symptoms and also tried Mucinex. She c/o feeling tired despite all the recent treatments and reports having a dripping urinary incontinence for which DR. Stevens started her on Toviaz and Myrbetriq     Pt denies chest pain, no palpitations,  no fever, chills, no abd pain, no nausea, vomiting Patient blood work showed normal PRO BNP LEVEL  negative troponin  21 Pt denies chest pain. Reports post nasal drip. Bradycardia at times      PAST MEDICAL & SURGICAL HISTORY:  Anxiety    Type 2 diabetes mellitus    Hypothyroid    Hypertension    History of osteoarthritis  knees    Morbid obesity    Panic attacks    Ankle swelling  b/l    Urine incontinence    Status post dilation and curettage    Elective surgery  insertion of pessary later removed    S/P T&amp;A (status post tonsillectomy and adenoidectomy)    S/P cataract surgery  b/l    S/P ORIF (open reduction internal fixation) fracture  left ankle    H/O colonoscopy    History of esophagogastroduodenoscopy (EGD)        SOCIAL HISTORY:    FAMILY HISTORY:  FHx: obesity  Pt is adopted . No other history        ALLERGIES:  Allergies    Allergy Status Unknown    Intolerances    epinephrine (Other)      MEDICATIONS:    MEDICATIONS  (STANDING):  aspirin 325 milliGRAM(s) Oral daily  atorvastatin 40 milliGRAM(s) Oral at bedtime  dextrose 40% Gel 15 Gram(s) Oral once  dextrose 5%. 1000 milliLiter(s) (50 mL/Hr) IV Continuous <Continuous>  dextrose 5%. 1000 milliLiter(s) (100 mL/Hr) IV Continuous <Continuous>  dextrose 50% Injectable 25 Gram(s) IV Push once  dextrose 50% Injectable 12.5 Gram(s) IV Push once  dextrose 50% Injectable 25 Gram(s) IV Push once  enoxaparin Injectable 40 milliGRAM(s) SubCutaneous every 12 hours  escitalopram 20 milliGRAM(s) Oral daily  furosemide   Injectable 40 milliGRAM(s) IV Push two times a day  glucagon  Injectable 1 milliGRAM(s) IntraMuscular once  influenza   Vaccine 0.5 milliLiter(s) IntraMuscular once  insulin glargine Injectable (LANTUS) 5 Unit(s) SubCutaneous every morning  insulin glargine Injectable (LANTUS) 5 Unit(s) SubCutaneous at bedtime  insulin lispro (ADMELOG) corrective regimen sliding scale   SubCutaneous three times a day before meals  insulin lispro (ADMELOG) corrective regimen sliding scale   SubCutaneous at bedtime  levothyroxine 150 MICROGram(s) Oral daily  lisinopril 40 milliGRAM(s) Oral daily  nebivolol 5 milliGRAM(s) Oral daily  oxybutynin 5 milliGRAM(s) Oral two times a day    MEDICATIONS  (PRN):  acetaminophen   Tablet .. 650 milliGRAM(s) Oral every 6 hours PRN Mild Pain (1 - 3)        Vital Signs Last 24 Hrs  T(C): 36.7 (22 Sep 2021 08:15), Max: 36.9 (21 Sep 2021 23:39)  T(F): 98 (22 Sep 2021 08:15), Max: 98.5 (21 Sep 2021 23:39)  HR: 61 (22 Sep 2021 08:15) (52 - 61)  BP: 137/70 (22 Sep 2021 08:15) (137/70 - 159/65)  BP(mean): --  RR: 18 (22 Sep 2021 08:15) (16 - 18)  SpO2: 97% (22 Sep 2021 08:15) (92% - 97%)Daily     Daily Weight in k.2 (22 Sep 2021 06:13)I&O's Summary    21 Sep 2021 07:01  -  22 Sep 2021 07:00  --------------------------------------------------------  IN: 0 mL / OUT: 1850 mL / NET: -1850 mL        PHYSICAL EXAM:    Constitutional: NAD, awake and alert, obese  HEENT: PERR, EOMI,  No oral cyananosis.  Neck:  supple,  No JVD  Respiratory: Breath sounds are clear bilaterally, No wheezing, rales or rhonchi  Cardiovascular: S1 and S2, regular rate and rhythm, no Murmurs, gallops or rubs  Gastrointestinal: Bowel Sounds present, soft, nontender.   Extremities: No peripheral edema. No clubbing or cyanosis.  Vascular: 2+ peripheral pulses  Neurological: A/O x 3, no focal deficits  Musculoskeletal: no calf tenderness.  Skin: No rashes.      LABS: All Labs Reviewed:                        13.7   7.90  )-----------( 170      ( 22 Sep 2021 07:44 )             44.6                         13.6   7.80  )-----------( 178      ( 20 Sep 2021 12:04 )             44.4     22 Sep 2021 07:44    140    |  104    |  16     ----------------------------<  153    3.5     |  32     |  0.50   21 Sep 2021 07:18    140    |  106    |  14     ----------------------------<  139    3.7     |  32     |  0.53   20 Sep 2021 12:04    141    |  107    |  12     ----------------------------<  153    4.2     |  31     |  0.60     Ca    9.1        22 Sep 2021 07:44  Ca    9.0        21 Sep 2021 07:18  Ca    9.3        20 Sep 2021 12:04    TPro  7.5    /  Alb  3.2    /  TBili  0.7    /  DBili  x      /  AST  17     /  ALT  22     /  AlkPhos  70     20 Sep 2021 12:04          Blood Culture: Organism --  Gram Stain Blood -- Gram Stain --  Specimen Source .Blood Blood-Peripheral  Culture-Blood --       @ 07:44  Pro Bnp 103  09-21 @ 07:18  Pro Bnp 165   @ 12:04  Pro Bnp 241     @ 07:44  TSH: 2.05      RADIOLOGY/EKG:< from: 12 Lead ECG (21 @ 11:28) >  Diagnosis Line Normal sinus rhythm  Left axis deviation  Septal infarct (cited on or before 14-MAR-2013)  Abnormal ECG  When compared with ECG of 2020 11:03,  Incomplete right bundle branch block is no longer Present  Confirmed by MD LAY, LUISA ([id number]) on 2021 2:18:14 PM     < end of copied text >        ECHO/CARDIAC CATHTERIZATION/STRESS TEST:  < from: TTE Echo Complete w/o Contrast w/ Doppler (21 @ 08:09) >   Impression     Summary     The mitral valve leaflets appear thickened.   Moderate mitral annular calcification is present.   Moderate (2+) mitral regurgitation is present.   Fibrocalcific changes noted to the Aortic valve leaflets with preserved   leaflet excursion.   Mild to Moderate Tricuspid regurgitation is present.   Normal appearing pulmonic valve structure and function.   The left atriumis mildly dilated.   Mild concentric left ventricular hypertrophy is present.   Estimated left ventricular ejection fraction is 60 %.   Normal appearing right atrium.   Normal appearing right ventricle structure and function.     Signature     ----------------------------------------------------------------   Electronically signed by Gladis Willard MD(Interpreting   physician) on 2021 11:52 AM   ----------------------------------------------------------------    Valves    < end of copied text >

## 2021-09-22 NOTE — PROGRESS NOTE ADULT - ASSESSMENT
81 y/o female w/ PMHx of Anxiety, knee osteoarthritis, Hypertension, Hypothyroidism, Morbid obesity, Panic attacks, Diabetes mellitus II, Urine incontinence on  lasix  admitted for:       1. Acute Hypoxic  Respiratory failure 2/2 Acute CHF exacerbation, unknown EF   Clinically likely CHF but BNP is WNL ( possibly false neg in obesity )   LE doppler for acute on chronic edema checked and neg for DVT   Trops neg   CTA to r/o PE neg, + intersitial edema and small  C/w IV lasix 40mg QD  Monitor Is and Os,  check Weight daily   ECHO pending   Cardio eval       2. DM Type II   hold PO meds  Monitor BS, cover with ISS   Diabetic diet         3. HTN   Monitor BP  C/w Nebivilol, Lisinopril, amlodipine for now      4. Hypothyroidism  C/w Synthroid  Check TSK      5. Urinary incontinence  C/w Oxybutynin      6. HLD  C/w Lipitor       7. Anxiety  C/w lexapro     DVT PPX:  Lovenox                            81 y/o female w/ PMHx of Anxiety, knee osteoarthritis, Hypertension, Hypothyroidism, Morbid obesity, Panic attacks, Diabetes mellitus II, Urine incontinence on  lasix  admitted for:       1. Acute Hypoxic  Respiratory failure 2/2 Acute Diastolic CHF exacerbation  Clinically  acute CHF but BNP is WNL (possibly false neg in obesity )   LE doppler for acute on chronic edema checked and neg for DVT   Trops neg   CTA : PE neg, + intersitial edema and small pleural effusions  C/w IV lasix , increase to 40mg BID   Monitor Is and Os,  check Weight daily   ECHO reviewed   C/w tele: episodes of Sinus bradycardia   D/w Dr Lozano       2. DM Type II   hold PO meds  Monitor BS, cover with ISS   Diabetic diet         3. HTN   Monitor BP, borderline low  C/w Nebivilol, Lisinopril, d/c  amlodipine for now      4. Hypothyroidism  C/w Synthroid  Check TSK      5. Urinary incontinence  C/w Oxybutynin      6. HLD  C/w Lipitor       7. Anxiety  C/w lexapro     8. Obesity, suspect LINDA with pulm HTN on echo  will further discuss weight loss and possible bariatric Sx team eval?           9. DVT PPx Lovenox

## 2021-09-23 LAB
ANION GAP SERPL CALC-SCNC: 4 MMOL/L — LOW (ref 5–17)
BUN SERPL-MCNC: 17 MG/DL — SIGNIFICANT CHANGE UP (ref 7–23)
CALCIUM SERPL-MCNC: 9 MG/DL — SIGNIFICANT CHANGE UP (ref 8.5–10.1)
CHLORIDE SERPL-SCNC: 101 MMOL/L — SIGNIFICANT CHANGE UP (ref 96–108)
CO2 SERPL-SCNC: 34 MMOL/L — HIGH (ref 22–31)
CREAT SERPL-MCNC: 0.49 MG/DL — LOW (ref 0.5–1.3)
GLUCOSE SERPL-MCNC: 137 MG/DL — HIGH (ref 70–99)
HCT VFR BLD CALC: 44.1 % — SIGNIFICANT CHANGE UP (ref 34.5–45)
HGB BLD-MCNC: 13.6 G/DL — SIGNIFICANT CHANGE UP (ref 11.5–15.5)
MAGNESIUM SERPL-MCNC: 2.3 MG/DL — SIGNIFICANT CHANGE UP (ref 1.6–2.6)
MCHC RBC-ENTMCNC: 28.3 PG — SIGNIFICANT CHANGE UP (ref 27–34)
MCHC RBC-ENTMCNC: 30.8 GM/DL — LOW (ref 32–36)
MCV RBC AUTO: 91.7 FL — SIGNIFICANT CHANGE UP (ref 80–100)
PLATELET # BLD AUTO: 154 K/UL — SIGNIFICANT CHANGE UP (ref 150–400)
POTASSIUM SERPL-MCNC: 3.5 MMOL/L — SIGNIFICANT CHANGE UP (ref 3.5–5.3)
POTASSIUM SERPL-SCNC: 3.5 MMOL/L — SIGNIFICANT CHANGE UP (ref 3.5–5.3)
RBC # BLD: 4.81 M/UL — SIGNIFICANT CHANGE UP (ref 3.8–5.2)
RBC # FLD: 15.2 % — HIGH (ref 10.3–14.5)
SODIUM SERPL-SCNC: 139 MMOL/L — SIGNIFICANT CHANGE UP (ref 135–145)
WBC # BLD: 7.15 K/UL — SIGNIFICANT CHANGE UP (ref 3.8–10.5)
WBC # FLD AUTO: 7.15 K/UL — SIGNIFICANT CHANGE UP (ref 3.8–10.5)

## 2021-09-23 PROCEDURE — 99232 SBSQ HOSP IP/OBS MODERATE 35: CPT

## 2021-09-23 PROCEDURE — 99233 SBSQ HOSP IP/OBS HIGH 50: CPT

## 2021-09-23 RX ORDER — POTASSIUM CHLORIDE 20 MEQ
40 PACKET (EA) ORAL ONCE
Refills: 0 | Status: COMPLETED | OUTPATIENT
Start: 2021-09-23 | End: 2021-09-23

## 2021-09-23 RX ORDER — FUROSEMIDE 40 MG
40 TABLET ORAL THREE TIMES A DAY
Refills: 0 | Status: DISCONTINUED | OUTPATIENT
Start: 2021-09-23 | End: 2021-09-24

## 2021-09-23 RX ADMIN — ENOXAPARIN SODIUM 40 MILLIGRAM(S): 100 INJECTION SUBCUTANEOUS at 21:38

## 2021-09-23 RX ADMIN — Medication 40 MILLIGRAM(S): at 21:38

## 2021-09-23 RX ADMIN — Medication 40 MILLIGRAM(S): at 09:15

## 2021-09-23 RX ADMIN — Medication 40 MILLIEQUIVALENT(S): at 13:44

## 2021-09-23 RX ADMIN — ENOXAPARIN SODIUM 40 MILLIGRAM(S): 100 INJECTION SUBCUTANEOUS at 09:15

## 2021-09-23 RX ADMIN — LISINOPRIL 40 MILLIGRAM(S): 2.5 TABLET ORAL at 09:15

## 2021-09-23 RX ADMIN — Medication 40 MILLIGRAM(S): at 16:00

## 2021-09-23 RX ADMIN — NEBIVOLOL HYDROCHLORIDE 5 MILLIGRAM(S): 5 TABLET ORAL at 09:14

## 2021-09-23 RX ADMIN — ATORVASTATIN CALCIUM 40 MILLIGRAM(S): 80 TABLET, FILM COATED ORAL at 21:39

## 2021-09-23 RX ADMIN — ESCITALOPRAM OXALATE 20 MILLIGRAM(S): 10 TABLET, FILM COATED ORAL at 09:14

## 2021-09-23 RX ADMIN — Medication 5 MILLIGRAM(S): at 09:14

## 2021-09-23 RX ADMIN — Medication 150 MICROGRAM(S): at 05:35

## 2021-09-23 RX ADMIN — INSULIN GLARGINE 5 UNIT(S): 100 INJECTION, SOLUTION SUBCUTANEOUS at 09:16

## 2021-09-23 RX ADMIN — Medication 325 MILLIGRAM(S): at 09:14

## 2021-09-23 RX ADMIN — INSULIN GLARGINE 5 UNIT(S): 100 INJECTION, SOLUTION SUBCUTANEOUS at 21:39

## 2021-09-23 RX ADMIN — Medication 5 MILLIGRAM(S): at 21:38

## 2021-09-23 NOTE — PROGRESS NOTE ADULT - SUBJECTIVE AND OBJECTIVE BOX
CC: SOB  CHF exac     HPI:  79 y/o female w/ PMHx of Anxiety, knee osteoarthritis, Hypertension, Hypothyroidism, Morbid obesity, Panic attacks, Diabetes mellitus II, Urine incontinence on  lasix  every three days who presents to [place]  [facility] with increasing shortness of breath.   Pt reports worsening  dyspnea with walking from room to room for the last week.   She also has  a dry cough, orthopnea,  chronic leg swelling.  Pt. stated that symptoms are worse at night.    She denies increase in sodium intake but admits to having cheese frequently and Progresso canned soup.      She had reported a post-nasal drip at night with a cough and was treated with an Antibiotic last month.  She said she felt better , however around  she started to have similar symptoms and also tried Mucinex. She c/o feeling tired despite all the recent treatments and reports having a dripping urinary incontinence for which DR. Stevens started her on Toviaz and Myrbetriq     Pt denies chest pain, no palpitations,  no fever, chills, no abd pain, no nausea, vomiting.     INTERVAL HPI/ OVERNIGHT EVENTS:  Pt was seen and examined,  slowly is getting better, still SON on ambulation, no complains. As per RN had 2l UO after second dose of lasix yesterday     Vital Signs Last 24 Hrs  T(C): 36.7 (22 Sep 2021 08:15), Max: 36.9 (21 Sep 2021 23:39)  T(F): 98 (22 Sep 2021 08:15), Max: 98.5 (21 Sep 2021 23:39)  HR: 61 (22 Sep 2021 08:15) (52 - 61)  BP: 137/70 (22 Sep 2021 08:15) (137/70 - 159/65)  RR: 18 (22 Sep 2021 08:15) (16 - 18)  SpO2: 97% (22 Sep 2021 08:15) (92% - 97%)    REVIEW OF SYSTEMS:  All other review of systems is negative unless indicated above.        PHYSICAL EXAM:  General: Well developed; well nourished; in no acute distress  Eyes: PERRLA, EOMI; conjunctiva and sclera clear  Head: Normocephalic; atraumatic  ENMT: No nasal discharge; airway clear  Neck: Supple; non tender; no masses  Respiratory: Diminished BS at bases, mild  rales   Cardiovascular: Regular rate and rhythm. S1 and S2 Normal;   Gastrointestinal: Soft non-tender non-distended; Normal bowel sounds  Genitourinary: No  suprapubic  tenderness  Extremities:  edema improving   Vascular: Peripheral pulses palpable 2+ bilaterally  Neurological: Alert and oriented x3, non focal   Musculoskeletal: Normal muscle tone and strength   Psychiatric: Cooperative and appropriate      LABS:                           13.6   7.15  )-----------( 154      ( 23 Sep 2021 08:09 )             44.1     -23    139  |  101  |  17  ----------------------------<  137<H>  3.5   |  34<H>  |  0.49<L>    Ca    9.0      23 Sep 2021 08:09  Mg     2.3     -                            13.7   7.90  )-----------( 170      ( 22 Sep 2021 07:44 )             44.6     09-22    140  |  104  |  16  ----------------------------<  153<H>  3.5   |  32<H>  |  0.50    Ca    9.1      22 Sep 2021 07:44        CARDIAC MARKERS ( 20 Sep 2021 12:04 )  <0.015 ng/mL / x     / x     / x     / x                                13.6   7.80  )-----------( 178      ( 20 Sep 2021 12:04 )             44.4     21 Sep 2021 07:18    140    |  106    |  14     ----------------------------<  139    3.7     |  32     |  0.53     Ca    9.0        21 Sep 2021 07:18    TPro  7.5    /  Alb  3.2    /  TBili  0.7    /  DBili  x      /  AST  17     /  ALT  22     /  AlkPhos  70     20 Sep 2021 12:0  PT/INR - ( 20 Sep 2021 12:48 )   PT: 12.1 sec;   INR: 1.04 ratio    PTT - ( 20 Sep 2021 12:48 )  PTT:34.2 sec      CAPILLARY BLOOD GLUCOSE  POCT Blood Glucose.: 72 mg/dL (23 Sep 2021 17:09)  POCT Blood Glucose.: 144 mg/dL (23 Sep 2021 12:30)  POCT Blood Glucose.: 149 mg/dL (23 Sep 2021 08:41)  POCT Blood Glucose.: 129 mg/dL (22 Sep 2021 20:47)      LIVER FUNCTIONS - ( 20 Sep 2021 12:04 )  Alb: 3.2 g/dL / Pro: 7.5 gm/dL / ALK PHOS: 70 U/L / ALT: 22 U/L / AST: 17 U/L / GGT: x           Urinalysis Basic - ( 20 Sep 2021 12:06 )  Color: Yellow / Appearance: Clear / S.010 / pH: x  Gluc: x / Ketone: Negative  / Bili: Negative / Urobili: Negative mg/dL   Blood: x / Protein: 30 mg/dL / Nitrite: Negative   Leuk Esterase: Negative / RBC: 3-5 /HPF / WBC 0-2   Sq Epi: x / Non Sq Epi: Negative / Bacteria: Occasional    Culture - Urine (21 @ 12:06)   Specimen Source: Clean Catch Clean Catch (Midstream)   Culture Results:   <10,000 CFU/mL Normal Urogenital Danica     Culture - Blood (21 @ 12:04)   Specimen Source: .Blood Blood-Peripheral   Culture Results:   No growth to date.     MEDICATIONS  (STANDING):  amLODIPine   Tablet 5 milliGRAM(s) Oral daily  aspirin 325 milliGRAM(s) Oral daily  atorvastatin 40 milliGRAM(s) Oral at bedtime  dextrose 40% Gel 15 Gram(s) Oral once  dextrose 5%. 1000 milliLiter(s) (50 mL/Hr) IV Continuous <Continuous>  dextrose 5%. 1000 milliLiter(s) (100 mL/Hr) IV Continuous <Continuous>  dextrose 50% Injectable 25 Gram(s) IV Push once  dextrose 50% Injectable 12.5 Gram(s) IV Push once  dextrose 50% Injectable 25 Gram(s) IV Push once  enoxaparin Injectable 40 milliGRAM(s) SubCutaneous every 12 hours  escitalopram 20 milliGRAM(s) Oral daily  furosemide   Injectable 40 milliGRAM(s) IV Push daily  glucagon  Injectable 1 milliGRAM(s) IntraMuscular once  influenza   Vaccine 0.5 milliLiter(s) IntraMuscular once  insulin glargine Injectable (LANTUS) 5 Unit(s) SubCutaneous every morning  insulin glargine Injectable (LANTUS) 5 Unit(s) SubCutaneous at bedtime  insulin lispro (ADMELOG) corrective regimen sliding scale   SubCutaneous three times a day before meals  insulin lispro (ADMELOG) corrective regimen sliding scale   SubCutaneous at bedtime  levothyroxine 150 MICROGram(s) Oral daily  lisinopril 40 milliGRAM(s) Oral daily  nebivolol 5 milliGRAM(s) Oral daily  oxybutynin 5 milliGRAM(s) Oral two times a day    MEDICATIONS  (PRN):  acetaminophen   Tablet .. 650 milliGRAM(s) Oral every 6 hours PRN Mild Pain (1 - 3)      RADIOLOGY & ADDITIONAL TESTS:          EXAM:  US DPLX LWR EXT VEINS COMPL BI                        PROCEDURE DATE:  2021    FINDINGS:    RIGHT:  Normal compressibility of the RIGHT common femoral, femoral and popliteal veins.  Doppler examination shows normal spontaneous and phasic flow.  No RIGHT calf vein thrombosis is detected.    LEFT:  Normal compressibility of the LEFT common femoral, femoral and popliteal veins.  Doppler examination shows normal spontaneous and phasic flow.  No LEFT calf vein thrombosis is detected.    IMPRESSION:  No evidence of deep venous thrombosis in either lower extremity.              EXAM:  CT ANGIO CHEST PULM ART [facility]                        PROCEDURE DATE:  2021    FINDINGS:    LUNGS AND AIRWAYS: Patent central airways.  Mild pulmonary edema. Bilateral lower lobe compressive atelectasis.  PLEURA: Small to mild bilateral pleural effusions.  MEDIASTINUM AND ARMOND: Several prevascular lymph nodes measuring up to 1.3 cm. Subcentimeter subcarinal and bilateral hilar lymph nodes.  VESSELS: Atherosclerotic changes of the aorta and coronary vasculature. No aortic aneurysm. No evidence of acute pulmonary embolism.  HEART: Heart size is normal. No pericardial effusion.  CHEST WALL AND LOWER NECK: Within normal limits.  VISUALIZED UPPER ABDOMEN: Hepatic steatosis. Gallstones. Calcification along the right hepatic dome. Subcentimeter indeterminate nodularity of the bilateral adrenal glands. Posterior right upper pole renalcyst.  BONES: Degenerative changes.    IMPRESSION:  No evidence of acute pulmonary embolism.  Pulmonary edema with bilateral pleural effusions and bibasilar compressive atelectasis.  Indeterminate subcentimeter bilateral adrenal nodularity.

## 2021-09-23 NOTE — PROGRESS NOTE ADULT - ASSESSMENT
79 y/o female w/ PMHx of Anxiety, knee osteoarthritis, Hypertension, Hypothyroidism, Morbid obesity, Panic attacks, Diabetes mellitus II, Urine incontinence on  lasix  admitted for:       1. Acute Hypoxic  Respiratory failure 2/2 Acute Diastolic CHF exacerbation  Clinically  acute CHF but BNP is WNL (possibly false neg in obesity )   LE doppler for acute on chronic edema checked and neg for DVT   Trops neg   CTA : PE neg, + intersitial edema and small pleural effusions  C/w IV lasix , increase to 40mg TID   Monitor Is and Os,  check Weight daily  -3lbs   ECHO reviewed   C/w tele: episodes of Sinus bradycardia   D/w Dr Montana, will evaluate if need to titrate down BB       2. DM Type II   hold PO meds  Monitor BS, cover with ISS   Diabetic diet         3. HTN   Monitor BP, borderline low  C/w Nebivilol, Lisinopril, d/c  amlodipine for now      4. Hypothyroidism  C/w Synthroid  Check TSK      5. Urinary incontinence  C/w Oxybutynin      6. HLD  C/w Lipitor       7. Anxiety  C/w lexapro     8. Obesity, suspect LINDA with pulm HTN on echo  dietitian eval   will need Pulm eval outPt       9. DVT PPx Lovenox

## 2021-09-23 NOTE — PROGRESS NOTE ADULT - SUBJECTIVE AND OBJECTIVE BOX
Pt is a pleasant 81 y/o female w/ PMHx of Anxiety, knee osteoarthritis, Hypertension, Hypothyroidism, Morbid obesity, Panic attacks, Diabetes mellitus II, Urine incontinence on  lasix  every three days who presents to [place]  [facility] with increasing shortness of breath   Pt reports worsening  dyspnea with walking from room to room for the last week  with cough which is dry gives hx of post nasal drip , seasonal allergies .   She also has  a dry cough, orthopnea,  chronic leg swelling.  Pt states  that symptoms are worse at night.  Patient denies any associated with chest pain or tightness ,  but sob gets worse with exertion ,some times wheeze    She denies increase in sodium intake but admits to having cheese frequently and Progresso canned soup.      She had reported a post-nasal drip at night with a cough and was treated with an Antibiotic last month.  She said she felt better , however around  she started to have similar symptoms and also tried Mucinex. She c/o feeling tired despite all the recent treatments and reports having a dripping urinary incontinence for which DR. Stevens started her on Toviaz and Myrbetriq     Pt denies chest pain, no palpitations,  no fever, chills, no abd pain, no nausea, vomiting Patient blood work showed normal PRO BNP LEVEL  negative troponin  21 Pt denies chest pain. Reports post nasal drip. Bradycardia at times  : improvement in dyspnea w/ diuresis.  Reports orthopnea prior to admit  no worsening of chronic LE edema or weight gain prior to admit.    MEDICATIONS:  OUTPATIENT  Home Medications:  amlodipine-benazepril 10 mg-40 mg oral capsule: 1 cap(s) orally once a day (20 Sep 2021 20:11)  aspirin 325 mg oral tablet: 1 tab(s) orally once a day (20 Sep 2021 20:11)  Bystolic 5 mg oral tablet: 1 tab(s) orally once a day (20 Sep 2021 20:11)  Crestor 40 mg oral tablet: 1 tab(s) orally once a day (20 Sep 2021 20:11)  escitalopram 20 mg oral tablet: 1 tab(s) orally once a day (20 Sep 2021 20:11)  furosemide 40 mg oral tablet: 1 tab(s) orally once a day (20 Sep 2021 20:11)  Januvia 100 mg oral tablet: 1 tab(s) orally once a day (20 Sep 2021 20:11)  levothyroxine 150 mcg (0.15 mg) oral capsule: every other day (3 times a week) (20 Sep 2021 20:11)  levothyroxine 175 mcg (0.175 mg) oral tablet: every other day (4 times a week) (20 Sep 2021 20:11)  nateglinide 120 mg oral tablet: 1 tab(s) orally 3 times a day (before meals) (20 Sep 2021 20:11)  Toviaz 4 mg oral tablet, extended release: 1 tab(s) orally once a day  (20 Sep 2021 20:11)      INPATIENT  MEDICATIONS  (STANDING):  aspirin 325 milliGRAM(s) Oral daily  atorvastatin 40 milliGRAM(s) Oral at bedtime  dextrose 40% Gel 15 Gram(s) Oral once  dextrose 5%. 1000 milliLiter(s) (50 mL/Hr) IV Continuous <Continuous>  dextrose 5%. 1000 milliLiter(s) (100 mL/Hr) IV Continuous <Continuous>  dextrose 50% Injectable 25 Gram(s) IV Push once  dextrose 50% Injectable 12.5 Gram(s) IV Push once  dextrose 50% Injectable 25 Gram(s) IV Push once  enoxaparin Injectable 40 milliGRAM(s) SubCutaneous every 12 hours  escitalopram 20 milliGRAM(s) Oral daily  furosemide   Injectable 40 milliGRAM(s) IV Push three times a day  glucagon  Injectable 1 milliGRAM(s) IntraMuscular once  influenza   Vaccine 0.5 milliLiter(s) IntraMuscular once  insulin glargine Injectable (LANTUS) 5 Unit(s) SubCutaneous every morning  insulin glargine Injectable (LANTUS) 5 Unit(s) SubCutaneous at bedtime  insulin lispro (ADMELOG) corrective regimen sliding scale   SubCutaneous three times a day before meals  insulin lispro (ADMELOG) corrective regimen sliding scale   SubCutaneous at bedtime  levothyroxine 150 MICROGram(s) Oral daily  lisinopril 40 milliGRAM(s) Oral daily  nebivolol 5 milliGRAM(s) Oral daily  oxybutynin 5 milliGRAM(s) Oral two times a day    MEDICATIONS  (PRN):  acetaminophen   Tablet .. 650 milliGRAM(s) Oral every 6 hours PRN Mild Pain (1 - 3)          Vital Signs Last 24 Hrs  T(C): 36.4 (23 Sep 2021 15:58), Max: 36.6 (22 Sep 2021 21:13)  T(F): 97.6 (23 Sep 2021 15:58), Max: 97.9 (22 Sep 2021 21:13)  HR: 50 (23 Sep 2021 15:58) (50 - 57)  BP: 119/47 (23 Sep 2021 15:58) (119/47 - 166/57)  BP(mean): --  RR: 19 (23 Sep 2021 15:58) (19 - 19)  SpO2: 99% (23 Sep 2021 15:58) (93% - 99%)Daily     Daily Weight in k (23 Sep 2021 11:14)I&O's Summary    22 Sep 2021 07:  -  23 Sep 2021 07:00  --------------------------------------------------------  IN: 0 mL / OUT: 0 mL / NET: -0 mL    23 Sep 2021 07:  -  23 Sep 2021 17:51  --------------------------------------------------------  IN: 0 mL / OUT: 800 mL / NET: -800 mL        I&O's Detail    22 Sep 2021 07:01  -  23 Sep 2021 07:00  --------------------------------------------------------  IN:  Total IN: 0 mL    OUT:    Voided (mL): 2050 mL  Total OUT: 0 mL    Total NET: -0 mL      23 Sep 2021 07:  -  23 Sep 2021 17:51  --------------------------------------------------------  IN:  Total IN: 0 mL    OUT:    Voided (mL): 800 mL  Total OUT: 800 mL    Total NET: -800 mL          I&O's Summary    22 Sep 2021 07:  -  23 Sep 2021 07:00  --------------------------------------------------------  IN: 0 mL / OUT: 2050 mL / NET: -0 mL    23 Sep 2021 07:01  -  23 Sep 2021 17:51  --------------------------------------------------------  IN: 0 mL / OUT: 800 mL / NET: -800 mL        PHYSICAL EXAM:    Constitutional: NAD, awake and alert, obese  HEENT: PERR, EOMI,  No oral cyananosis.  Neck:  supple,  No JVD  Respiratory: Breath sounds are clear bilaterally, No wheezing, rales or rhonchi  Cardiovascular: S1 and S2, regular rate and rhythm, no Murmurs, gallops or rubs  Gastrointestinal: Bowel Sounds present, soft, nontender.   Extremities: No peripheral edema. No clubbing or cyanosis.  Vascular: 2+ peripheral pulses  Neurological: A/O x 3, no focal deficits  Musculoskeletal: no calf tenderness.  Skin: No rashes.        LABS: All Labs Reviewed:                        13.6   7.15  )-----------( 154      ( 23 Sep 2021 08:09 )             44.1                         13.7   7.90  )-----------( 170      ( 22 Sep 2021 07:44 )             44.6     23 Sep 2021 08:09    139    |  101    |  17     ----------------------------<  137    3.5     |  34     |  0.49   22 Sep 2021 07:44    140    |  104    |  16     ----------------------------<  153    3.5     |  32     |  0.50   21 Sep 2021 07:18    140    |  106    |  14     ----------------------------<  139    3.7     |  32     |  0.53     Ca    9.0        23 Sep 2021 08:09  Ca    9.1        22 Sep 2021 07:44  Ca    9.0        21 Sep 2021 07:18  Mg     2.3       23 Sep 2021 08:09            Blood Culture: Organism --  Gram Stain Blood -- Gram Stain --  Specimen Source Clean Catch Clean Catch (Midstream)  Culture-Blood --    Organism --  Gram Stain Blood -- Gram Stain --  Specimen Source .Blood Blood-Peripheral  Culture-Blood --       @ 07:44  Pro Bnp 103   @ 07:18  Pro Bnp 165     @ 07:44  TSH: 2.05        ECHO/CARDIAC CATHTERIZATION/STRESS TEST:  < from: TTE Echo Complete w/o Contrast w/ Doppler (21 @ 08:09) >   Impression     Summary     The mitral valve leaflets appear thickened.   Moderate mitral annular calcification is present.   Moderate (2+) mitral regurgitation is present.   Fibrocalcific changes noted to the Aortic valve leaflets with preserved   leaflet excursion.   Mild to Moderate Tricuspid regurgitation is present.   Normal appearing pulmonic valve structure and function.   The left atriumis mildly dilated.   Mild concentric left ventricular hypertrophy is present.   Estimated left ventricular ejection fraction is 60 %.   Normal appearing right atrium.   Normal appearing right ventricle structure and function.     Signature     ----------------------------------------------------------------   Electronically signed by Gladis Willard MD(Interpreting   physician) on 2021 11:52 AM   ----------------------------------------------------------------      ==============================    Gwyn Montana M.D.  Cardiology, [facility]  Cell: [phone]  Offices:    ([facility] Office)  [phone] ([facility] Office)

## 2021-09-24 DIAGNOSIS — J90 PLEURAL EFFUSION, NOT ELSEWHERE CLASSIFIED: ICD-10-CM

## 2021-09-24 DIAGNOSIS — J81.0 ACUTE PULMONARY EDEMA: ICD-10-CM

## 2021-09-24 LAB
ANION GAP SERPL CALC-SCNC: 3 MMOL/L — LOW (ref 5–17)
BASE EXCESS BLDA CALC-SCNC: 9.7 MMOL/L — HIGH (ref -2–3)
BLOOD GAS COMMENTS ARTERIAL: SIGNIFICANT CHANGE UP
BUN SERPL-MCNC: 14 MG/DL — SIGNIFICANT CHANGE UP (ref 7–23)
CALCIUM SERPL-MCNC: 9 MG/DL — SIGNIFICANT CHANGE UP (ref 8.5–10.1)
CHLORIDE SERPL-SCNC: 102 MMOL/L — SIGNIFICANT CHANGE UP (ref 96–108)
CO2 BLDA-SCNC: 38 MMOL/L — HIGH (ref 19–24)
CO2 SERPL-SCNC: 34 MMOL/L — HIGH (ref 22–31)
CREAT SERPL-MCNC: 0.5 MG/DL — SIGNIFICANT CHANGE UP (ref 0.5–1.3)
GLUCOSE SERPL-MCNC: 156 MG/DL — HIGH (ref 70–99)
HCO3 BLDA-SCNC: 36 MMOL/L — HIGH (ref 21–28)
HCT VFR BLD CALC: 44.8 % — SIGNIFICANT CHANGE UP (ref 34.5–45)
HGB BLD-MCNC: 13.7 G/DL — SIGNIFICANT CHANGE UP (ref 11.5–15.5)
MAGNESIUM SERPL-MCNC: 2 MG/DL — SIGNIFICANT CHANGE UP (ref 1.6–2.6)
MCHC RBC-ENTMCNC: 28.2 PG — SIGNIFICANT CHANGE UP (ref 27–34)
MCHC RBC-ENTMCNC: 30.6 GM/DL — LOW (ref 32–36)
MCV RBC AUTO: 92.2 FL — SIGNIFICANT CHANGE UP (ref 80–100)
PCO2 BLDA: 56 MMHG — HIGH (ref 32–35)
PH BLDA: 7.42 — SIGNIFICANT CHANGE UP (ref 7.35–7.45)
PHOSPHATE SERPL-MCNC: 3 MG/DL — SIGNIFICANT CHANGE UP (ref 2.5–4.5)
PLATELET # BLD AUTO: 162 K/UL — SIGNIFICANT CHANGE UP (ref 150–400)
PO2 BLDA: 69 MMHG — LOW (ref 83–108)
POTASSIUM SERPL-MCNC: 3.4 MMOL/L — LOW (ref 3.5–5.3)
POTASSIUM SERPL-SCNC: 3.4 MMOL/L — LOW (ref 3.5–5.3)
RBC # BLD: 4.86 M/UL — SIGNIFICANT CHANGE UP (ref 3.8–5.2)
RBC # FLD: 15.4 % — HIGH (ref 10.3–14.5)
SAO2 % BLDA: 94 % — SIGNIFICANT CHANGE UP
SODIUM SERPL-SCNC: 139 MMOL/L — SIGNIFICANT CHANGE UP (ref 135–145)
WBC # BLD: 7.07 K/UL — SIGNIFICANT CHANGE UP (ref 3.8–10.5)
WBC # FLD AUTO: 7.07 K/UL — SIGNIFICANT CHANGE UP (ref 3.8–10.5)

## 2021-09-24 PROCEDURE — 99232 SBSQ HOSP IP/OBS MODERATE 35: CPT

## 2021-09-24 PROCEDURE — 71046 X-RAY EXAM CHEST 2 VIEWS: CPT | Mod: 26

## 2021-09-24 PROCEDURE — 99223 1ST HOSP IP/OBS HIGH 75: CPT

## 2021-09-24 PROCEDURE — 99233 SBSQ HOSP IP/OBS HIGH 50: CPT

## 2021-09-24 RX ORDER — SODIUM CHLORIDE 0.65 %
1 AEROSOL, SPRAY (ML) NASAL
Refills: 0 | Status: DISCONTINUED | OUTPATIENT
Start: 2021-09-24 | End: 2021-09-25

## 2021-09-24 RX ORDER — POTASSIUM CHLORIDE 20 MEQ
40 PACKET (EA) ORAL EVERY 4 HOURS
Refills: 0 | Status: COMPLETED | OUTPATIENT
Start: 2021-09-24 | End: 2021-09-24

## 2021-09-24 RX ORDER — FUROSEMIDE 40 MG
40 TABLET ORAL EVERY 12 HOURS
Refills: 0 | Status: DISCONTINUED | OUTPATIENT
Start: 2021-09-24 | End: 2021-09-25

## 2021-09-24 RX ADMIN — INSULIN GLARGINE 5 UNIT(S): 100 INJECTION, SOLUTION SUBCUTANEOUS at 08:16

## 2021-09-24 RX ADMIN — Medication 40 MILLIGRAM(S): at 17:07

## 2021-09-24 RX ADMIN — Medication 1: at 08:16

## 2021-09-24 RX ADMIN — INSULIN GLARGINE 5 UNIT(S): 100 INJECTION, SOLUTION SUBCUTANEOUS at 21:42

## 2021-09-24 RX ADMIN — Medication 40 MILLIEQUIVALENT(S): at 14:13

## 2021-09-24 RX ADMIN — ATORVASTATIN CALCIUM 40 MILLIGRAM(S): 80 TABLET, FILM COATED ORAL at 21:42

## 2021-09-24 RX ADMIN — ENOXAPARIN SODIUM 40 MILLIGRAM(S): 100 INJECTION SUBCUTANEOUS at 09:32

## 2021-09-24 RX ADMIN — Medication 5 MILLIGRAM(S): at 21:42

## 2021-09-24 RX ADMIN — Medication 325 MILLIGRAM(S): at 09:32

## 2021-09-24 RX ADMIN — LISINOPRIL 40 MILLIGRAM(S): 2.5 TABLET ORAL at 09:32

## 2021-09-24 RX ADMIN — Medication 40 MILLIEQUIVALENT(S): at 10:42

## 2021-09-24 RX ADMIN — ESCITALOPRAM OXALATE 20 MILLIGRAM(S): 10 TABLET, FILM COATED ORAL at 09:32

## 2021-09-24 RX ADMIN — Medication 5 MILLIGRAM(S): at 09:32

## 2021-09-24 RX ADMIN — Medication 150 MICROGRAM(S): at 06:47

## 2021-09-24 RX ADMIN — Medication 40 MILLIGRAM(S): at 06:42

## 2021-09-24 RX ADMIN — Medication 40 MILLIGRAM(S): at 14:13

## 2021-09-24 RX ADMIN — ENOXAPARIN SODIUM 40 MILLIGRAM(S): 100 INJECTION SUBCUTANEOUS at 21:42

## 2021-09-24 NOTE — PROGRESS NOTE ADULT - SUBJECTIVE AND OBJECTIVE BOX
CC: SOB  CHF exac     Pt is a 79 y/o female w/ PMHx of Anxiety, knee osteoarthritis, Hypertension, Hypothyroidism, Morbid obesity, Panic attacks, Diabetes mellitus II, Urine incontinence on  lasix  every three days who presents to [place]  ED with increasing shortness of breath. Pt reports worsening  dyspnea with walking from room to room for the last week.   She also has  a dry cough, orthopnea,  chronic leg swelling.  Pt. stated that symptoms are worse at night.    She denies increase in sodium intake but admits to having cheese frequently and Progresso canned soup.      She had reported a post-nasal drip at night with a cough and was treated with an Antibiotic last month.  She said she felt better , however around Sept 10th she started to have similar symptoms and also tried Mucinex. She c/o feeling tired despite all the recent treatments and reports having a dripping urinary incontinence for which DR. Stevens started her on Toviaz and Myrbetriq       Medical progress: Patient denies any HA, CP, SOB. No fevers, chills or shakes. Patient notes of walking without O2 to the bathroom. Labs and vitals reviewed.   Complaints: no new complaints  State of mind: maintains normal conversation  Plan: respiratory eval /  ABG baseline /  will need outpatient pulmonary follow up        INTERVAL HPI/ OVERNIGHT EVENTS:    - Pt was seen and examined,  slowly is getting better, still SON on ambulation, no complains. As per RN had 2l UO after second dose of lasix yesterday     REVIEW OF SYSTEMS:  All other review of systems is negative unless indicated above.    PHYSICAL EXAM:  General: Well developed; well nourished; in no acute distress  Eyes: PERRLA, EOMI; conjunctiva and sclera clear  Head: Normocephalic; atraumatic  ENMT: No nasal discharge; airway clear  Neck: Supple; non tender; no masses  Respiratory: Diminished BS at bases, mild  rales   Cardiovascular: Regular rate and rhythm. S1 and S2 Normal;   Gastrointestinal: Soft non-tender non-distended; Normal bowel sounds  Genitourinary: No  suprapubic  tenderness  Extremities:  edema improving   Vascular: Peripheral pulses palpable 2+ bilaterally  Neurological: Alert and oriented x3, non focal   Musculoskeletal: Normal muscle tone and strength   Psychiatric: Cooperative and appropriate      LABS:   CBC Full  -  ( 24 Sep 2021 08:14 )  WBC Count : 7.07 K/uL  RBC Count : 4.86 M/uL  Hemoglobin : 13.7 g/dL  Hematocrit : 44.8 %  Platelet Count - Automated : 162 K/uL    139  |  102  |  14  ----------------------------<  156<H>  3.4<L>   |  34<H>  |  0.50    Ca    9.0      24 Sep 2021 08:14  Phos  3.0     09-24  Mg     2.0     09-24      79 y/o female w/ PMHx of Anxiety, knee osteoarthritis, Hypertension, Hypothyroidism, Morbid obesity, Panic attacks, Diabetes mellitus II, Urine incontinence on  lasix  admitted for:       # Acute Hypoxic  Respiratory failure 2/2 Acute Diastolic CHF exacerbation  - pending respiratory therapy eval  - pending ABG  - LE doppler for acute on chronic edema checked and neg for DVT   - CTA : PE neg, + intersitial edema and small pleural effusions  - C/w IV lasix , increase to 40mg TID   - Monitor Is and Os  - Check Weight daily - 3lbs  - C/w tele: episodes of Sinus bradycardia     # DM Type II   - hold PO meds  - Monitor BS, cover with ISS   - Diabetic diet     # HTN   - Monitor BP, borderline low  - C/w Nebivilol, Lisinopril, d/c  amlodipine for now    # Hypothyroidism  - C/w Synthroid  - Check TSK    # Urinary incontinence  - C/w Oxybutynin    # HLD  - C/w Lipitor     # Anxiety  - C/w lexapro     # Obesity, suspect LINDA with pulm HTN on echo  - dietitian eval   - will need Pulm eval outPt

## 2021-09-24 NOTE — CONSULT NOTE ADULT - SUBJECTIVE AND OBJECTIVE BOX
HPI:  Pt is a pleasant 79 y/o female w/ PMHx of Anxiety, knee osteoarthritis, Hypertension, Hypothyroidism, Morbid obesity, Panic attacks, Diabetes mellitus II, Urine incontinence on  lasix  every three days who presents to [place]  [facility] with increasing shortness of breath.   Pt reports worsening  dyspnea with walking from room to room for the last week.   She also has  a dry cough, orthopnea,  chronic leg swelling.  Pt. stated that symptoms are worse at night.    She denies increase in sodium intake but admits to having cheese frequently and Progresso canned soup.      She had reported a post-nasal drip at night with a cough and was treated with an Antibiotic last month.  She said she felt better , however around Sept 10th she started to have similar symptoms and also tried Mucinex. She c/o feeling tired despite all the recent treatments and reports having a dripping urinary incontinence for which DR. Stevens started her on Toviaz and Myrbetriq     Pt denies chest pain, no palpitations,  no fever, chills, no abd pain, no nausea, vomiting. (20 Sep 2021 16:51)      History as above. Patient feeling less short of breath today. CT angiogram of the chest was reviewed. No evidence of pulmonary embolism. Patient does have bilateral pulmonary edema with pulmonary vascular congestion and small, bilateral pleural effusions. Echocardiogram shows normal left ventricular ejection fraction and no significant pulmonary hypertension. B. and P. level was normal.      PAST MEDICAL & SURGICAL HISTORY:  Anxiety    Type 2 diabetes mellitus    Hypothyroid    Hypertension    History of osteoarthritis  knees    Morbid obesity    Panic attacks    Ankle swelling  b/l    Urine incontinence    Status post dilation and curettage    Elective surgery  insertion of pessary later removed    S/P T&amp;A (status post tonsillectomy and adenoidectomy)    S/P cataract surgery  b/l    S/P ORIF (open reduction internal fixation) fracture  left ankle    H/O colonoscopy    History of esophagogastroduodenoscopy (EGD)        MEDICATIONS  (STANDING):  aspirin 325 milliGRAM(s) Oral daily  atorvastatin 40 milliGRAM(s) Oral at bedtime  dextrose 40% Gel 15 Gram(s) Oral once  dextrose 5%. 1000 milliLiter(s) (50 mL/Hr) IV Continuous <Continuous>  dextrose 5%. 1000 milliLiter(s) (100 mL/Hr) IV Continuous <Continuous>  dextrose 50% Injectable 25 Gram(s) IV Push once  dextrose 50% Injectable 12.5 Gram(s) IV Push once  dextrose 50% Injectable 25 Gram(s) IV Push once  enoxaparin Injectable 40 milliGRAM(s) SubCutaneous every 12 hours  escitalopram 20 milliGRAM(s) Oral daily  furosemide   Injectable 40 milliGRAM(s) IV Push three times a day  glucagon  Injectable 1 milliGRAM(s) IntraMuscular once  influenza   Vaccine 0.5 milliLiter(s) IntraMuscular once  insulin glargine Injectable (LANTUS) 5 Unit(s) SubCutaneous every morning  insulin glargine Injectable (LANTUS) 5 Unit(s) SubCutaneous at bedtime  insulin lispro (ADMELOG) corrective regimen sliding scale   SubCutaneous three times a day before meals  insulin lispro (ADMELOG) corrective regimen sliding scale   SubCutaneous at bedtime  levothyroxine 150 MICROGram(s) Oral daily  lisinopril 40 milliGRAM(s) Oral daily  nebivolol 5 milliGRAM(s) Oral daily  oxybutynin 5 milliGRAM(s) Oral two times a day  potassium chloride    Tablet ER 40 milliEquivalent(s) Oral every 4 hours    MEDICATIONS  (PRN):  acetaminophen   Tablet .. 650 milliGRAM(s) Oral every 6 hours PRN Mild Pain (1 - 3)  sodium chloride 0.65% Nasal 1 Spray(s) Both Nostrils two times a day PRN Nasal Congestion      Allergies    Allergy Status Unknown    Intolerances    epinephrine (Other)      SOCIAL HISTORY: Denies tobacco, etoh abuse or illicit drug use    FAMILY HISTORY:  FHx: obesity  Pt is adopted . No other history        Vital Signs Last 24 Hrs  T(C): 37.1 (24 Sep 2021 08:02), Max: 37.5 (24 Sep 2021 06:37)  T(F): 98.7 (24 Sep 2021 08:02), Max: 99.5 (24 Sep 2021 06:37)  HR: 50 (24 Sep 2021 08:02) (50 - 54)  BP: 149/63 (24 Sep 2021 08:02) (119/47 - 154/63)  BP(mean): --  RR: 18 (24 Sep 2021 08:02) (18 - 19)  SpO2: 96% (24 Sep 2021 08:02) (96% - 99%)    REVIEW OF SYSTEMS:    CONSTITUTIONAL:  As per HPI.  SKIN: no rashes  HEENT:  Eyes:  No diplopia or blurred vision. ENT:  No earache, sore throat or runny nose.  CARDIOVASCULAR:  No pressure, squeezing, tightness, heaviness or aching about the chest, neck, axilla or epigastrium.  RESPIRATORY:  No cough, shortness of breath, PND or orthopnea.  GASTROINTESTINAL:  No nausea, vomiting or diarrhea.  GENITOURINARY:  No dysuria, frequency or urgency.  MUSCULOSKELETAL:  As per HPI.  SKIN:  No change in skin, hair or nails.  NEUROLOGIC:  No paresthesias, fasciculations, seizures or weakness.  PSYCHIATRIC:  No disorder of thought or mood.  ENDOCRINE:  No heat or cold intolerance, polyuria or polydipsia.  HEMATOLOGICAL:  No easy bruising or bleedings:  .     PHYSICAL EXAMINATION:    GENERAL APPEARANCE:  Pt. is not currently dyspneic, in no distress. Pt. is alert, oriented, and pleasant.  HEENT:  Pupils are normal and react normally. No icterus. Mucous membranes well colored.  NECK:  Supple. No lymphadenopathy. Jugular venous pressure not elevated. Carotids equal.   HEART:   regular S1 and S2  CHEST:  decreased breath sounds bases with inspiratory crackles  ABDOMEN:  Soft and nontender.   EXTREMITIES:  There is no cyanosis, clubbing or edema.   SKIN:  No rash or significant lesions are noted.    LABS:                        13.7   7.07  )-----------( 162      ( 24 Sep 2021 08:14 )             44.8     09-24    139  |  102  |  14  ----------------------------<  156<H>  3.4<L>   |  34<H>  |  0.50    Ca    9.0      24 Sep 2021 08:14  Phos  3.0     09-24  Mg     2.0     09-24                    RADIOLOGY & ADDITIONAL STUDIES:

## 2021-09-24 NOTE — CONSULT NOTE ADULT - ASSESSMENT
Ms. Lara has a history of progressive shortness of breath over the past several weeks.  CT scan of the chest shows pulmonary edema with bilateral pleural effusions consistent with pulmonary edema.  Echocardiogram shows normal LV function with no evidence of pulmonary hypertension and BNP level is normal.  Would continue with diuresis.  Patient should have a polysomnography examination as an outpatient.  DVT prophylaxis
80 year-old woman, presents for shortness of breath, CT shows ground glass changes, with bilteral effusions, this is strongly suggestive of CHF and pulmonary edema.   --She has significantly improved since admission.      Recommendations:  --c/w diuresis as per cardiology  --Out of bed to chair, ambulation as tolerated  --Wean oxygen as tolerated    Notify our service with any questions or concerns

## 2021-09-24 NOTE — PHARMACOTHERAPY INTERVENTION NOTE - COMMENTS
Medications have been reviewed and verified based on a medication list in the patient's chart and in correspondence with Dr. Hoffman medication history profile.
Recommended k repletion
Recommended adding hold parameter to bystolic

## 2021-09-24 NOTE — PHYSICAL THERAPY INITIAL EVALUATION ADULT - MODALITIES TREATMENT COMMENTS
pt left seated @ b/s post Eval; O2 2L/min nc, bladder suction, HM in place; callbell in reach; pt instructed to have nursing S with ambulation; osbaldo well; denied pain

## 2021-09-24 NOTE — PROGRESS NOTE ADULT - SUBJECTIVE AND OBJECTIVE BOX
PCP:    REQUESTING PHYSICIAN:    REASON FOR CONSULT:    CHIEF COMPLAINT:    HPI:  Pt is a pleasant 79 y/o female w/ PMHx of Anxiety, knee osteoarthritis, Hypertension, Hypothyroidism, Morbid obesity, Panic attacks, Diabetes mellitus II, Urine incontinence on  lasix  every three days who presents to [place]  [facility] with increasing shortness of breath   Pt reports worsening  dyspnea with walking from room to room for the last week  with cough which is dry gives hx of post nasal drip , seasonal allergies .   She also has  a dry cough, orthopnea,  chronic leg swelling.  Pt states  that symptoms are worse at night.  Patient denies any associated with chest pain or tightness ,  but sob gets worse with exertion ,some times wheeze    She denies increase in sodium intake but admits to having cheese frequently and Progresso canned soup.      She had reported a post-nasal drip at night with a cough and was treated with an Antibiotic last month.  She said she felt better , however around  she started to have similar symptoms and also tried Mucinex. She c/o feeling tired despite all the recent treatments and reports having a dripping urinary incontinence for which DR. Stevens started her on Toviaz and Myrbetriq     Pt denies chest pain, no palpitations,  no fever, chills, no abd pain, no nausea, vomiting Patient blood work showed normal PRO BNP LEVEL  negative troponin  21 Pt denies chest pain. Reports post nasal drip. Bradycardia at times  : improvement in dyspnea w/ diuresis.  Reports orthopnea prior to admit  no worsening of chronic LE edema or weight gain prior to admit.  21 Feels improved. Discussed with Pulmonary        PAST MEDICAL & SURGICAL HISTORY:  Anxiety    Type 2 diabetes mellitus    Hypothyroid    Hypertension    History of osteoarthritis  knees    Morbid obesity    Panic attacks    Ankle swelling  b/l    Urine incontinence    Status post dilation and curettage    Elective surgery  insertion of pessary later removed    S/P T&amp;A (status post tonsillectomy and adenoidectomy)    S/P cataract surgery  b/l    S/P ORIF (open reduction internal fixation) fracture  left ankle    H/O colonoscopy    History of esophagogastroduodenoscopy (EGD)        SOCIAL HISTORY:    FAMILY HISTORY:  FHx: obesity  Pt is adopted . No other history        ALLERGIES:  Allergies    Allergy Status Unknown    Intolerances    epinephrine (Other)      MEDICATIONS:    MEDICATIONS  (STANDING):  aspirin 325 milliGRAM(s) Oral daily  atorvastatin 40 milliGRAM(s) Oral at bedtime  dextrose 40% Gel 15 Gram(s) Oral once  dextrose 5%. 1000 milliLiter(s) (50 mL/Hr) IV Continuous <Continuous>  dextrose 5%. 1000 milliLiter(s) (100 mL/Hr) IV Continuous <Continuous>  dextrose 50% Injectable 25 Gram(s) IV Push once  dextrose 50% Injectable 12.5 Gram(s) IV Push once  dextrose 50% Injectable 25 Gram(s) IV Push once  enoxaparin Injectable 40 milliGRAM(s) SubCutaneous every 12 hours  escitalopram 20 milliGRAM(s) Oral daily  furosemide   Injectable 40 milliGRAM(s) IV Push three times a day  glucagon  Injectable 1 milliGRAM(s) IntraMuscular once  influenza   Vaccine 0.5 milliLiter(s) IntraMuscular once  insulin glargine Injectable (LANTUS) 5 Unit(s) SubCutaneous every morning  insulin glargine Injectable (LANTUS) 5 Unit(s) SubCutaneous at bedtime  insulin lispro (ADMELOG) corrective regimen sliding scale   SubCutaneous three times a day before meals  insulin lispro (ADMELOG) corrective regimen sliding scale   SubCutaneous at bedtime  levothyroxine 150 MICROGram(s) Oral daily  lisinopril 40 milliGRAM(s) Oral daily  nebivolol 5 milliGRAM(s) Oral daily  oxybutynin 5 milliGRAM(s) Oral two times a day  potassium chloride    Tablet ER 40 milliEquivalent(s) Oral every 4 hours    MEDICATIONS  (PRN):  acetaminophen   Tablet .. 650 milliGRAM(s) Oral every 6 hours PRN Mild Pain (1 - 3)  sodium chloride 0.65% Nasal 1 Spray(s) Both Nostrils two times a day PRN Nasal Congestion        Vital Signs Last 24 Hrs  T(C): 37.1 (24 Sep 2021 08:02), Max: 37.5 (24 Sep 2021 06:37)  T(F): 98.7 (24 Sep 2021 08:02), Max: 99.5 (24 Sep 2021 06:37)  HR: 50 (24 Sep 2021 08:02) (50 - 54)  BP: 149/63 (24 Sep 2021 08:02) (119/47 - 154/63)  BP(mean): --  RR: 18 (24 Sep 2021 08:02) (18 - 19)  SpO2: 96% (24 Sep 2021 08:02) (96% - 99%)Daily     Daily Weight in k.2 (24 Sep 2021 06:40)I&O's Summary    23 Sep 2021 07:01  -  24 Sep 2021 07:00  --------------------------------------------------------  IN: 0 mL / OUT: 1350 mL / NET: -1350 mL        PHYSICAL EXAM:    Constitutional: NAD, awake and alert, well-developed  HEENT: PERR, EOMI,  No oral cyananosis.  Neck:  supple,  No JVD  Respiratory: Breath sounds are clear bilaterally, No wheezing, rales or rhonchi  Cardiovascular: S1 and S2, regular rate and rhythm, no Murmurs, gallops or rubs  Gastrointestinal: Bowel Sounds present, soft, nontender.   Extremities: Edema  Vascular: 2+ peripheral pulses  Neurological: A/O x 3, no focal deficits  Musculoskeletal: no calf tenderness.  Skin: No rashes.      LABS: All Labs Reviewed:                        13.7   7.07  )-----------( 162      ( 24 Sep 2021 08:14 )             44.8                         13.6   7.15  )-----------( 154      ( 23 Sep 2021 08:09 )             44.1                         13.7   7.90  )-----------( 170      ( 22 Sep 2021 07:44 )             44.6     24 Sep 2021 08:14    139    |  102    |  14     ----------------------------<  156    3.4     |  34     |  0.50   23 Sep 2021 08:09    139    |  101    |  17     ----------------------------<  137    3.5     |  34     |  0.49   22 Sep 2021 07:44    140    |  104    |  16     ----------------------------<  153    3.5     |  32     |  0.50     Ca    9.0        24 Sep 2021 08:14  Ca    9.0        23 Sep 2021 08:09  Ca    9.1        22 Sep 2021 07:44  Phos  3.0       24 Sep 2021 08:14  Mg     2.0       24 Sep 2021 08:14  Mg     2.3       23 Sep 2021 08:09            Blood Culture: Organism --  Gram Stain Blood -- Gram Stain --  Specimen Source Clean Catch Clean Catch (Midstream)  Culture-Blood --    Organism --  Gram Stain Blood -- Gram Stain --  Specimen Source .Blood Blood-Peripheral  Culture-Blood --       @ 07:44  Pro Bnp 103     @ 07:44  TSH: 2.05      RADIOLOGY/EKG:      ECHO/CARDIAC CATHTERIZATION/STRESS TEST:< from: TTE Echo Complete w/o Contrast w/ Doppler (21 @ 08:09) >  Summary     The mitral valve leaflets appear thickened.   Moderate mitral annular calcification is present.   Moderate (2+) mitral regurgitation is present.   Fibrocalcific changes noted to the Aortic valve leaflets with preserved   leaflet excursion.   Mild to Moderate Tricuspid regurgitation is present.   Normal appearing pulmonic valve structure and function.   The left atriumis mildly dilated.   Mild concentric left ventricular hypertrophy is present.   Estimated left ventricular ejection fraction is 60 %.   Normal appearing right atrium.   Normal appearing right ventricle structure and function.     Signature     ----------------------------------------------------------------   Electronically signed by Gladis Willard MD(Interpreting   physician) on 2021 11:52 AM   ----------------------------------------------------------------    < end of copied text >

## 2021-09-24 NOTE — PHYSICAL THERAPY INITIAL EVALUATION ADULT - CRITERIA FOR SKILLED THERAPEUTIC INTERVENTIONS
pt does not require further skilled PT intervention @ this time; recommend daily OOB / amb as osbaldo on nursing floor care

## 2021-09-24 NOTE — CONSULT NOTE ADULT - SUBJECTIVE AND OBJECTIVE BOX
HISTORY OF PRESENT ILLNESS:   80 year-old woman, presents to [facility]  [facility] with increasing shortness of breath.   She reports worsening  dyspnea with exertion when walking around the house. She also has  a dry cough, orthopnea,  chronic leg swelling.  She was admitted managed with diuresis hear failure      REVIEW OF SYSTEMS: otherwise negative.         PAST MEDICAL & PAST SURGICAL HISTORY:  ·	Anxiety  ·	Type 2 diabetes mellitus  ·	Hypothyroid  ·	Hypertension  ·	History of osteoarthritisknees  ·	Morbid obesity  ·	Panic attacks  ·	Ankle swellingb/l  ·	Urine incontinence  ·	Status post dilation and curettage  ·	Elective surgeryinsertion of pessary later removed  ·	S/P T&amp;A (status post tonsillectomy and adenoidectomy)  ·	S/P cataract surgeryb/l  ·	S/P ORIF (open reduction internal fixation) fractureleft ankle  ·	H/O colonoscopy  ·	History of esophagogastroduodenoscopy (EGD)        SOCIAL HISTORY:       FAMILY HISTORY:  ·	Pt is adopted     MEDICATIONS  (STANDING):  aspirin 325 milliGRAM(s) Oral daily  atorvastatin 40 milliGRAM(s) Oral at bedtime  dextrose 40% Gel 15 Gram(s) Oral once  dextrose 5%. 1000 milliLiter(s) (50 mL/Hr) IV Continuous <Continuous>  dextrose 5%. 1000 milliLiter(s) (100 mL/Hr) IV Continuous <Continuous>  dextrose 50% Injectable 25 Gram(s) IV Push once  dextrose 50% Injectable 12.5 Gram(s) IV Push once  dextrose 50% Injectable 25 Gram(s) IV Push once  enoxaparin Injectable 40 milliGRAM(s) SubCutaneous every 12 hours  escitalopram 20 milliGRAM(s) Oral daily  furosemide   Injectable 40 milliGRAM(s) IV Push three times a day  glucagon  Injectable 1 milliGRAM(s) IntraMuscular once  influenza   Vaccine 0.5 milliLiter(s) IntraMuscular once  insulin glargine Injectable (LANTUS) 5 Unit(s) SubCutaneous every morning  insulin glargine Injectable (LANTUS) 5 Unit(s) SubCutaneous at bedtime  insulin lispro (ADMELOG) corrective regimen sliding scale   SubCutaneous three times a day before meals  insulin lispro (ADMELOG) corrective regimen sliding scale   SubCutaneous at bedtime  levothyroxine 150 MICROGram(s) Oral daily  lisinopril 40 milliGRAM(s) Oral daily  nebivolol 5 milliGRAM(s) Oral daily  oxybutynin 5 milliGRAM(s) Oral two times a day  potassium chloride    Tablet ER 40 milliEquivalent(s) Oral every 4 hours    MEDICATIONS  (PRN):  acetaminophen   Tablet .. 650 milliGRAM(s) Oral every 6 hours PRN Mild Pain (1 - 3)  sodium chloride 0.65% Nasal 1 Spray(s) Both Nostrils two times a day PRN Nasal Congestion      Vital Signs Last 24 Hrs  T(C): 37.1 (24 Sep 2021 08:02), Max: 37.5 (24 Sep 2021 06:37)  T(F): 98.7 (24 Sep 2021 08:02), Max: 99.5 (24 Sep 2021 06:37)  HR: 50 (24 Sep 2021 08:02) (50 - 54)  BP: 149/63 (24 Sep 2021 08:02) (119/47 - 154/63)  BP(mean): --  RR: 18 (24 Sep 2021 08:02) (18 - 19)  SpO2: 96% (24 Sep 2021 08:02) (96% - 99%)      PHYSICAL EXAMINATION:  GENERAL APPEARANCE:  No distress. No accessory muscle use. Well appearing.   HEAD: Normocephalic atraumatic   EYES: Pupils are normal. No icterus. Sclera white.   HEENT:  Mucus membranes moist. Oropharynx normal.   NECK:  Supple. No cervical or supraclavicular lymphadenopathy appreciated. No stridor.  HEART:  Normal S1 and S2. There are no murmurs, rubs or gallops noted  CHEST:  Clear to ausculation bilaterally. Normal chest excursion. No wheezing. No crackles. No rhonchi   ABDOMEN:  Soft and nontender. Nondistended.   EXTREMITIES:  There is no cyanosis, clubbing or edema.   SKIN:  No rash or significant lesions are noted. No jaundice.  PSYCH: Normal affect.  NEURO: Alert and oriented. Responds to question appropriate. No focal deficits appreciated.         LABS:                        13.7   7.07  )-----------( 162      ( 24 Sep 2021 08:14 )             44.8     09-24    139  |  102  |  14  ----------------------------<  156<H>  3.4<L>   |  34<H>  |  0.50    Ca    9.0      24 Sep 2021 08:14  Phos  3.0     09-24  Mg     2.0     09-24        RADIOLOGY & ADDITIONAL STUDIES:   *Imaging personally reviewed     EXAM: CT ANGIO CHEST PULM ART [facility]  PROCEDURE DATE: 09/21/2021  LUNGS AND AIRWAYS: Patent central airways. Mild pulmonary edema. Bilateral lower lobe compressive atelectasis.  PLEURA: Small to mild bilateral pleural effusions.  MEDIASTINUM AND ARMOND: Several prevascular lymph nodes measuring up to 1.3 cm. Subcentimeter subcarinal and bilateral hilar lymph nodes.  VESSELS: Atherosclerotic changes of the aorta and coronary vasculature. No aortic aneurysm. No evidence of acute pulmonary embolism.  HEART: Heart size is normal. No pericardial effusion.  CHEST WALL AND LOWER NECK: Within normal limits.  VISUALIZED UPPER ABDOMEN: Hepatic steatosis. Gallstones. Calcification along the right hepatic dome. Subcentimeter indeterminate nodularity of the bilateral adrenal glands. Posterior right upper pole renal cyst.  BONES: Degenerative changes.    IMPRESSION:  No evidence of acute pulmonary embolism.  Pulmonary edema with bilateral pleural effusions and bibasilar compressive atelectasis.  Indeterminate subcentimeter bilateral adrenal nodularity.       HISTORY OF PRESENT ILLNESS:   80 year-old woman, presents to [facility]  [facility] with increasing shortness of breath.   She reports worsening  dyspnea with exertion when walking around the house. She also has  a dry cough, orthopnea,  chronic leg swelling.  She was admitted managed with diuresis hear failure. She notes significant improvement since admission, almost back to baseline. She has no wheezing. No chest pain.       REVIEW OF SYSTEMS: otherwise negative.         PAST MEDICAL & PAST SURGICAL HISTORY:  ·	Anxiety  ·	Type 2 diabetes mellitus  ·	Hypothyroid  ·	Hypertension  ·	History of osteoarthritisknees  ·	Morbid obesity  ·	Panic attacks  ·	Ankle swellingb/l  ·	Urine incontinence  ·	Status post dilation and curettage  ·	Elective surgeryinsertion of pessary later removed  ·	S/P T&amp;A (status post tonsillectomy and adenoidectomy)  ·	S/P cataract surgeryb/l  ·	S/P ORIF (open reduction internal fixation) fractureleft ankle  ·	H/O colonoscopy  ·	History of esophagogastroduodenoscopy (EGD)        SOCIAL HISTORY:   ·	Former smoker in college, 1ppd fro approx 4 years, quite many years prior      FAMILY HISTORY:  ·	Pt is adopted     MEDICATIONS  (STANDING):  aspirin 325 milliGRAM(s) Oral daily  atorvastatin 40 milliGRAM(s) Oral at bedtime  dextrose 40% Gel 15 Gram(s) Oral once  dextrose 5%. 1000 milliLiter(s) (50 mL/Hr) IV Continuous <Continuous>  dextrose 5%. 1000 milliLiter(s) (100 mL/Hr) IV Continuous <Continuous>  dextrose 50% Injectable 25 Gram(s) IV Push once  dextrose 50% Injectable 12.5 Gram(s) IV Push once  dextrose 50% Injectable 25 Gram(s) IV Push once  enoxaparin Injectable 40 milliGRAM(s) SubCutaneous every 12 hours  escitalopram 20 milliGRAM(s) Oral daily  furosemide   Injectable 40 milliGRAM(s) IV Push three times a day  glucagon  Injectable 1 milliGRAM(s) IntraMuscular once  influenza   Vaccine 0.5 milliLiter(s) IntraMuscular once  insulin glargine Injectable (LANTUS) 5 Unit(s) SubCutaneous every morning  insulin glargine Injectable (LANTUS) 5 Unit(s) SubCutaneous at bedtime  insulin lispro (ADMELOG) corrective regimen sliding scale   SubCutaneous three times a day before meals  insulin lispro (ADMELOG) corrective regimen sliding scale   SubCutaneous at bedtime  levothyroxine 150 MICROGram(s) Oral daily  lisinopril 40 milliGRAM(s) Oral daily  nebivolol 5 milliGRAM(s) Oral daily  oxybutynin 5 milliGRAM(s) Oral two times a day  potassium chloride    Tablet ER 40 milliEquivalent(s) Oral every 4 hours    MEDICATIONS  (PRN):  acetaminophen   Tablet .. 650 milliGRAM(s) Oral every 6 hours PRN Mild Pain (1 - 3)  sodium chloride 0.65% Nasal 1 Spray(s) Both Nostrils two times a day PRN Nasal Congestion      Vital Signs Last 24 Hrs  T(C): 37.1 (24 Sep 2021 08:02), Max: 37.5 (24 Sep 2021 06:37)  T(F): 98.7 (24 Sep 2021 08:02), Max: 99.5 (24 Sep 2021 06:37)  HR: 50 (24 Sep 2021 08:02) (50 - 54)  BP: 149/63 (24 Sep 2021 08:02) (119/47 - 154/63)  BP(mean): --  RR: 18 (24 Sep 2021 08:02) (18 - 19)  SpO2: 96% (24 Sep 2021 08:02) (96% - 99%)      PHYSICAL EXAMINATION:  GENERAL APPEARANCE:  No distress. No accessory muscle use. Obese. On NC  HEAD: Normocephalic atraumatic   EYES: Pupils are normal. No icterus. Sclera white.   HEENT:  Mucus membranes moist. Oropharynx normal.   NECK:  Supple. No cervical or supraclavicular lymphadenopathy appreciated. No stridor.  HEART:  Normal S1 and S2. There are no murmurs appreciated.   CHEST:  Crackles bilateral bases.   ABDOMEN:  Soft and nontender. Nondistended.   EXTREMITIES:  There is no cyanosis, clubbing. B/L edema L>R - patient notes this is chronic.   SKIN:  No rash or significant lesions are noted. No jaundice.  PSYCH: Normal affect.  NEURO: Alert and oriented. Responds to question appropriate. No focal deficits appreciated.         LABS:                        13.7   7.07  )-----------( 162      ( 24 Sep 2021 08:14 )             44.8     09-24    139  |  102  |  14  ----------------------------<  156<H>  3.4<L>   |  34<H>  |  0.50    Ca    9.0      24 Sep 2021 08:14  Phos  3.0     09-24  Mg     2.0     09-24        RADIOLOGY & ADDITIONAL STUDIES:   *Imaging personally reviewed     EXAM: CT ANGIO CHEST PULM ART [facility]  PROCEDURE DATE: 09/21/2021  LUNGS AND AIRWAYS: Patent central airways. Mild pulmonary edema. Bilateral lower lobe compressive atelectasis.  PLEURA: Small to mild bilateral pleural effusions.  MEDIASTINUM AND ARMOND: Several prevascular lymph nodes measuring up to 1.3 cm. Subcentimeter subcarinal and bilateral hilar lymph nodes.  VESSELS: Atherosclerotic changes of the aorta and coronary vasculature. No aortic aneurysm. No evidence of acute pulmonary embolism.  HEART: Heart size is normal. No pericardial effusion.  CHEST WALL AND LOWER NECK: Within normal limits.  VISUALIZED UPPER ABDOMEN: Hepatic steatosis. Gallstones. Calcification along the right hepatic dome. Subcentimeter indeterminate nodularity of the bilateral adrenal glands. Posterior right upper pole renal cyst.  BONES: Degenerative changes.    IMPRESSION:  No evidence of acute pulmonary embolism.  Pulmonary edema with bilateral pleural effusions and bibasilar compressive atelectasis.  Indeterminate subcentimeter bilateral adrenal nodularity.

## 2021-09-24 NOTE — PHARMACOTHERAPY INTERVENTION NOTE - INTERVENTION TYPE RECOOMEND
Therapy Discontinuation Recommended - No indication
Therapy Recommended - Drug indicated but not ordered
Timing/Frequency of Administration Recommended

## 2021-09-25 ENCOUNTER — TRANSCRIPTION ENCOUNTER (OUTPATIENT)
Age: 81
End: 2021-09-25

## 2021-09-25 VITALS
TEMPERATURE: 98 F | SYSTOLIC BLOOD PRESSURE: 147 MMHG | HEART RATE: 63 BPM | OXYGEN SATURATION: 92 % | DIASTOLIC BLOOD PRESSURE: 53 MMHG | RESPIRATION RATE: 18 BRPM

## 2021-09-25 LAB
ALBUMIN SERPL ELPH-MCNC: 3 G/DL — LOW (ref 3.3–5)
ALP SERPL-CCNC: 63 U/L — SIGNIFICANT CHANGE UP (ref 40–120)
ALT FLD-CCNC: 25 U/L — SIGNIFICANT CHANGE UP (ref 12–78)
ANION GAP SERPL CALC-SCNC: 3 MMOL/L — LOW (ref 5–17)
AST SERPL-CCNC: 17 U/L — SIGNIFICANT CHANGE UP (ref 15–37)
BILIRUB SERPL-MCNC: 0.6 MG/DL — SIGNIFICANT CHANGE UP (ref 0.2–1.2)
BUN SERPL-MCNC: 14 MG/DL — SIGNIFICANT CHANGE UP (ref 7–23)
CALCIUM SERPL-MCNC: 9.2 MG/DL — SIGNIFICANT CHANGE UP (ref 8.5–10.1)
CHLORIDE SERPL-SCNC: 103 MMOL/L — SIGNIFICANT CHANGE UP (ref 96–108)
CO2 SERPL-SCNC: 34 MMOL/L — HIGH (ref 22–31)
CREAT SERPL-MCNC: 0.51 MG/DL — SIGNIFICANT CHANGE UP (ref 0.5–1.3)
CULTURE RESULTS: SIGNIFICANT CHANGE UP
CULTURE RESULTS: SIGNIFICANT CHANGE UP
GLUCOSE SERPL-MCNC: 134 MG/DL — HIGH (ref 70–99)
HCT VFR BLD CALC: 46.4 % — HIGH (ref 34.5–45)
HGB BLD-MCNC: 14 G/DL — SIGNIFICANT CHANGE UP (ref 11.5–15.5)
MCHC RBC-ENTMCNC: 28.3 PG — SIGNIFICANT CHANGE UP (ref 27–34)
MCHC RBC-ENTMCNC: 30.2 GM/DL — LOW (ref 32–36)
MCV RBC AUTO: 93.7 FL — SIGNIFICANT CHANGE UP (ref 80–100)
PLATELET # BLD AUTO: 142 K/UL — LOW (ref 150–400)
POTASSIUM SERPL-MCNC: 3.9 MMOL/L — SIGNIFICANT CHANGE UP (ref 3.5–5.3)
POTASSIUM SERPL-SCNC: 3.9 MMOL/L — SIGNIFICANT CHANGE UP (ref 3.5–5.3)
PROT SERPL-MCNC: 7 GM/DL — SIGNIFICANT CHANGE UP (ref 6–8.3)
RBC # BLD: 4.95 M/UL — SIGNIFICANT CHANGE UP (ref 3.8–5.2)
RBC # FLD: 15.1 % — HIGH (ref 10.3–14.5)
SODIUM SERPL-SCNC: 140 MMOL/L — SIGNIFICANT CHANGE UP (ref 135–145)
SPECIMEN SOURCE: SIGNIFICANT CHANGE UP
SPECIMEN SOURCE: SIGNIFICANT CHANGE UP
WBC # BLD: 6.08 K/UL — SIGNIFICANT CHANGE UP (ref 3.8–10.5)
WBC # FLD AUTO: 6.08 K/UL — SIGNIFICANT CHANGE UP (ref 3.8–10.5)

## 2021-09-25 PROCEDURE — 99233 SBSQ HOSP IP/OBS HIGH 50: CPT

## 2021-09-25 PROCEDURE — 99239 HOSP IP/OBS DSCHRG MGMT >30: CPT

## 2021-09-25 RX ORDER — LISINOPRIL 2.5 MG/1
1 TABLET ORAL
Qty: 30 | Refills: 0
Start: 2021-09-25 | End: 2021-10-24

## 2021-09-25 RX ORDER — AMLODIPINE BESYLATE AND BENAZEPRIL HYDROCHLORIDE 10; 20 MG/1; MG/1
1 CAPSULE ORAL
Qty: 0 | Refills: 0 | DISCHARGE

## 2021-09-25 RX ADMIN — INSULIN GLARGINE 5 UNIT(S): 100 INJECTION, SOLUTION SUBCUTANEOUS at 10:12

## 2021-09-25 RX ADMIN — Medication 150 MICROGRAM(S): at 05:28

## 2021-09-25 RX ADMIN — Medication 40 MILLIGRAM(S): at 05:28

## 2021-09-25 RX ADMIN — Medication 1: at 12:37

## 2021-09-25 RX ADMIN — ESCITALOPRAM OXALATE 20 MILLIGRAM(S): 10 TABLET, FILM COATED ORAL at 10:14

## 2021-09-25 RX ADMIN — ENOXAPARIN SODIUM 40 MILLIGRAM(S): 100 INJECTION SUBCUTANEOUS at 10:13

## 2021-09-25 RX ADMIN — Medication 325 MILLIGRAM(S): at 10:13

## 2021-09-25 RX ADMIN — LISINOPRIL 40 MILLIGRAM(S): 2.5 TABLET ORAL at 10:19

## 2021-09-25 RX ADMIN — Medication 5 MILLIGRAM(S): at 10:15

## 2021-09-25 NOTE — PROGRESS NOTE ADULT - PROVIDER SPECIALTY LIST ADULT
Pulmonology
Cardiology
Hospitalist
Cardiology

## 2021-09-25 NOTE — DISCHARGE NOTE PROVIDER - NSDCMRMEDTOKEN_GEN_ALL_CORE_FT
aspirin 325 mg oral tablet: 1 tab(s) orally once a day  Bystolic 5 mg oral tablet: 1 tab(s) orally once a day  Crestor 40 mg oral tablet: 1 tab(s) orally once a day  escitalopram 20 mg oral tablet: 1 tab(s) orally once a day  furosemide 40 mg oral tablet: 1 tab(s) orally once a day  Januvia 100 mg oral tablet: 1 tab(s) orally once a day  levothyroxine 150 mcg (0.15 mg) oral capsule: every other day (3 times a week)  levothyroxine 175 mcg (0.175 mg) oral tablet: every other day (4 times a week)  lisinopril 40 mg oral tablet: 1 tab(s) orally once a day  nateglinide 120 mg oral tablet: 1 tab(s) orally 3 times a day (before meals)  Toviaz 4 mg oral tablet, extended release: 1 tab(s) orally once a day

## 2021-09-25 NOTE — PROGRESS NOTE ADULT - SUBJECTIVE AND OBJECTIVE BOX
SUBJECTIVE:  Seen and examined today.  Sitting upright in bed.  Remains on NC.  Breathing has improved.  She is eating and ambulating to the bathroom.    All other review of systems negative.         PHYSICAL EXAMINATION:  GENERAL APPEARANCE:  No distress. No accessory muscle use. Obese. On NC  HEAD: Normocephalic atraumatic   EYES: Pupils are normal. No icterus. Sclera white.   HEENT:  Mucus membranes moist. Oropharynx normal.   NECK:  Supple. No cervical or supraclavicular lymphadenopathy appreciated. No stridor.  HEART:  Normal S1 and S2. There are no murmurs appreciated.   CHEST:  Crackles bilateral bases.   ABDOMEN:  Soft and nontender. Nondistended.   EXTREMITIES:  There is no cyanosis, clubbing. B/L edema L>R - patient notes this is chronic.   SKIN:  No rash or significant lesions are noted. No jaundice.  PSYCH: Normal affect.  NEURO: Alert and oriented. Responds to question appropriate. No focal deficits appreciated.       Vital Signs Last 24 Hrs  T(C): 36.4 (25 Sep 2021 08:27), Max: 36.6 (24 Sep 2021 23:29)  T(F): 97.5 (25 Sep 2021 08:27), Max: 97.9 (24 Sep 2021 23:29)  HR: 49 (25 Sep 2021 08:27) (49 - 54)  BP: 165/53 (25 Sep 2021 08:27) (131/75 - 165/53)  BP(mean): --  RR: 18 (25 Sep 2021 08:27) (18 - 18)  SpO2: 94% (25 Sep 2021 08:27) (93% - 99%)    MEDICATIONS  (STANDING):  aspirin 325 milliGRAM(s) Oral daily  atorvastatin 40 milliGRAM(s) Oral at bedtime  dextrose 40% Gel 15 Gram(s) Oral once  dextrose 5%. 1000 milliLiter(s) (50 mL/Hr) IV Continuous <Continuous>  dextrose 5%. 1000 milliLiter(s) (100 mL/Hr) IV Continuous <Continuous>  dextrose 50% Injectable 25 Gram(s) IV Push once  dextrose 50% Injectable 25 Gram(s) IV Push once  dextrose 50% Injectable 12.5 Gram(s) IV Push once  enoxaparin Injectable 40 milliGRAM(s) SubCutaneous every 12 hours  escitalopram 20 milliGRAM(s) Oral daily  furosemide    Tablet 40 milliGRAM(s) Oral every 12 hours  glucagon  Injectable 1 milliGRAM(s) IntraMuscular once  influenza   Vaccine 0.5 milliLiter(s) IntraMuscular once  insulin glargine Injectable (LANTUS) 5 Unit(s) SubCutaneous every morning  insulin glargine Injectable (LANTUS) 5 Unit(s) SubCutaneous at bedtime  insulin lispro (ADMELOG) corrective regimen sliding scale   SubCutaneous three times a day before meals  insulin lispro (ADMELOG) corrective regimen sliding scale   SubCutaneous at bedtime  levothyroxine 150 MICROGram(s) Oral daily  lisinopril 40 milliGRAM(s) Oral daily  nebivolol 5 milliGRAM(s) Oral daily  oxybutynin 5 milliGRAM(s) Oral two times a day    MEDICATIONS  (PRN):  acetaminophen   Tablet .. 650 milliGRAM(s) Oral every 6 hours PRN Mild Pain (1 - 3)  sodium chloride 0.65% Nasal 1 Spray(s) Both Nostrils two times a day PRN Nasal Congestion      Allergies    Allergy Status Unknown    Intolerances    epinephrine (Other)      LABS:                        14.0   6.08  )-----------( 142      ( 25 Sep 2021 07:32 )             46.4     09-25    140  |  103  |  14  ----------------------------<  134<H>  3.9   |  34<H>  |  0.51    Ca    9.2      25 Sep 2021 07:32  Phos  3.0     09-24  Mg     2.0     09-24    TPro  7.0  /  Alb  3.0<L>  /  TBili  0.6  /  DBili  x   /  AST  17  /  ALT  25  /  AlkPhos  63  09-25    LIVER FUNCTIONS - ( 25 Sep 2021 07:32 )  Alb: 3.0 g/dL / Pro: 7.0 gm/dL / ALK PHOS: 63 U/L / ALT: 25 U/L / AST: 17 U/L / GGT: x             ABG - ( 24 Sep 2021 11:58 )  pH, Arterial: 7.42  pH, Blood: x     /  pCO2: 56    /  pO2: 69    / HCO3: 36    / Base Excess: 9.7   /  SaO2: 94          RADIOLOGY & ADDITIONAL STUDIES:   *Imaging personally reviewed     9/24/2021 - CXR showed improved infiltrates

## 2021-09-25 NOTE — PROGRESS NOTE ADULT - PROBLEM SELECTOR PLAN 2
Improved today. Continue current medications

## 2021-09-25 NOTE — PROGRESS NOTE ADULT - SUBJECTIVE AND OBJECTIVE BOX
CC: SOB  CHF exac     Pt is a 79 y/o female w/ PMHx of Anxiety, knee osteoarthritis, Hypertension, Hypothyroidism, Morbid obesity, Panic attacks, Diabetes mellitus II, Urine incontinence on  lasix  every three days who presents to [facility]  ED with increasing shortness of breath. Pt reports worsening  dyspnea with walking from room to room for the last week.   She also has  a dry cough, orthopnea,  chronic leg swelling.  Pt. stated that symptoms are worse at night.    She denies increase in sodium intake but admits to having cheese frequently and Progresso canned soup.      She had reported a post-nasal drip at night with a cough and was treated with an Antibiotic last month.  She said she felt better , however around  she started to have similar symptoms and also tried Mucinex. She c/o feeling tired despite all the recent treatments and reports having a dripping urinary incontinence for which DR. Stevens started her on Toviaz and Myrbetriq       Medical progress: Patient overall feels much better. Denies any HA, CP, SOB.   Complaints: no new complaints  State of mind: maintains normal conversation     Despite the medical interventions with IV diuresis patient desaturates.    - Room air pulse ox. at rest:  85%       - Room air pulse ox while ambulatin %    - Pulse ox while ambulating on 3 liters n/c  O2 92 %    Patient will require home O2 for discharge.  Patient is aware and agreeable to home O2.  Patient is in a chronic stable state, resolution of pulmonary edema will take time for the resolution.   Patient treated for pneumonia: YES/NO    INTERVAL HPI/ OVERNIGHT EVENTS:    - Pt was seen and examined,  slowly is getting better, still SON on ambulation, no complains. As per RN had 2l UO after second dose of lasix yesterday     REVIEW OF SYSTEMS:  All other review of systems is negative unless indicated above.    PHYSICAL EXAM:  ICU Vital Signs Last 24 Hrs  T(C): 36.4 (25 Sep 2021 08:27), Max: 36.6 (24 Sep 2021 23:29)  T(F): 97.5 (25 Sep 2021 08:27), Max: 97.9 (24 Sep 2021 23:29)  HR: 49 (25 Sep 2021 08:27) (49 - 54)  BP: 165/53 (25 Sep 2021 08:27) (131/75 - 165/53)  RR: 18 (25 Sep 2021 08:27) (18 - 18)  SpO2: 94% (25 Sep 2021 08:27) (93% - 99%)    General: Well developed; well nourished; in no acute distress  Eyes: PERRLA, EOMI; conjunctiva and sclera clear  Head: Normocephalic; atraumatic  ENMT: No nasal discharge; airway clear  Neck: Supple; non tender; no masses  Respiratory: Diminished BS at bases, mild  rales   Cardiovascular: Regular rate and rhythm. S1 and S2 Normal;   Gastrointestinal: Soft non-tender non-distended; Normal bowel sounds  Genitourinary: No  suprapubic  tenderness  Extremities:  edema improving   Vascular: Peripheral pulses palpable 2+ bilaterally  Neurological: Alert and oriented x3, non focal   Musculoskeletal: Normal muscle tone and strength   Psychiatric: Cooperative and appropriate      LABS:     CBC Full  -  ( 25 Sep 2021 07:32 )  WBC Count : 6.08 K/uL  RBC Count : 4.95 M/uL  Hemoglobin : 14.0 g/dL  Hematocrit : 46.4 %  Platelet Count - Automated : 142 K/uL  Mean Cell Volume : 93.7 fl  Mean Cell Hemoglobin : 28.3 pg  Mean Cell Hemoglobin Concentration : 30.2 gm/dL  Auto Neutrophil # : x  Auto Lymphocyte # : x  Auto Monocyte # : x  Auto Eosinophil # : x  Auto Basophil # : x  Auto Neutrophil % : x  Auto Lymphocyte % : x  Auto Monocyte % : x  Auto Eosinophil % : x  Auto Basophil % : x        -    140  |  103  |  14  ----------------------------<  134<H>  3.9   |  34<H>  |  0.51    Ca    9.2      25 Sep 2021 07:32  Phos  3.0     09-24  Mg     2.0     09-24    TPro  7.0  /  Alb  3.0<L>  /  TBili  0.6  /  DBili  x   /  AST  17  /  ALT  25  /  AlkPhos  63  09-25      79 y/o female w/ PMHx of Anxiety, knee osteoarthritis, Hypertension, Hypothyroidism, Morbid obesity, Panic attacks, Diabetes mellitus II, Urine incontinence on  lasix  admitted for:       # Acute Hypoxic  Respiratory failure 2/2 Acute Diastolic CHF exacerbation  - pending respiratory therapy eval  - pending ABG  - LE doppler for acute on chronic edema checked and neg for DVT   - CTA : PE neg, + intersitial edema and small pleural effusions  - C/w IV lasix , increase to 40mg TID   - Monitor Is and Os  - Check Weight daily - 3lbs  - C/w tele: episodes of Sinus bradycardia     # DM Type II   - hold PO meds  - Monitor BS, cover with ISS   - Diabetic diet     # HTN   - Monitor BP, borderline low  - C/w Nebivilol, Lisinopril, d/c  amlodipine for now    # Hypothyroidism  - C/w Synthroid  - Check TSK    # Urinary incontinence  - C/w Oxybutynin    # HLD  - C/w Lipitor     # Anxiety  - C/w lexapro     # Obesity, suspect LINDA with pulm HTN on echo  - dietitian eval   - will need Pulm eval outPt

## 2021-09-25 NOTE — DISCHARGE NOTE NURSING/CASE MANAGEMENT/SOCIAL WORK - PATIENT PORTAL LINK FT
You can access the FollowMyHealth Patient Portal offered by [facility] by registering at the following website: [email or web address]. By joining [facility]’s FollowMyHealth portal, you will also be able to view your health information using other applications (apps) compatible with our system.

## 2021-09-25 NOTE — PROGRESS NOTE ADULT - REASON FOR ADMISSION
SOB  CHF exac

## 2021-09-25 NOTE — PROGRESS NOTE ADULT - PROBLEM SELECTOR PLAN 1
Possibly HFpEF. However low pBNP and normal EF.Pt feels improved. Continue diuresis. Monitor lytes. Echo reviewed. Discussed with Pulmonary. Plan follow up for LINDA
Probably HFpEF. Pt feels improved. Continue diuresis. Monitor lytes. Echo reviewed.
Possibly HFpEF. However low pBNP and normal EF.Pt feels improved. Continue diuresis. Monitor lytes. Echo reviewed. Discussed with Pulmonary. Plan follow up for LINDA as opt
Probably HFpEF. Pt feels improved. Continue diuresis. Monitor lytes. Echo reviewed.

## 2021-09-25 NOTE — PROGRESS NOTE ADULT - ASSESSMENT
80 year-old woman, presents for shortness of breath, CT shows ground glass changes, with bilteral effusions, this is strongly suggestive of CHF and pulmonary edema.   --She has clinically and radiographically improved since admission with diuresis.      Recommendations:  --c/w diuresis as per cardiology  --Out of bed to chair, ambulation as tolerated  --Wean oxygen as tolerated    Dr. House will follow up on Monday.   Notify me with any questions or concerns over the weekend.

## 2021-09-25 NOTE — DISCHARGE NOTE PROVIDER - CARE PROVIDERS DIRECT ADDRESSES
[email or web address]

## 2021-09-25 NOTE — DISCHARGE NOTE PROVIDER - CARE PROVIDER_API CALL
Orlando House)  Internal Medicine; Pulmonary Disease  [address], [address]  [place] [address]  Phone: [phone]  Fax: [phone]  Follow Up Time:     Ishan Lozano)  Cardiovascular Disease  [address], [address]  [place] [address]  Phone: [phone]  Fax: [phone]  Follow Up Time:

## 2021-09-25 NOTE — DISCHARGE NOTE NURSING/CASE MANAGEMENT/SOCIAL WORK - NSDCFUADDAPPT_GEN_ALL_CORE_FT
please make an appointment with Dr. House within 1 week please make an appointment with Dr. House within 1 week  Pt with CHF. Office is closed. An appointment will be made on Monday and unit will f/u with patient.  please make an appointment with Dr. House within 1 week  Pt with CHF. Office is closed. An appointment will be made on Monday and unit will f/u with patient.     9/27/21 Appointments made with Dr. House and Dr. Lozano.    Dr. Lozano 9/29/21 with NP Cara Quinn @ 11:30am  Dr. House 9/28/21 with Dr. House @ 3:30pm

## 2021-09-25 NOTE — DISCHARGE NOTE PROVIDER - NSDCFUSCHEDAPPT_GEN_ALL_CORE_FT
AMALIA BELCHER ; 09/28/2021 ; NPP IntMed [address]  AMALIA BELCHER ; 09/29/2021 ; NPP Cardio [address]

## 2021-09-25 NOTE — DISCHARGE NOTE PROVIDER - NSDCCPCAREPLAN_GEN_ALL_CORE_FT
PRINCIPAL DISCHARGE DIAGNOSIS  Diagnosis: Dyspnea  Assessment and Plan of Treatment: - most likely suggestive of pulmonary edema  - please maintain good complience with lasix  - monitor daily weights  - low salt diet  - please follow up with Dr. House /  Dr. Lozano      SECONDARY DISCHARGE DIAGNOSES  Diagnosis: Chest tightness  Assessment and Plan of Treatment: - now chest pain free     PRINCIPAL DISCHARGE DIAGNOSIS  Diagnosis: Dyspnea  Assessment and Plan of Treatment: # Acute on chronic mix hypoxic hypercapnic respiratory failure.   - most likely suggestive of pulmonary edema due to acute diastolic CHF  - please maintain good complience with lasix  - monitor daily weights  - low salt diet  - please follow up with Dr. House /  Dr. Lozano  - outpatient sleep study  - outpatient pulmonary function test  - strongly recommended weight loss      SECONDARY DISCHARGE DIAGNOSES  Diagnosis: Chest tightness  Assessment and Plan of Treatment: - now chest pain free

## 2021-09-25 NOTE — PROGRESS NOTE ADULT - PROBLEM SELECTOR PLAN 4
Encourage caloric reduction. Is bariatric intervention feasable?

## 2021-09-25 NOTE — DISCHARGE NOTE PROVIDER - HOSPITAL COURSE
Pt is a 81 y/o female w/ PMHx of Anxiety, knee osteoarthritis, Hypertension, Hypothyroidism, Morbid obesity, Panic attacks, Diabetes mellitus II, Urine incontinence on  lasix  every three days who presents to [place]  [facility] with increasing shortness of breath. Pt reports worsening  dyspnea with walking from room to room for the last week.   She also has  a dry cough, orthopnea,  chronic leg swelling.  Pt. stated that symptoms are worse at night.    She denies increase in sodium intake but admits to having cheese frequently and Progresso canned soup.      She had reported a post-nasal drip at night with a cough and was treated with an Antibiotic last month.  She said she felt better , however around  she started to have similar symptoms and also tried Mucinex. She c/o feeling tired despite all the recent treatments and reports having a dripping urinary incontinence for which DR. Stevens started her on Toviaz and Myrbetriq       Medical progress: Significant improvement in SOB since admission. Denies any HA, CP, SOB.  No fevers, chills or shakes. able to speak full sentances. patient to leave with 3 L NC. Care discussed with Pulmonary on discharge  Complaints: no new complaints  State of mind: maintains normal conversation /  home O2 to be arranged today     Despite the medical interventions with IV diuresis patient desaturates.    - Room air pulse ox. at rest:  85%       - Room air pulse ox while ambulatin %    - Pulse ox while ambulating on 3 liters n/c  O2 92 %    Patient will require home O2 for discharge.  Patient is aware and agreeable to home O2.  Patient is in a chronic stable state, resolution of pulmonary edema will take time for the resolution.   Patient treated for pneumonia: YES/NO

## 2021-09-28 ENCOUNTER — APPOINTMENT (OUTPATIENT)
Dept: INTERNAL MEDICINE | Facility: CLINIC | Age: 81
End: 2021-09-28
Payer: MEDICARE

## 2021-09-28 VITALS
SYSTOLIC BLOOD PRESSURE: 130 MMHG | BODY MASS INDEX: 41.48 KG/M2 | TEMPERATURE: 98.7 F | DIASTOLIC BLOOD PRESSURE: 60 MMHG | WEIGHT: 249 LBS | OXYGEN SATURATION: 95 % | RESPIRATION RATE: 17 BRPM | HEIGHT: 65 IN | HEART RATE: 82 BPM

## 2021-09-28 PROCEDURE — 99495 TRANSJ CARE MGMT MOD F2F 14D: CPT

## 2021-09-28 RX ORDER — AMLODIPINE AND ATORVASTATIN 10; 40 MG/1; MG/1
10-40 TABLET, COATED ORAL
Refills: 0 | Status: DISCONTINUED | COMMUNITY
End: 2021-09-28

## 2021-09-28 RX ORDER — METFORMIN HYDROCHLORIDE 500 MG/1
500 TABLET, COATED ORAL
Refills: 0 | Status: DISCONTINUED | COMMUNITY
End: 2021-09-28

## 2021-09-28 RX ORDER — CIPROFLOXACIN HYDROCHLORIDE 500 MG/1
500 TABLET, FILM COATED ORAL
Qty: 14 | Refills: 0 | Status: DISCONTINUED | COMMUNITY
Start: 2021-08-28 | End: 2021-09-28

## 2021-09-28 RX ORDER — NITROFURANTOIN (MONOHYDRATE/MACROCRYSTALS) 25; 75 MG/1; MG/1
100 CAPSULE ORAL 3 TIMES DAILY
Qty: 21 | Refills: 0 | Status: DISCONTINUED | COMMUNITY
Start: 2020-09-21 | End: 2021-09-28

## 2021-09-28 RX ORDER — OXYBUTYNIN CHLORIDE 10 MG/1
10 TABLET, EXTENDED RELEASE ORAL
Refills: 0 | Status: DISCONTINUED | COMMUNITY
End: 2021-09-28

## 2021-09-28 NOTE — REASON FOR VISIT
[Follow-Up - From Hospitalization] : a follow-up visit after a recent hospitalization [Sleep Evaluation] : sleep evaluation [Hypersomnolence] : hypersomnolence [Shortness of Breath] : shortness of breath

## 2021-09-28 NOTE — HISTORY OF PRESENT ILLNESS
[TextBox_4] : Ms. Lara is an 80-year-old female who presents for a hospital followup evaluation. She was admitted on 9/20/21 with several days of worsening shortness of breath and leg edema. Initial chest x-ray showed evidence of pulmonary vascular congestion. CT angiogram of the chest showed no evidence of pulmonary emboli, however, the patient had pulmonary congestion and small bilateral pleural effusions. Cardiac enzymes were negative. BNP level was within normal limits. Transthoracic echocardiogram showed LVEF of 60% with the pulmonary artery systolic pressure of 43 indicating mild pulmonary hypertension. The patient remained hypoxic throughout her stay and was discharged on oxygen 2 L per minute nasal cannula. Ms. Lara does have symptoms of increased daytime tiredness. Please see results of [place] sleepiness score.

## 2021-09-28 NOTE — DISCUSSION/SUMMARY
[FreeTextEntry1] : Ms. Lara is an 80-year-old female presents for followup evaluation. She is admitted to the hospital on 9/20 with worsening shortness of breath.\par \par #1. CT scan of chest that showed pulmonary congestion with bilateral pleural effusions. Echocardiogram showed normal LVEF percent with a mild pulmonary hypertension with PA P. of 42 mm mercury\par \par #2. Patient states that she feels significantly improved since starting on supplemental oxygen. She is wearing at night when she sleeps.\par \par #3. Patient does have significant symptoms of daytime tiredness and difficulty with cognition. [place] sleepiness score was 12 which indicates possibility of obstructive sleep apnea. She will be set up for home polysomnography examination.\par \par #4. Followup in this office in 2 months.

## 2021-09-29 ENCOUNTER — APPOINTMENT (OUTPATIENT)
Dept: CARDIOLOGY | Facility: CLINIC | Age: 81
End: 2021-09-29
Payer: MEDICARE

## 2021-09-29 ENCOUNTER — NON-APPOINTMENT (OUTPATIENT)
Age: 81
End: 2021-09-29

## 2021-09-29 VITALS
SYSTOLIC BLOOD PRESSURE: 140 MMHG | WEIGHT: 249 LBS | BODY MASS INDEX: 41.48 KG/M2 | DIASTOLIC BLOOD PRESSURE: 78 MMHG | OXYGEN SATURATION: 95 % | HEART RATE: 69 BPM | HEIGHT: 65 IN

## 2021-09-29 DIAGNOSIS — F41.9 ANXIETY DISORDER, UNSPECIFIED: ICD-10-CM

## 2021-09-29 DIAGNOSIS — E66.01 MORBID (SEVERE) OBESITY DUE TO EXCESS CALORIES: ICD-10-CM

## 2021-09-29 DIAGNOSIS — I27.20 PULMONARY HYPERTENSION, UNSPECIFIED: ICD-10-CM

## 2021-09-29 DIAGNOSIS — M17.10 UNILATERAL PRIMARY OSTEOARTHRITIS, UNSPECIFIED KNEE: ICD-10-CM

## 2021-09-29 DIAGNOSIS — G47.33 OBSTRUCTIVE SLEEP APNEA (ADULT) (PEDIATRIC): ICD-10-CM

## 2021-09-29 DIAGNOSIS — Z79.84 LONG TERM (CURRENT) USE OF ORAL HYPOGLYCEMIC DRUGS: ICD-10-CM

## 2021-09-29 DIAGNOSIS — E11.9 TYPE 2 DIABETES MELLITUS WITHOUT COMPLICATIONS: ICD-10-CM

## 2021-09-29 DIAGNOSIS — J96.22 ACUTE AND CHRONIC RESPIRATORY FAILURE WITH HYPERCAPNIA: ICD-10-CM

## 2021-09-29 DIAGNOSIS — J96.21 ACUTE AND CHRONIC RESPIRATORY FAILURE WITH HYPOXIA: ICD-10-CM

## 2021-09-29 DIAGNOSIS — E03.9 HYPOTHYROIDISM, UNSPECIFIED: ICD-10-CM

## 2021-09-29 DIAGNOSIS — E78.5 HYPERLIPIDEMIA, UNSPECIFIED: ICD-10-CM

## 2021-09-29 DIAGNOSIS — R32 UNSPECIFIED URINARY INCONTINENCE: ICD-10-CM

## 2021-09-29 DIAGNOSIS — I11.0 HYPERTENSIVE HEART DISEASE WITH HEART FAILURE: ICD-10-CM

## 2021-09-29 DIAGNOSIS — Z87.891 PERSONAL HISTORY OF NICOTINE DEPENDENCE: ICD-10-CM

## 2021-09-29 DIAGNOSIS — I50.31 ACUTE DIASTOLIC (CONGESTIVE) HEART FAILURE: ICD-10-CM

## 2021-09-29 PROCEDURE — 99214 OFFICE O/P EST MOD 30 MIN: CPT

## 2021-09-29 PROCEDURE — 93000 ELECTROCARDIOGRAM COMPLETE: CPT

## 2021-09-29 RX ORDER — SITAGLIPTIN 100 MG/1
100 TABLET, FILM COATED ORAL
Refills: 0 | Status: DISCONTINUED | COMMUNITY
End: 2021-09-29

## 2021-09-29 RX ORDER — FUROSEMIDE 20 MG/1
20 TABLET ORAL
Refills: 0 | Status: DISCONTINUED | COMMUNITY
End: 2021-09-29

## 2021-09-29 RX ORDER — ESCITALOPRAM OXALATE 20 MG/1
20 TABLET ORAL DAILY
Refills: 0 | Status: ACTIVE | COMMUNITY

## 2021-10-03 NOTE — PHYSICAL EXAM
[Normal S1, S2] : normal S1, S2 [Soft] : abdomen soft [Non Tender] : non-tender [No Edema] : no edema [Alert and Oriented] : alert and oriented [Normal] : moves all extremities, no focal deficits, normal speech [de-identified] : Overweight  [de-identified] : with assistance

## 2021-10-03 NOTE — DISCUSSION/SUMMARY
[FreeTextEntry1] : S/P hospital discharge admitted with Acute on chronic  hypoxic hypercapnic respiratory failure suggestive of pulmonary edema due to possible acute diastolic CHF, she currently is doing well, she was adequately diuresed, SOB has resolved, LE edema improved However, LE Doppler for acute on chronic edema checked and neg for DVT;  Despite the medical interventions with IV diuresis patient desaturates.she was discharged on 3L home O2 with sat's 95% she is followed with Pulmonary.  She will continue Lasix with repeat labs ordered, advised low sodium diet, daily weight and reassessment in 6 weeks; S/P cardiac cath 2018 Normal coronary arteries NL LV Systolic FX Moderate Pulmonary HTN continue diuretic for now OV 6 weeks ( may consider lowering diuretic ) Sleep study to be obtained with Pulmonary \par \par HTN: Controlled\par \par Obesity, suspect LINDA with Pulm HTN on echo sleep study as above \par \par DM: Medical management \par US Aorta/Carotid US screening ordered \par \par Encouraged weight loss low sodium diet \par \par OV one month Dr Peñaloza

## 2021-10-03 NOTE — HISTORY OF PRESENT ILLNESS
[FreeTextEntry1] : This is an 81 Y/O female PMH:  Anxiety, osteoarthritis, HTN, Hypothyroidism, Morbid obesity, Panic attacks, Diabetes mellitus who presents today S/P Hospital discharge admitted with increasing shortness of breath/cough/orthopnea chronic leg swelling  admitted with acute on chronic  hypoxic hypercapnic\par respiratory failure suggestive of pulmonary edema possibly  diastolic CHF Likely HFpEF,  CT angiogram of the chest showed no evidence of pulmonary emboli, however, the patient had pulmonary congestion and small bilateral pleural effusions.  Cardiac enzymes were negative. BNP level was within normal limits, Echo demonstrates LVEF of 60% with PASP of 43 indicating mild pulmonary hypertension  Patient was adequately diuresed, SOB has resolved, LE edema improved, she appears euvolemic.  However, Despite the medical interventions with IV diuresis patient desaturates.she was discharged on 3L home O2 with sat's 95% she is followed with Pulmonary \par \par \par  Echo 9/21/21\par  The mitral valve leaflets appear thickened.\par  Moderate mitral annular calcification is present.\par  Moderate (2+) mitral regurgitation is present.\par  Fibrocalcific changes noted to the Aortic valve leaflets with preserved\par  leaflet excursion.\par  Mild to Moderate Tricuspid regurgitation is present.\par  Normal appearing pulmonic valve structure and function.\par  The left atrium mildly dilated.\par  Mild concentric left ventricular hypertrophy is present.\par  Estimated left ventricular ejection fraction is 60 %.\par  Normal appearing right atrium.\par  Normal appearing right ventricle structure and function.\par \par \par

## 2021-10-12 ENCOUNTER — APPOINTMENT (OUTPATIENT)
Dept: CARE COORDINATION | Facility: HOME HEALTH | Age: 81
End: 2021-10-12

## 2021-10-13 ENCOUNTER — APPOINTMENT (OUTPATIENT)
Age: 81
End: 2021-10-13
Payer: MEDICARE

## 2021-10-13 ENCOUNTER — OUTPATIENT (OUTPATIENT)
Dept: OUTPATIENT SERVICES | Facility: HOSPITAL | Age: 81
LOS: 1 days | End: 2021-10-13
Payer: MEDICARE

## 2021-10-13 DIAGNOSIS — Z90.89 ACQUIRED ABSENCE OF OTHER ORGANS: Chronic | ICD-10-CM

## 2021-10-13 DIAGNOSIS — Z98.890 OTHER SPECIFIED POSTPROCEDURAL STATES: Chronic | ICD-10-CM

## 2021-10-13 DIAGNOSIS — Z41.9 ENCOUNTER FOR PROCEDURE FOR PURPOSES OTHER THAN REMEDYING HEALTH STATE, UNSPECIFIED: Chronic | ICD-10-CM

## 2021-10-13 DIAGNOSIS — G47.33 OBSTRUCTIVE SLEEP APNEA (ADULT) (PEDIATRIC): ICD-10-CM

## 2021-10-13 DIAGNOSIS — Z98.49 CATARACT EXTRACTION STATUS, UNSPECIFIED EYE: Chronic | ICD-10-CM

## 2021-10-13 PROCEDURE — 95806 SLEEP STUDY UNATT&RESP EFFT: CPT | Mod: 26

## 2021-10-13 PROCEDURE — 95806 SLEEP STUDY UNATT&RESP EFFT: CPT

## 2021-10-25 LAB
ALBUMIN SERPL ELPH-MCNC: 3.7 G/DL
ALP BLD-CCNC: 58 U/L
ALT SERPL-CCNC: 12 U/L
ANION GAP SERPL CALC-SCNC: 13 MMOL/L
AST SERPL-CCNC: 16 U/L
BILIRUB SERPL-MCNC: 0.4 MG/DL
BUN SERPL-MCNC: 12 MG/DL
CALCIUM SERPL-MCNC: 9.7 MG/DL
CHLORIDE SERPL-SCNC: 106 MMOL/L
CO2 SERPL-SCNC: 24 MMOL/L
CREAT SERPL-MCNC: 0.56 MG/DL
GLUCOSE SERPL-MCNC: 138 MG/DL
MAGNESIUM SERPL-MCNC: 2 MG/DL
POTASSIUM SERPL-SCNC: 3.9 MMOL/L
PROT SERPL-MCNC: 6.8 G/DL
SODIUM SERPL-SCNC: 142 MMOL/L

## 2021-10-26 ENCOUNTER — NON-APPOINTMENT (OUTPATIENT)
Age: 81
End: 2021-10-26

## 2021-11-02 ENCOUNTER — LABORATORY RESULT (OUTPATIENT)
Age: 81
End: 2021-11-02

## 2021-11-05 LAB
25(OH)D3 SERPL-MCNC: 21 NG/ML
ALBUMIN SERPL ELPH-MCNC: 3.8 G/DL
ALP BLD-CCNC: 58 U/L
ALT SERPL-CCNC: 11 U/L
ANION GAP SERPL CALC-SCNC: 17 MMOL/L
AST SERPL-CCNC: 13 U/L
BASOPHILS # BLD AUTO: 0.05 K/UL
BASOPHILS NFR BLD AUTO: 0.6 %
BILIRUB SERPL-MCNC: 0.4 MG/DL
BUN SERPL-MCNC: 13 MG/DL
CALCIUM SERPL-MCNC: 9.8 MG/DL
CHLORIDE SERPL-SCNC: 104 MMOL/L
CHOLEST SERPL-MCNC: 117 MG/DL
CO2 SERPL-SCNC: 23 MMOL/L
CREAT SERPL-MCNC: 0.62 MG/DL
EOSINOPHIL # BLD AUTO: 0.18 K/UL
EOSINOPHIL NFR BLD AUTO: 2.3 %
ESTIMATED AVERAGE GLUCOSE: 140 MG/DL
GLUCOSE SERPL-MCNC: 159 MG/DL
HBA1C MFR BLD HPLC: 6.5 %
HCT VFR BLD CALC: 43.7 %
HDLC SERPL-MCNC: 43 MG/DL
HGB BLD-MCNC: 13.6 G/DL
IMM GRANULOCYTES NFR BLD AUTO: 0.3 %
IRON SERPL-MCNC: 49 UG/DL
LDLC SERPL CALC-MCNC: 51 MG/DL
LYMPHOCYTES # BLD AUTO: 1.06 K/UL
LYMPHOCYTES NFR BLD AUTO: 13.4 %
MAN DIFF?: NORMAL
MCHC RBC-ENTMCNC: 28.3 PG
MCHC RBC-ENTMCNC: 31.1 GM/DL
MCV RBC AUTO: 91 FL
MONOCYTES # BLD AUTO: 0.57 K/UL
MONOCYTES NFR BLD AUTO: 7.2 %
NEUTROPHILS # BLD AUTO: 6.04 K/UL
NEUTROPHILS NFR BLD AUTO: 76.2 %
NONHDLC SERPL-MCNC: 74 MG/DL
PLATELET # BLD AUTO: 210 K/UL
POTASSIUM SERPL-SCNC: 3.8 MMOL/L
PROT SERPL-MCNC: 6.4 G/DL
RBC # BLD: 4.8 M/UL
RBC # FLD: 14.3 %
SODIUM SERPL-SCNC: 144 MMOL/L
T3 SERPL-MCNC: 108 NG/DL
T3RU NFR SERPL: 0.9 TBI
T4 SERPL-MCNC: 10 UG/DL
TRIGL SERPL-MCNC: 110 MG/DL
TSH SERPL-ACNC: 0.02 UIU/ML
WBC # FLD AUTO: 7.92 K/UL

## 2021-11-08 ENCOUNTER — NON-APPOINTMENT (OUTPATIENT)
Age: 81
End: 2021-11-08

## 2021-11-08 LAB
APPEARANCE: CLEAR
BACTERIA: NEGATIVE
BILIRUBIN URINE: NEGATIVE
BLOOD URINE: NORMAL
COLOR: YELLOW
GLUCOSE QUALITATIVE U: ABNORMAL
HYALINE CASTS: 2 /LPF
KETONES URINE: NEGATIVE
LEUKOCYTE ESTERASE URINE: NEGATIVE
MICROSCOPIC-UA: NORMAL
NITRITE URINE: NEGATIVE
PH URINE: 6.5
PROTEIN URINE: NORMAL
RED BLOOD CELLS URINE: 2 /HPF
SPECIFIC GRAVITY URINE: 1.03
SQUAMOUS EPITHELIAL CELLS: 2 /HPF
UROBILINOGEN URINE: NORMAL
WHITE BLOOD CELLS URINE: 1 /HPF

## 2021-11-09 ENCOUNTER — APPOINTMENT (OUTPATIENT)
Dept: CARDIOLOGY | Facility: CLINIC | Age: 81
End: 2021-11-09
Payer: MEDICARE

## 2021-11-09 PROCEDURE — 93978 VASCULAR STUDY: CPT

## 2021-11-09 PROCEDURE — 93880 EXTRACRANIAL BILAT STUDY: CPT

## 2021-11-09 RX ORDER — LEVOTHYROXINE SODIUM 0.17 MG/1
175 TABLET ORAL WEEKLY
Refills: 0 | Status: DISCONTINUED | COMMUNITY
End: 2021-11-09

## 2021-11-17 ENCOUNTER — APPOINTMENT (OUTPATIENT)
Dept: CARDIOLOGY | Facility: CLINIC | Age: 81
End: 2021-11-17
Payer: MEDICARE

## 2021-11-17 ENCOUNTER — NON-APPOINTMENT (OUTPATIENT)
Age: 81
End: 2021-11-17

## 2021-11-17 VITALS
HEIGHT: 65 IN | OXYGEN SATURATION: 93 % | WEIGHT: 243 LBS | SYSTOLIC BLOOD PRESSURE: 157 MMHG | DIASTOLIC BLOOD PRESSURE: 71 MMHG | BODY MASS INDEX: 40.48 KG/M2 | HEART RATE: 62 BPM

## 2021-11-17 VITALS — DIASTOLIC BLOOD PRESSURE: 72 MMHG | SYSTOLIC BLOOD PRESSURE: 150 MMHG

## 2021-11-17 PROCEDURE — 99214 OFFICE O/P EST MOD 30 MIN: CPT

## 2021-11-17 PROCEDURE — 93000 ELECTROCARDIOGRAM COMPLETE: CPT

## 2021-11-17 RX ORDER — ASPIRIN 325 MG/1
325 TABLET, FILM COATED ORAL DAILY
Refills: 0 | Status: DISCONTINUED | COMMUNITY
End: 2021-11-17

## 2021-11-17 NOTE — CARDIOLOGY SUMMARY
[de-identified] : 11/17/21.  Sinus  Bradycardia. Left axis -anterior fascicular block.  Cannot exclude old septal infarct.  [de-identified] : 9/21/21.  Normal left ventricular systolic function with estimated ejection fraction 60%. Mild concentric LVH. Normal right ventricular size and function. Mild left atrial enlargement. Moderate mitral regurgitation. Mild to moderate tricuspid regurgitation. [de-identified] : 5/31/18.  Normal coronary arteries. Moderate pulmonary hypertension. Elevated pulmonary capillary wedge pressure.

## 2021-11-17 NOTE — REVIEW OF SYSTEMS
[Feeling Fatigued] : not feeling fatigued [Weight Loss (___ Lbs)] : [unfilled] ~Ulb weight loss [Dyspnea on exertion] : not dyspnea during exertion [Chest Discomfort] : no chest discomfort [Lower Ext Edema] : no extremity edema [Palpitations] : no palpitations

## 2021-11-17 NOTE — PHYSICAL EXAM
[Obese] : obese [Normal S1, S2] : normal S1, S2 [Clear Lung Fields] : clear lung fields [Soft] : abdomen soft [No Edema] : no edema [Alert and Oriented] : alert and oriented [de-identified] : Wearing a face mask [de-identified] : systolic murmur [de-identified] : Central obesity

## 2021-11-17 NOTE — HISTORY OF PRESENT ILLNESS
[FreeTextEntry1] : Julieta Lara is an 80 year old woman with a history of hypertension, hypercholesterolemia, diabetes mellitus, obesity, pulmonary hypertension, diastolic heart failure, mitral and tricuspid valve regurgitation, anxiety, osteoarthritis, who was admitted to [facility] in September 2021 with dyspnea.  She was found to be hypoxic and chest CT described the presence of pulmonary edema and bilateral pleural effusions. She was treated with intravenous diuretic for possible diastolic heart failure and clinically improved but required supplemental oxygen upon discharge. She was recently evaluated by Dr. House and there is no significant obstructive sleep apnea.  She denies dyspnea; continues to lose weight; no edema.

## 2021-11-17 NOTE — DISCUSSION/SUMMARY
[___ Month(s)] : in [unfilled] month(s) [FreeTextEntry1] : \par Chronic diastolic heart failure: Data from most recent hospitalization was consistent with an exacerbation of probably chronic diastolic heart failure -- a right heart catheterization several years ago revealed the presence of elevated pulmonary capillary wedge pressure; clinically improving; continue diuresis with furosemide 40 mg once daily.\par \par Hypertension: Blood pressure moderately elevated today but other recent measurements in her electronic medical record or lower; continue lisinopril 40 mg daily -- Will reevaluate blood pressure in approximately 6-8 weeks and if persistently elevated I will titrate medications further.\par \par Pulmonary hypertension: Mild; no significant obstructive sleep apnea; she continues to use supplemental oxygen at night.\par \par Mitral regurgitation: Moderate; no intervention at this time other than diuresis and optimization of blood pressure.

## 2021-11-18 ENCOUNTER — NON-APPOINTMENT (OUTPATIENT)
Age: 81
End: 2021-11-18

## 2021-11-30 ENCOUNTER — APPOINTMENT (OUTPATIENT)
Dept: INTERNAL MEDICINE | Facility: CLINIC | Age: 81
End: 2021-11-30

## 2021-12-01 LAB — SARS-COV-2 N GENE NPH QL NAA+PROBE: NOT DETECTED

## 2021-12-03 ENCOUNTER — APPOINTMENT (OUTPATIENT)
Dept: INTERNAL MEDICINE | Facility: CLINIC | Age: 81
End: 2021-12-03
Payer: MEDICARE

## 2021-12-03 VITALS
HEART RATE: 68 BPM | DIASTOLIC BLOOD PRESSURE: 80 MMHG | OXYGEN SATURATION: 96 % | SYSTOLIC BLOOD PRESSURE: 158 MMHG | TEMPERATURE: 97.6 F | BODY MASS INDEX: 44.16 KG/M2 | WEIGHT: 240 LBS | RESPIRATION RATE: 16 BRPM | HEIGHT: 62 IN

## 2021-12-03 DIAGNOSIS — Z23 ENCOUNTER FOR IMMUNIZATION: ICD-10-CM

## 2021-12-03 PROCEDURE — 94729 DIFFUSING CAPACITY: CPT

## 2021-12-03 PROCEDURE — 94010 BREATHING CAPACITY TEST: CPT

## 2021-12-03 PROCEDURE — 99214 OFFICE O/P EST MOD 30 MIN: CPT | Mod: 25

## 2021-12-03 PROCEDURE — 90662 IIV NO PRSV INCREASED AG IM: CPT

## 2021-12-03 PROCEDURE — 94727 GAS DIL/WSHOT DETER LNG VOL: CPT

## 2021-12-03 PROCEDURE — ZZZZZ: CPT

## 2021-12-03 PROCEDURE — G0008: CPT

## 2022-01-14 LAB
SARS-COV-2 N GENE NPH QL NAA+PROBE: NOT DETECTED
TSH SERPL-ACNC: 2.93 UIU/ML

## 2022-01-19 ENCOUNTER — NON-APPOINTMENT (OUTPATIENT)
Age: 82
End: 2022-01-19

## 2022-01-19 ENCOUNTER — APPOINTMENT (OUTPATIENT)
Dept: CARDIOLOGY | Facility: CLINIC | Age: 82
End: 2022-01-19
Payer: MEDICARE

## 2022-01-19 VITALS
OXYGEN SATURATION: 96 % | BODY MASS INDEX: 45.08 KG/M2 | DIASTOLIC BLOOD PRESSURE: 68 MMHG | WEIGHT: 245 LBS | SYSTOLIC BLOOD PRESSURE: 142 MMHG | HEIGHT: 62 IN | HEART RATE: 56 BPM

## 2022-01-19 VITALS — SYSTOLIC BLOOD PRESSURE: 150 MMHG | DIASTOLIC BLOOD PRESSURE: 72 MMHG

## 2022-01-19 PROCEDURE — 99214 OFFICE O/P EST MOD 30 MIN: CPT

## 2022-01-19 PROCEDURE — 93000 ELECTROCARDIOGRAM COMPLETE: CPT

## 2022-01-19 NOTE — DISCUSSION/SUMMARY
[___ Month(s)] : in [unfilled] month(s) [FreeTextEntry1] : \par Hypertension: Suboptimally controlled -- high during today's encounter and also recent visits with other physicians; continue lisinopril and Bystolic; add HCTZ 25 mg daily.\par \par Chronic diastolic heart failure: Appears euvolemic (no edema or symptoms suggestive of exacerbation); she is rarely using furosemide (and doesn't like taking it because of frequent urination) -- will start HCTZ predominately for optimization of blood pressure (although it may also help control volume setting of pulmonary hypertension).\par \par Abnormal ECG: Today's electrocardiogram is similar to previous tracing.\par \par Pulmonary hypertension: Mild; no significant obstructive sleep apnea; she continues to use supplemental oxygen at night.\par \par Mitral regurgitation: Moderate; tried to optimize blood pressure and prevent hypervolemia.\par

## 2022-01-19 NOTE — REVIEW OF SYSTEMS
[Feeling Fatigued] : not feeling fatigued [Weight Loss (___ Lbs)] : no recent weight loss [SOB] : no shortness of breath [Dyspnea on exertion] : not dyspnea during exertion [Chest Discomfort] : no chest discomfort [Lower Ext Edema] : no extremity edema [Palpitations] : no palpitations [Cough] : no cough

## 2022-01-19 NOTE — CARDIOLOGY SUMMARY
[de-identified] : 1/19/2022.  Sinus  Bradycardia,  Left axis -anterior fascicular block,  Voltage criteria for LVH, Old anteroseptal infarct. \par  [de-identified] : 9/21/21.  Normal left ventricular systolic function with estimated ejection fraction 60%. Mild concentric LVH. Normal right ventricular size and function. Mild left atrial enlargement. Moderate mitral regurgitation. Mild to moderate tricuspid regurgitation. [de-identified] : 5/31/18.  Normal coronary arteries. Moderate pulmonary hypertension. Elevated pulmonary capillary wedge pressure.

## 2022-01-19 NOTE — PHYSICAL EXAM
[Obese] : obese [Normal S1, S2] : normal S1, S2 [Clear Lung Fields] : clear lung fields [Soft] : abdomen soft [No Edema] : no edema [Alert and Oriented] : alert and oriented [No Rash] : no rash [Normal Speech] : normal speech [de-identified] : Wearing a face mask [de-identified] : systolic murmur [de-identified] : Central obesity

## 2022-01-19 NOTE — HISTORY OF PRESENT ILLNESS
[FreeTextEntry1] : Julieta Lara is an 81 year old woman with a history of hypertension, hypercholesterolemia, diabetes mellitus, obesity, pulmonary hypertension, diastolic heart failure, mitral and tricuspid valve regurgitation, anxiety, osteoarthritis, who was admitted to [facility] in September 2021 with dyspnea. She was found to be hypoxic and chest CT described the presence of pulmonary edema and bilateral pleural effusions. She was treated with intravenous diuretic for possible diastolic heart failure and clinically improved but required supplemental oxygen for nocturnal hypoxemia upon discharge. She appeared well during our last encounter in mid-November 2021 but blood pressure was moderately elevated; she was treated with continued diuresis (furosemide 40 mg daily).  She now uses furosemide p.r.n. weight gain or edema -- estimates at one or 2 doses per month (tells me she hasn't needed to take it much); she is now using submental oxygen exclusively at night.  She feels well -- no new complaint; breathing comfortably; no chest discomfort or orthopnea.

## 2022-03-16 ENCOUNTER — RX RENEWAL (OUTPATIENT)
Age: 82
End: 2022-03-16

## 2022-04-14 ENCOUNTER — LABORATORY RESULT (OUTPATIENT)
Age: 82
End: 2022-04-14

## 2022-04-14 ENCOUNTER — RX RENEWAL (OUTPATIENT)
Age: 82
End: 2022-04-14

## 2022-04-19 ENCOUNTER — LABORATORY RESULT (OUTPATIENT)
Age: 82
End: 2022-04-19

## 2022-04-20 ENCOUNTER — APPOINTMENT (OUTPATIENT)
Dept: CARDIOLOGY | Facility: CLINIC | Age: 82
End: 2022-04-20
Payer: MEDICARE

## 2022-04-20 ENCOUNTER — NON-APPOINTMENT (OUTPATIENT)
Age: 82
End: 2022-04-20

## 2022-04-20 VITALS
BODY MASS INDEX: 44.72 KG/M2 | OXYGEN SATURATION: 91 % | WEIGHT: 243 LBS | HEIGHT: 62 IN | HEART RATE: 66 BPM | DIASTOLIC BLOOD PRESSURE: 76 MMHG | SYSTOLIC BLOOD PRESSURE: 173 MMHG

## 2022-04-20 VITALS
HEIGHT: 62 IN | BODY MASS INDEX: 44.72 KG/M2 | DIASTOLIC BLOOD PRESSURE: 76 MMHG | SYSTOLIC BLOOD PRESSURE: 173 MMHG | HEART RATE: 66 BPM | WEIGHT: 243 LBS

## 2022-04-20 VITALS — SYSTOLIC BLOOD PRESSURE: 150 MMHG | DIASTOLIC BLOOD PRESSURE: 80 MMHG

## 2022-04-20 PROCEDURE — 93000 ELECTROCARDIOGRAM COMPLETE: CPT

## 2022-04-20 PROCEDURE — 99214 OFFICE O/P EST MOD 30 MIN: CPT

## 2022-04-20 RX ORDER — FUROSEMIDE 40 MG/1
40 TABLET ORAL DAILY
Refills: 0 | Status: DISCONTINUED | COMMUNITY
End: 2022-04-20

## 2022-04-20 RX ORDER — ASPIRIN 81 MG
81 TABLET, DELAYED RELEASE (ENTERIC COATED) ORAL
Refills: 1 | Status: DISCONTINUED | COMMUNITY
End: 2022-04-20

## 2022-04-20 NOTE — HISTORY OF PRESENT ILLNESS
[FreeTextEntry1] : Julieta Lara is an 81 year old woman with a history of hypertension, hypercholesterolemia, diabetes mellitus, obesity, pulmonary hypertension, diastolic heart failure, mitral and tricuspid valve regurgitation, anxiety, and September 2021 hospitalization for pulmonary edema and bilateral pleural effusions who returns for cardiac examination - our last encounter was in January; at that time I prescribed HCTZ 25 mg daily because of elevated blood pressure.  She continues to feel well - no complaints during today's visit; specifically, no angina.  She describes a fair functional status -- no regular exercise regimen but keeps busy with activities of daily living and babysitting her granddaughter.

## 2022-04-20 NOTE — PHYSICAL EXAM
[Obese] : obese [Normal S1, S2] : normal S1, S2 [Clear Lung Fields] : clear lung fields [No Edema] : no edema [No Rash] : no rash [Alert and Oriented] : alert and oriented [Normal Bowel Sounds] : normal bowel sounds [Moves all extremities] : moves all extremities [de-identified] : Wearing a face mask [de-identified] : systolic murmur

## 2022-04-20 NOTE — DISCUSSION/SUMMARY
[___ Week(s)] : in [unfilled] week(s) [FreeTextEntry1] : \par Hypertension: Persistently elevated blood pressure; continue lisinopril, HCTZ, Bystolic; add amlodipine 5mg daily.\par \par Chronic diastolic heart failure: Stable; does not appear volume overloaded; continue HCTZ; will try to optimize blood pressure with amlodipine.\par \par Abnormal ECG: Today's electrocardiogram is similar to previous tracing -- I told her that it does suggest the presence of an old infarct (septal); however, she had a coronary angiogram several years ago with normal coronary arteries and has not been experiencing angina; I recommend continued observation --she will contact me if any exertional symptoms develop.\par \par Pulmonary hypertension: Mild; no significant obstructive sleep apnea; she continues to use supplemental oxygen at night.\par \par Mitral regurgitation: Moderate; observation recommended at this time.\par

## 2022-04-20 NOTE — CARDIOLOGY SUMMARY
[de-identified] : 4/20/22.  Sinus  Bradycardia \par  Left axis -anterior fascicular block. \par  Voltage criteria for LVH  (R(aVL) exceeds 1.01 mV)\par  Nonspecific QRS widening. \par  Septal infarct.  [de-identified] : 9/21/21.  Normal left ventricular systolic function with estimated ejection fraction 60%. Mild concentric LVH. Normal right ventricular size and function. Mild left atrial enlargement. Moderate mitral regurgitation. Mild to moderate tricuspid regurgitation. [de-identified] : 5/31/18.  Normal coronary arteries. Moderate pulmonary hypertension. Elevated pulmonary capillary wedge pressure.

## 2022-04-26 ENCOUNTER — RX RENEWAL (OUTPATIENT)
Age: 82
End: 2022-04-26

## 2022-05-10 ENCOUNTER — APPOINTMENT (OUTPATIENT)
Dept: UROLOGY | Facility: CLINIC | Age: 82
End: 2022-05-10
Payer: MEDICARE

## 2022-05-10 VITALS
SYSTOLIC BLOOD PRESSURE: 157 MMHG | DIASTOLIC BLOOD PRESSURE: 69 MMHG | OXYGEN SATURATION: 90 % | HEART RATE: 67 BPM | BODY MASS INDEX: 39.82 KG/M2 | HEIGHT: 65 IN | WEIGHT: 239 LBS

## 2022-05-10 PROCEDURE — 99214 OFFICE O/P EST MOD 30 MIN: CPT

## 2022-05-10 NOTE — ASSESSMENT
[FreeTextEntry1] : Pt having breakthrough with urinary frequency. However, this may be due to increase in blood sugar.

## 2022-05-10 NOTE — END OF VISIT
[FreeTextEntry3] : I ordered a fasting blood sugar and hemoglobin A1C to see whether or not diabetes is an issue. She will sample Toviaz 8 and Myrbetriq 50mg per day and follow up with a cystourethroscopy.

## 2022-05-10 NOTE — HISTORY OF PRESENT ILLNESS
[FreeTextEntry1] : 81F presents today with a CC of increased urinary frequency. Pt's most recent urine showed a high level of sugar, but this was several months ago.

## 2022-05-20 ENCOUNTER — APPOINTMENT (OUTPATIENT)
Dept: CARDIOLOGY | Facility: CLINIC | Age: 82
End: 2022-05-20

## 2022-05-23 RX ORDER — FESOTERODINE FUMARATE 8 MG/1
8 TABLET, FILM COATED, EXTENDED RELEASE ORAL
Qty: 90 | Refills: 3 | Status: ACTIVE | COMMUNITY
Start: 2022-05-10 | End: 1900-01-01

## 2022-05-24 ENCOUNTER — APPOINTMENT (OUTPATIENT)
Dept: UROLOGY | Facility: CLINIC | Age: 82
End: 2022-05-24

## 2022-06-09 ENCOUNTER — RX RENEWAL (OUTPATIENT)
Age: 82
End: 2022-06-09

## 2022-06-22 ENCOUNTER — RX RENEWAL (OUTPATIENT)
Age: 82
End: 2022-06-22

## 2022-06-22 LAB — TSH SERPL-ACNC: 0.04 UIU/ML

## 2022-06-22 NOTE — PROCEDURE
[FreeTextEntry1] : complete pulmonary function tests show normal spirometry, lung volumes and flow volume loop. Diffusing capacity is mildly impaired at 73% predicted.

## 2022-06-22 NOTE — DISCUSSION/SUMMARY
[FreeTextEntry1] : Mrs. Lara is an 81-year-old female presents for followup evaluation. She has nocturnal hypoxemia. Positive left examination shows no significant obstructive sleep apnea. She does have mild pulmonary hypertension. Complete pulmonary function tests show no evidence of significant obstructive or restrictive lung disease. Diffusing capacity is mildly impaired which is consistent with her mild pulmonary hypertension. She will continue on nocturnal oxygen supplementation. Patient does not require CPAP. She will also receive the high dose flu vaccine and followup in 6 months.

## 2022-06-22 NOTE — HISTORY OF PRESENT ILLNESS
[TextBox_4] : Mrs. Lara presents for a followup evaluation. She has a history of nocturnal hypoxemia and is on O2 2 L per minute nasal cannula while sleeping. Polysomnography examination demonstrated no significant obstructive sleep apnea. Chest x-ray performed in September 2021 showed no infiltrates, effusions or masses. Ms. Lara does have some shortness of breath with exertion and occasional fatigue during the day.

## 2022-06-22 NOTE — REASON FOR VISIT
[Follow-Up] : a follow-up visit [Shortness of Breath] : shortness of breath [TextBox_44] : nocturnal hypoxemia

## 2022-06-26 LAB
APPEARANCE: CLEAR
BACTERIA UR CULT: ABNORMAL
BACTERIA: ABNORMAL
BILIRUBIN URINE: NEGATIVE
BLOOD URINE: ABNORMAL
COLOR: YELLOW
GLUCOSE BS SERPL-MCNC: 178 MG/DL
GLUCOSE QUALITATIVE U: ABNORMAL
HYALINE CASTS: 4 /LPF
KETONES URINE: NEGATIVE
LEUKOCYTE ESTERASE URINE: NEGATIVE
MICROSCOPIC-UA: NORMAL
NITRITE URINE: NEGATIVE
PH URINE: 6
PROTEIN URINE: NORMAL
RED BLOOD CELLS URINE: 8 /HPF
SPECIFIC GRAVITY URINE: 1.02
SQUAMOUS EPITHELIAL CELLS: 3 /HPF
UROBILINOGEN URINE: NORMAL
WHITE BLOOD CELLS URINE: 7 /HPF

## 2022-06-30 ENCOUNTER — APPOINTMENT (OUTPATIENT)
Dept: INTERNAL MEDICINE | Facility: CLINIC | Age: 82
End: 2022-06-30

## 2022-06-30 VITALS
SYSTOLIC BLOOD PRESSURE: 128 MMHG | HEIGHT: 65 IN | HEART RATE: 63 BPM | BODY MASS INDEX: 40.15 KG/M2 | TEMPERATURE: 98.2 F | DIASTOLIC BLOOD PRESSURE: 60 MMHG | OXYGEN SATURATION: 96 % | RESPIRATION RATE: 16 BRPM | WEIGHT: 241 LBS

## 2022-06-30 DIAGNOSIS — Z00.00 ENCOUNTER FOR GENERAL ADULT MEDICAL EXAMINATION W/OUT ABNORMAL FINDINGS: ICD-10-CM

## 2022-06-30 PROCEDURE — 99214 OFFICE O/P EST MOD 30 MIN: CPT

## 2022-06-30 RX ORDER — FESOTERODINE FUMARATE 4 MG/1
4 TABLET, FILM COATED, EXTENDED RELEASE ORAL
Qty: 90 | Refills: 3 | Status: DISCONTINUED | COMMUNITY
Start: 2021-08-31 | End: 2022-06-30

## 2022-06-30 NOTE — PLAN
[FreeTextEntry1] : Mrs. Lara presents for a followup evaluation.\par \par Number one. She will continue her current medication regimen which has been reviewed and revised.\par \par #2. Patient has already had comprehensive blood profile drawn. Results are pending.\par \par #3. Patient will receive the Prevnar 20 and a pneumonia vaccine.\par \par #4. Followup in 6 months.

## 2022-06-30 NOTE — HISTORY OF PRESENT ILLNESS
[FreeTextEntry1] : followup [de-identified] : Ms. Lara is an 81-year-old lady with a history of type 2 diabetes, hypertension, hypothyroidism, pulmonary hypertension, diastolic heart failure and mitral/tricuspid regurgitation. She presents for followup evaluation. Patient is feeling well. She denies any chest pains, palpitations or shortness of breath. She continues on oxygen at night for nocturnal hypoxemia. Patient did have a polysomnography examination which demonstrated no obstructive sleep apnea, however, patient did have at nighttime hypoxemia.

## 2022-07-06 ENCOUNTER — APPOINTMENT (OUTPATIENT)
Dept: INTERNAL MEDICINE | Facility: CLINIC | Age: 82
End: 2022-07-06

## 2022-07-06 PROCEDURE — 90677 PCV20 VACCINE IM: CPT

## 2022-07-06 PROCEDURE — G0009: CPT

## 2022-07-19 ENCOUNTER — RX RENEWAL (OUTPATIENT)
Age: 82
End: 2022-07-19

## 2022-07-25 ENCOUNTER — RX RENEWAL (OUTPATIENT)
Age: 82
End: 2022-07-25

## 2022-07-28 ENCOUNTER — RX RENEWAL (OUTPATIENT)
Age: 82
End: 2022-07-28

## 2022-08-05 ENCOUNTER — APPOINTMENT (OUTPATIENT)
Dept: UROLOGY | Facility: CLINIC | Age: 82
End: 2022-08-05

## 2022-08-05 VITALS
HEIGHT: 65 IN | HEART RATE: 66 BPM | DIASTOLIC BLOOD PRESSURE: 74 MMHG | WEIGHT: 243 LBS | SYSTOLIC BLOOD PRESSURE: 159 MMHG | BODY MASS INDEX: 40.48 KG/M2 | OXYGEN SATURATION: 91 %

## 2022-08-05 DIAGNOSIS — R39.15 URGENCY OF URINATION: ICD-10-CM

## 2022-08-05 PROCEDURE — 51798 US URINE CAPACITY MEASURE: CPT

## 2022-08-05 PROCEDURE — 99214 OFFICE O/P EST MOD 30 MIN: CPT | Mod: 25

## 2022-08-05 NOTE — HISTORY OF PRESENT ILLNESS
[FreeTextEntry1] : 81F presents today with a CC of urinary incontinence. Pt notes almost complete incontinence. She had a UTI 1 month ago. She is on anticholinergic medications at this time.

## 2022-08-05 NOTE — END OF VISIT
[FreeTextEntry3] : She is placed on Nitrofurantoin 100mg TID with meals each day for 1 week. Urine is sent. She will follow up with cystourethroscopy in 2 weeks.

## 2022-08-09 ENCOUNTER — RX RENEWAL (OUTPATIENT)
Age: 82
End: 2022-08-09

## 2022-08-12 LAB
APPEARANCE: CLEAR
BACTERIA: NEGATIVE
BILIRUBIN URINE: NEGATIVE
BLOOD URINE: NEGATIVE
COLOR: YELLOW
GLUCOSE QUALITATIVE U: ABNORMAL
HYALINE CASTS: 0 /LPF
KETONES URINE: NEGATIVE
LEUKOCYTE ESTERASE URINE: NEGATIVE
MICROSCOPIC-UA: NORMAL
NITRITE URINE: NEGATIVE
PH URINE: 6.5
PROTEIN URINE: NEGATIVE
RED BLOOD CELLS URINE: 5 /HPF
SPECIFIC GRAVITY URINE: 1.02
SQUAMOUS EPITHELIAL CELLS: 0 /HPF
UROBILINOGEN URINE: NORMAL
WHITE BLOOD CELLS URINE: 1 /HPF

## 2022-08-13 LAB — BACTERIA UR CULT: NORMAL

## 2022-08-17 LAB — URINE CYTOLOGY: NORMAL

## 2022-08-26 ENCOUNTER — NON-APPOINTMENT (OUTPATIENT)
Age: 82
End: 2022-08-26

## 2022-08-26 ENCOUNTER — APPOINTMENT (OUTPATIENT)
Dept: CARDIOLOGY | Facility: CLINIC | Age: 82
End: 2022-08-26

## 2022-08-26 VITALS
HEART RATE: 62 BPM | WEIGHT: 246 LBS | HEIGHT: 65 IN | BODY MASS INDEX: 40.98 KG/M2 | SYSTOLIC BLOOD PRESSURE: 140 MMHG | OXYGEN SATURATION: 94 % | DIASTOLIC BLOOD PRESSURE: 70 MMHG

## 2022-08-26 PROCEDURE — 99214 OFFICE O/P EST MOD 30 MIN: CPT

## 2022-08-26 PROCEDURE — 93000 ELECTROCARDIOGRAM COMPLETE: CPT

## 2022-08-26 NOTE — REVIEW OF SYSTEMS
[Weight Gain (___ Lbs)] : [unfilled] ~Ulb weight gain [Feeling Fatigued] : not feeling fatigued [SOB] : no shortness of breath [Dyspnea on exertion] : not dyspnea during exertion [Chest Discomfort] : no chest discomfort [Lower Ext Edema] : no extremity edema [Palpitations] : no palpitations [Cough] : no cough [FreeTextEntry5] : SEE HPI

## 2022-08-26 NOTE — CARDIOLOGY SUMMARY
[de-identified] : 8/26/2022.  Sinus  Rhythm with occasional PAC. Left axis -anterior fascicular block.  Poor R-wave progression [de-identified] : 9/21/21.  Normal left ventricular systolic function with estimated ejection fraction 60%. Mild concentric LVH. Normal right ventricular size and function. Mild left atrial enlargement. Moderate mitral regurgitation. Mild to moderate tricuspid regurgitation. [de-identified] : 5/31/18.  Normal coronary arteries. Moderate pulmonary hypertension. Elevated pulmonary capillary wedge pressure.

## 2022-08-26 NOTE — DISCUSSION/SUMMARY
[___ Month(s)] : in [unfilled] month(s) [FreeTextEntry1] : \par Chronic diastolic heart failure: Stable;   We discussed intermittent  substitution of HCTZ with furosemide if edema were to worsen.\par \par Hypertension: Improved; continue lisinopril, HCTZ, Bystolic, and amlodipine.\par \par Pulmonary hypertension: Mild; stable;  anticipate  occasional imaging (echocardiogram) to assess pulmonary pressures\par \par Mitral regurgitation: Moderate in severity /  observation most appropriate at this time.\par

## 2022-08-26 NOTE — HISTORY OF PRESENT ILLNESS
[FreeTextEntry1] : Julieta Lara is an 81-year-old woman with a history of hypertension, hypercholesterolemia, diabetes mellitus, obesity, pulmonary hypertension, diastolic heart failure, mitral and tricuspid valve regurgitation, anxiety, and September 2021 hospitalization for pulmonary edema and bilateral pleural effusions who returns for cardiac examination.  Her blood pressure was markedly elevated when I saw her in April and I prescribed amlodipine 5 mg daily and asked her to return to see me several weeks later but she did not return until today --however she met with Dr. House on June 30 and blood pressure was much lower.  She describes stress related to.  She describes stress related to her 's illness (CVA in May)  but has been feeling well; occasional pedal edema, often at the end of the day but no dyspnea, palpitations, orthopnea, or angina.  She continues to consume a low-sodium diet.

## 2022-08-26 NOTE — PHYSICAL EXAM
[Obese] : obese [Normal S1, S2] : normal S1, S2 [Clear Lung Fields] : clear lung fields [Normal Bowel Sounds] : normal bowel sounds [Alert and Oriented] : alert and oriented [No Acute Distress] : no acute distress [Normal Speech] : normal speech [de-identified] : Wearing a face mask [de-identified] : Central obesity [de-identified] : Trace pedal edema

## 2022-09-15 ENCOUNTER — APPOINTMENT (OUTPATIENT)
Dept: UROLOGY | Facility: CLINIC | Age: 82
End: 2022-09-15

## 2022-09-15 VITALS
BODY MASS INDEX: 40.48 KG/M2 | HEIGHT: 65 IN | HEART RATE: 68 BPM | SYSTOLIC BLOOD PRESSURE: 141 MMHG | DIASTOLIC BLOOD PRESSURE: 70 MMHG | WEIGHT: 243 LBS | OXYGEN SATURATION: 92 %

## 2022-09-15 PROCEDURE — 52285 CYSTOSCOPY AND TREATMENT: CPT

## 2022-10-10 ENCOUNTER — RX RENEWAL (OUTPATIENT)
Age: 82
End: 2022-10-10

## 2022-10-11 ENCOUNTER — RX RENEWAL (OUTPATIENT)
Age: 82
End: 2022-10-11

## 2022-10-20 ENCOUNTER — APPOINTMENT (OUTPATIENT)
Dept: UROLOGY | Facility: CLINIC | Age: 82
End: 2022-10-20

## 2022-10-21 ENCOUNTER — APPOINTMENT (OUTPATIENT)
Dept: DERMATOLOGY | Facility: CLINIC | Age: 82
End: 2022-10-21

## 2022-10-21 DIAGNOSIS — L81.4 OTHER MELANIN HYPERPIGMENTATION: ICD-10-CM

## 2022-10-21 DIAGNOSIS — D22.9 MELANOCYTIC NEVI, UNSPECIFIED: ICD-10-CM

## 2022-10-21 DIAGNOSIS — D48.5 NEOPLASM OF UNCERTAIN BEHAVIOR OF SKIN: ICD-10-CM

## 2022-10-21 PROCEDURE — 11102 TANGNTL BX SKIN SINGLE LES: CPT

## 2022-10-21 PROCEDURE — 99203 OFFICE O/P NEW LOW 30 MIN: CPT | Mod: 25

## 2022-10-21 NOTE — HISTORY OF PRESENT ILLNESS
[FreeTextEntry1] : spot on chin [de-identified] : 82 year old female here with spot on left chin. present since summer. no tx tried.

## 2022-10-21 NOTE — PHYSICAL EXAM
[FreeTextEntry3] : AAOx3, pleasant, NAD, no visual lymphadenopathy\par hair, scalp, face, nose, eyelids, ears, lips, oropharynx, neck, chest, abdomen, back, right arm, left arm, nails, and hands examined with all normal findings,\par pertinent findings include:\par \par multiple benign nevi and lentigines\par pink nodule on left chin

## 2022-10-21 NOTE — ASSESSMENT
[FreeTextEntry1] : 1) benign findings as above- education\par \par 2) Shave bx location left chin\par diagnosis: r/o BCC\par  \par Shave biopsy performed today over above location, risks and benefits discussed including incomplete removal, not enough tissue for diagnosis scarring and infection, informed consent obtained, pictures taken,  cleaned with alcohol and anesthetized with 1%lido+epi, 0.3 cc total, hemostasis obtained with monsels, vaseline and bandaid placed, tolerated well, wound care reviewed, specimen sent to pathology.\par \par mohs if + for BCC

## 2022-10-27 ENCOUNTER — NON-APPOINTMENT (OUTPATIENT)
Age: 82
End: 2022-10-27

## 2022-11-04 LAB — CORE LAB BIOPSY: NORMAL

## 2022-11-09 ENCOUNTER — APPOINTMENT (OUTPATIENT)
Dept: UROLOGY | Facility: CLINIC | Age: 82
End: 2022-11-09

## 2022-11-09 VITALS
SYSTOLIC BLOOD PRESSURE: 154 MMHG | HEART RATE: 72 BPM | BODY MASS INDEX: 43.99 KG/M2 | WEIGHT: 264 LBS | OXYGEN SATURATION: 92 % | DIASTOLIC BLOOD PRESSURE: 72 MMHG | HEIGHT: 65 IN

## 2022-11-09 PROCEDURE — 51784 ANAL/URINARY MUSCLE STUDY: CPT

## 2022-11-09 PROCEDURE — 51797 INTRAABDOMINAL PRESSURE TEST: CPT

## 2022-11-09 PROCEDURE — 51728 CYSTOMETROGRAM W/VP: CPT

## 2022-11-09 PROCEDURE — 51741 ELECTRO-UROFLOWMETRY FIRST: CPT

## 2022-11-09 PROCEDURE — 51798 US URINE CAPACITY MEASURE: CPT | Mod: 59

## 2022-11-22 ENCOUNTER — RX RENEWAL (OUTPATIENT)
Age: 82
End: 2022-11-22

## 2022-11-22 RX ORDER — OXYBUTYNIN CHLORIDE 5 MG/1
5 TABLET ORAL
Qty: 60 | Refills: 5 | Status: ACTIVE | COMMUNITY
Start: 2022-11-22 | End: 1900-01-01

## 2022-11-22 RX ORDER — LEVOTHYROXINE SODIUM 0.15 MG/1
150 TABLET ORAL DAILY
Qty: 90 | Refills: 1 | Status: COMPLETED | COMMUNITY
Start: 2022-04-14 | End: 2022-11-22

## 2022-11-28 ENCOUNTER — RX RENEWAL (OUTPATIENT)
Age: 82
End: 2022-11-28

## 2022-12-29 ENCOUNTER — APPOINTMENT (OUTPATIENT)
Dept: INTERNAL MEDICINE | Facility: CLINIC | Age: 82
End: 2022-12-29
Payer: MEDICARE

## 2022-12-29 VITALS
HEIGHT: 62 IN | WEIGHT: 260 LBS | SYSTOLIC BLOOD PRESSURE: 151 MMHG | DIASTOLIC BLOOD PRESSURE: 83 MMHG | RESPIRATION RATE: 16 BRPM | OXYGEN SATURATION: 94 % | HEART RATE: 64 BPM | TEMPERATURE: 99 F | BODY MASS INDEX: 47.84 KG/M2

## 2022-12-29 DIAGNOSIS — R40.0 SOMNOLENCE: ICD-10-CM

## 2022-12-29 DIAGNOSIS — Z98.890 OTHER SPECIFIED POSTPROCEDURAL STATES: ICD-10-CM

## 2022-12-29 PROCEDURE — 90662 IIV NO PRSV INCREASED AG IM: CPT

## 2022-12-29 PROCEDURE — 94060 EVALUATION OF WHEEZING: CPT

## 2022-12-29 PROCEDURE — ZZZZZ: CPT

## 2022-12-29 PROCEDURE — 99214 OFFICE O/P EST MOD 30 MIN: CPT | Mod: 25

## 2022-12-29 PROCEDURE — G0008: CPT

## 2022-12-29 PROCEDURE — 94727 GAS DIL/WSHOT DETER LNG VOL: CPT

## 2022-12-29 PROCEDURE — 94729 DIFFUSING CAPACITY: CPT

## 2022-12-29 RX ORDER — BETHANECHOL CHLORIDE 25 MG/1
25 TABLET ORAL
Qty: 60 | Refills: 3 | Status: DISCONTINUED | COMMUNITY
Start: 2020-10-14 | End: 2022-12-29

## 2022-12-29 RX ORDER — LISINOPRIL 40 MG/1
40 TABLET ORAL DAILY
Qty: 90 | Refills: 0 | Status: DISCONTINUED | COMMUNITY
Start: 2022-07-19 | End: 2022-12-29

## 2022-12-29 RX ORDER — MULTIVIT-MIN/IRON/FOLIC ACID/K 18-600-40
50 MCG CAPSULE ORAL DAILY
Refills: 0 | Status: DISCONTINUED | COMMUNITY
End: 2022-12-29

## 2022-12-29 NOTE — PLAN
[FreeTextEntry1] : Mrs. Lara presents for follow-up evaluation.\par \par 1.  She will continue on current medication regimen which has been reviewed and revised.\par \par 2.  Prescription for CBC, CMP, hemoglobin A1c, iron level, lipid profile, TSH level and urinalysis.\par \par 3.  Patient will receive the high-dose flu vaccine.\par \par 4.  Patient is having some mild symptoms of depression since her  passed away.  She has a strong family support system.  I have recommended she consider speaking with a therapist/grief counselor.  She does not feel she is ready to do this yet.\par \par 5.  Follow-up in 6 months.

## 2022-12-29 NOTE — DATA REVIEWED
[FreeTextEntry1] : Complete pulmonary function test were performed.\par FEV1 is 1.39 L which is 80% predicted.  FVC is 1.85 L which is 81% predicted.  FEV1/FVC ratio is 75%.  PEF is 1.04 L/s which is 74% predicted.  PEF is 5.29 L/s which is 104% predicted.  There is no significant bronchodilator response.  Total lung capacity is 3.18 L which is 69% predicted.  Diffusing capacity is 68% predicted.

## 2022-12-29 NOTE — HISTORY OF PRESENT ILLNESS
[FreeTextEntry1] : Follow-up evaluation [de-identified] : Ms. Lara presents for follow-up evaluation.\par She denies any chest pain, shortness of breath or palpitations.\par Patient states she is feeling tired and fatigued and may be somewhat depressed.  Her  passed away 3 months ago.\par She has a strong family support system.\par Ms. Lara denies any suicidal ideation.\par Patient does get some shortness of breath with exertion.  Symptoms resolve with rest.

## 2023-02-21 ENCOUNTER — RX RENEWAL (OUTPATIENT)
Age: 83
End: 2023-02-21

## 2023-02-22 ENCOUNTER — APPOINTMENT (OUTPATIENT)
Dept: DERMATOLOGY | Facility: CLINIC | Age: 83
End: 2023-02-22

## 2023-02-24 ENCOUNTER — APPOINTMENT (OUTPATIENT)
Dept: INTERNAL MEDICINE | Facility: CLINIC | Age: 83
End: 2023-02-24

## 2023-03-01 ENCOUNTER — NON-APPOINTMENT (OUTPATIENT)
Age: 83
End: 2023-03-01

## 2023-03-01 ENCOUNTER — APPOINTMENT (OUTPATIENT)
Dept: CARDIOLOGY | Facility: CLINIC | Age: 83
End: 2023-03-01
Payer: MEDICARE

## 2023-03-01 VITALS
OXYGEN SATURATION: 94 % | HEART RATE: 65 BPM | HEIGHT: 62 IN | BODY MASS INDEX: 48.58 KG/M2 | SYSTOLIC BLOOD PRESSURE: 146 MMHG | DIASTOLIC BLOOD PRESSURE: 70 MMHG | WEIGHT: 264 LBS

## 2023-03-01 DIAGNOSIS — R94.31 ABNORMAL ELECTROCARDIOGRAM [ECG] [EKG]: ICD-10-CM

## 2023-03-01 PROCEDURE — 99214 OFFICE O/P EST MOD 30 MIN: CPT

## 2023-03-01 PROCEDURE — 93000 ELECTROCARDIOGRAM COMPLETE: CPT

## 2023-03-01 NOTE — DISCUSSION/SUMMARY
[___ Month(s)] : in [unfilled] month(s) [FreeTextEntry1] : \par Hypertension:   Suboptimal control; she stopped taking lisinopril at some point after our last encounter in August–unclear reason (may have been erroneous); continue present doses of Bystolic, amlodipine, and HCTZ; resume lisinopril–she was previously taking 40 mg daily but I will prescribe a lower dose (20 mg) since she is resuming therapy after a lapse in use.\par \par Chronic diastolic heart failure: Stable;  reevaluate dyspnea upon optimization of blood pressure; continue HCTZ and nebivolol.\par \par Abnormal ECG: Today's electrocardiogram is similar to previous tracing.\par \par Mitral regurgitation: Moderate; repeat TTE.

## 2023-03-01 NOTE — PHYSICAL EXAM
[Obese] : obese [Normal S1, S2] : normal S1, S2 [Clear Lung Fields] : clear lung fields [Normal Bowel Sounds] : normal bowel sounds [No Rash] : no rash [Alert and Oriented] : alert and oriented [No Acute Distress] : no acute distress [No Respiratory Distress] : no respiratory distress  [de-identified] : Wearing a face mask [de-identified] : systolic murmur [de-identified] : No crackles/wheeze [de-identified] : Mild pedal edema

## 2023-03-01 NOTE — REVIEW OF SYSTEMS
[SOB] : shortness of breath [Lower Ext Edema] : lower extremity edema [Weight Gain (___ Lbs)] : no recent weight gain [Feeling Fatigued] : not feeling fatigued [Weight Loss (___ Lbs)] : no recent weight loss [Chest Discomfort] : no chest discomfort [Palpitations] : no palpitations [Cough] : no cough

## 2023-03-01 NOTE — CARDIOLOGY SUMMARY
[de-identified] : 3/1/2023.  Sinus  Rhythm.  Left axis -anterior fascicular block.  Nonspecific IVCD.  Old septal infarct.  [de-identified] : 9/21/21.  Normal left ventricular systolic function with estimated ejection fraction 60%. Mild concentric LVH. Normal right ventricular size and function. Mild left atrial enlargement. Moderate mitral regurgitation. Mild to moderate tricuspid regurgitation. [de-identified] : 5/31/18.  Normal coronary arteries. Moderate pulmonary hypertension. Elevated pulmonary capillary wedge pressure.

## 2023-03-01 NOTE — HISTORY OF PRESENT ILLNESS
[FreeTextEntry1] : Julieta Lara is an 82-year-old woman with a history of hypertension, hypercholesterolemia, diabetes mellitus, obesity, pulmonary hypertension, diastolic heart failure, mitral and tricuspid valve regurgitation, anxiety, and September 2021 hospitalization for pulmonary edema and bilateral pleural effusions who returns for cardiac examination.  She describes  mild intermittent dyspnea with exertion but no weight gain or orthopnea.  Her  passed away in October from an intracranial hemorrhage–she says she is "doing okay."

## 2023-03-07 ENCOUNTER — APPOINTMENT (OUTPATIENT)
Dept: INTERNAL MEDICINE | Facility: CLINIC | Age: 83
End: 2023-03-07
Payer: MEDICARE

## 2023-03-07 VITALS
TEMPERATURE: 98.1 F | OXYGEN SATURATION: 88 % | RESPIRATION RATE: 18 BRPM | HEART RATE: 66 BPM | HEIGHT: 62 IN | BODY MASS INDEX: 48.58 KG/M2 | SYSTOLIC BLOOD PRESSURE: 150 MMHG | DIASTOLIC BLOOD PRESSURE: 80 MMHG | WEIGHT: 264 LBS

## 2023-03-07 VITALS — OXYGEN SATURATION: 96 %

## 2023-03-07 PROCEDURE — 99215 OFFICE O/P EST HI 40 MIN: CPT

## 2023-03-07 NOTE — PHYSICAL EXAM
[Normal Gait] : normal gait [Normal Affect] : the affect was normal [Alert and Oriented x3] : oriented to person, place, and time [de-identified] : B/L crackles at bases, L >R  [de-identified] : B/L +1-+2 pitting LE edema, L>R

## 2023-03-07 NOTE — PLAN
[FreeTextEntry1] : 1. Patient will hold HCTZ, start lasix 20mg x 1 week\par \par 2. Daily weights required- call if more than 3 lbs in 1 day of 5 lbs in 1 week\par \par 3. Patient must f/u with cardiology \par \par 4. 6 min walk test to assess hypoxemia on exertion in 1 week\par \par 5. BNP drawn today, will follow results \par \par 6. F/u with Dr. House 6/2023 or sooner if needed

## 2023-03-07 NOTE — HISTORY OF PRESENT ILLNESS
[FreeTextEntry1] : F/U [de-identified] : The patient is an 82-year- old female with PMH of CHF, DM2, HLD, HTN, hypothyroid, pulm HTN who presents to office today for follow up. The patient states she has been feeling SOB over the past 3 weeks. Denies recent illness or sick contacts. She has been experiencing dry cough. No wheezing. Dyspnea on exertion, hypoxic on arrival today, placed on 2L O2. Does not have o2 at home. No distress at time of visit

## 2023-03-08 ENCOUNTER — APPOINTMENT (OUTPATIENT)
Dept: CARDIOLOGY | Facility: CLINIC | Age: 83
End: 2023-03-08
Payer: MEDICARE

## 2023-03-08 VITALS
SYSTOLIC BLOOD PRESSURE: 148 MMHG | DIASTOLIC BLOOD PRESSURE: 72 MMHG | OXYGEN SATURATION: 91 % | BODY MASS INDEX: 49.13 KG/M2 | RESPIRATION RATE: 16 BRPM | HEIGHT: 62 IN | HEART RATE: 72 BPM | WEIGHT: 267 LBS

## 2023-03-08 VITALS
SYSTOLIC BLOOD PRESSURE: 148 MMHG | BODY MASS INDEX: 48.84 KG/M2 | DIASTOLIC BLOOD PRESSURE: 72 MMHG | HEART RATE: 72 BPM | WEIGHT: 267 LBS | OXYGEN SATURATION: 98 %

## 2023-03-08 PROCEDURE — 99214 OFFICE O/P EST MOD 30 MIN: CPT

## 2023-03-08 NOTE — CARDIOLOGY SUMMARY
[de-identified] : 3/1/2023.  Sinus  Rhythm.  Left axis -anterior fascicular block.  Nonspecific IVCD.  Old septal infarct.  [de-identified] : 9/21/21.  Normal left ventricular systolic function with estimated ejection fraction 60%. Mild concentric LVH. Normal right ventricular size and function. Mild left atrial enlargement. Moderate mitral regurgitation. Mild to moderate tricuspid regurgitation. [de-identified] : 5/31/18.  Normal coronary arteries. Moderate pulmonary hypertension. Elevated pulmonary capillary wedge pressure.

## 2023-03-08 NOTE — HISTORY OF PRESENT ILLNESS
[FreeTextEntry1] : Julieta Lara is an 82-year-old woman with a history of hypertension, hypercholesterolemia, diabetes mellitus, obesity, pulmonary hypertension, diastolic heart failure, mitral and tricuspid valve regurgitation, anxiety, and September 2021 hospitalization for pulmonary edema and bilateral pleural effusions who returns for cardiac examination.  When I saw her last week, she described mild dyspnea and her blood pressure was elevated after she erroneously seemed to have stopped taking lisinopril; I resumed therapy at a lower dose.  Due to persistent dyspnea, she was prescribed furosemide 20 mg once daily by Serenity Ron NP yesterday.  Labs revealed a normal proBNP (233; drawn on 3/7/23).\par \par She describes a few pounds of weight gain after being stable for quite some time.  She also has intermittent dyspnea–no clear exacerbating  factors but said she "felt better" after receiving supplemental oxygen in the pulmonologist office yesterday–scheduled for a 6-minute walk test next week.

## 2023-03-08 NOTE — DISCUSSION/SUMMARY
[___ Month(s)] : in [unfilled] month(s) [FreeTextEntry1] : \par Dyspnea: Normal proBNP suggest the absence of significant heart failure exacerbation–however, sometimes proBNP can be falsely low in the setting of obesity.  It is reasonable to continue to diurese with furosemide for the next 7 days to see if symptoms improve (especially given small weight gain and mild pedal edema);   She will be tested for supplemental oxygen candidacy with Dr. House next week.\par \par Hypertension:    blood pressure did not significantly improve with addition of lisinopril 20 mg daily last week–will uptitrate  dose to 40 mg daily.\par \par Mitral regurgitation: Moderate;  await repeat imaging with echocardiography to assess severity.

## 2023-03-08 NOTE — PHYSICAL EXAM
[No Acute Distress] : no acute distress [Obese] : obese [Normal S1, S2] : normal S1, S2 [Clear Lung Fields] : clear lung fields [No Respiratory Distress] : no respiratory distress  [Normal Bowel Sounds] : normal bowel sounds [No Rash] : no rash [Alert and Oriented] : alert and oriented [de-identified] : Wearing a face mask [de-identified] : systolic murmur [de-identified] : No crackles/wheeze [de-identified] : Mild pedal edema

## 2023-03-08 NOTE — REVIEW OF SYSTEMS
[SOB] : shortness of breath [Lower Ext Edema] : lower extremity edema [Weight Gain (___ Lbs)] : [unfilled] ~Ulb weight gain [Feeling Fatigued] : not feeling fatigued [Chest Discomfort] : no chest discomfort [Palpitations] : no palpitations [Cough] : no cough

## 2023-03-09 LAB
ALBUMIN SERPL ELPH-MCNC: 3.8 G/DL
ALP BLD-CCNC: 58 U/L
ALT SERPL-CCNC: 10 U/L
ANION GAP SERPL CALC-SCNC: 10 MMOL/L
AST SERPL-CCNC: 11 U/L
BILIRUB SERPL-MCNC: 0.5 MG/DL
BUN SERPL-MCNC: 14 MG/DL
CALCIUM SERPL-MCNC: 9.7 MG/DL
CHLORIDE SERPL-SCNC: 103 MMOL/L
CO2 SERPL-SCNC: 30 MMOL/L
CREAT SERPL-MCNC: 0.73 MG/DL
EGFR: 82 ML/MIN/1.73M2
GLUCOSE SERPL-MCNC: 147 MG/DL
NT-PROBNP SERPL-MCNC: 233 PG/ML
POTASSIUM SERPL-SCNC: 4.3 MMOL/L
PROT SERPL-MCNC: 6.6 G/DL
SODIUM SERPL-SCNC: 143 MMOL/L

## 2023-03-16 ENCOUNTER — APPOINTMENT (OUTPATIENT)
Dept: INTERNAL MEDICINE | Facility: CLINIC | Age: 83
End: 2023-03-16

## 2023-03-16 ENCOUNTER — NON-APPOINTMENT (OUTPATIENT)
Age: 83
End: 2023-03-16

## 2023-03-17 ENCOUNTER — EMERGENCY (EMERGENCY)
Facility: HOSPITAL | Age: 83
LOS: 0 days | Discharge: HOME | End: 2023-03-17
Attending: STUDENT IN AN ORGANIZED HEALTH CARE EDUCATION/TRAINING PROGRAM
Payer: MEDICARE

## 2023-03-17 VITALS
HEART RATE: 55 BPM | DIASTOLIC BLOOD PRESSURE: 50 MMHG | OXYGEN SATURATION: 95 % | RESPIRATION RATE: 18 BRPM | TEMPERATURE: 99 F | SYSTOLIC BLOOD PRESSURE: 147 MMHG

## 2023-03-17 VITALS — WEIGHT: 263.89 LBS | HEIGHT: 65 IN

## 2023-03-17 DIAGNOSIS — Z98.890 OTHER SPECIFIED POSTPROCEDURAL STATES: Chronic | ICD-10-CM

## 2023-03-17 DIAGNOSIS — I10 ESSENTIAL (PRIMARY) HYPERTENSION: ICD-10-CM
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
## 2023-03-17 DIAGNOSIS — Z41.9 ENCOUNTER FOR PROCEDURE FOR PURPOSES OTHER THAN REMEDYING HEALTH STATE, UNSPECIFIED: Chronic | ICD-10-CM

## 2023-03-17 DIAGNOSIS — M47.812 SPONDYLOSIS WITHOUT MYELOPATHY OR RADICULOPATHY, CERVICAL REGION: ICD-10-CM

## 2023-03-17 DIAGNOSIS — Z98.49 CATARACT EXTRACTION STATUS, UNSPECIFIED EYE: Chronic | ICD-10-CM

## 2023-03-17 DIAGNOSIS — E03.9 HYPOTHYROIDISM, UNSPECIFIED: ICD-10-CM
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
## 2023-03-17 DIAGNOSIS — S00.01XA ABRASION OF SCALP, INITIAL ENCOUNTER: ICD-10-CM

## 2023-03-17 DIAGNOSIS — M17.0 BILATERAL PRIMARY OSTEOARTHRITIS OF KNEE: ICD-10-CM

## 2023-03-17 DIAGNOSIS — F41.0 PANIC DISORDER [EPISODIC PAROXYSMAL ANXIETY]: ICD-10-CM

## 2023-03-17 DIAGNOSIS — Z79.82 LONG TERM (CURRENT) USE OF ASPIRIN: ICD-10-CM

## 2023-03-17 DIAGNOSIS — Z79.84 LONG TERM (CURRENT) USE OF ORAL HYPOGLYCEMIC DRUGS: ICD-10-CM

## 2023-03-17 DIAGNOSIS — Z90.89 ACQUIRED ABSENCE OF OTHER ORGANS: Chronic | ICD-10-CM

## 2023-03-17 DIAGNOSIS — S09.90XA UNSPECIFIED INJURY OF HEAD, INITIAL ENCOUNTER: ICD-10-CM

## 2023-03-17 DIAGNOSIS — E11.9 TYPE 2 DIABETES MELLITUS WITHOUT COMPLICATIONS: ICD-10-CM

## 2023-03-17 PROCEDURE — 72125 CT NECK SPINE W/O DYE: CPT | Mod: 26,MA

## 2023-03-17 PROCEDURE — 72125 CT NECK SPINE W/O DYE: CPT | Mod: MA

## 2023-03-17 PROCEDURE — 70450 CT HEAD/BRAIN W/O DYE: CPT | Mod: 26,MA

## 2023-03-17 PROCEDURE — 99284 EMERGENCY DEPT VISIT MOD MDM: CPT | Mod: 25

## 2023-03-17 PROCEDURE — 70450 CT HEAD/BRAIN W/O DYE: CPT | Mod: MA

## 2023-03-17 PROCEDURE — 99284 EMERGENCY DEPT VISIT MOD MDM: CPT | Mod: FS

## 2023-03-17 NOTE — ED STATDOCS - PATIENT PORTAL LINK FT
You can access the FollowMyHealth Patient Portal offered by [facility] by registering at the following website: [email or web address]. By joining [facility]’s FollowMyHealth portal, you will also be able to view your health information using other applications (apps) compatible with our system.

## 2023-03-17 NOTE — ED STATDOCS - PROGRESS NOTE DETAILS
Pt. is an 82 year old female Hx DM2, HTN, OA, hypothyroidism, obesity, panic disorder. presenting with head injury s/p flip and fall in her bathroom.  Pt. hit head on floor.  Pt. denies CP or dizziness prior to or after fall.  Neg. LOC.  Tdap UTD.  Denies N/V.  Charlotte Mcmanus PA-C Abrasion to occiput cleaned and Bacitracin applied.  CT demonstrating mediastinal lymph nodes.  Pt. to follow with PMD.  Return precautions discussed.   Charlotte Mcmanus PA-C

## 2023-03-17 NOTE — ED STATDOCS - ATTENDING APP SHARED VISIT CONTRIBUTION OF CARE
I, Yany Rogers DO,  performed the initial face to face bedside interview with this patient regarding history of present illness, review of symptoms and relevant past medical, social and family history.  I completed an independent physical examination.  I was the initial provider who evaluated this patient.   I personally saw the patient and performed a substantive portion of the visit including all aspects of the medical decision making.  I have signed out the follow up of any pending tests (i.e. labs, radiological studies) to the ACP.  I have communicated the patient’s plan of care and disposition with the ACP.  The history, relevant review of systems, past medical and surgical history, medical decision making, and physical examination was documented by the scribe in my presence and I attest to the accuracy of the documentation.

## 2023-03-17 NOTE — ED STATDOCS - EYES, MLM
From: Annette Mejia  To: Jonnathan Childers  Sent: 1/7/2021 2:57 PM CST  Subject: Non-Urgent Medical Question    Hi Dr. Childers,    I was hoping you could help me to understand the Covid vaccine process for non-pregnant women attempting to get pregnant soon. Would your recommendation be to get the vaccine if I am not pregnant yet but trying to conceive? Would there be a time recommendation between getting the vaccine and becoming pregnant? I heard a 28-day window is recommended before becoming pregnant, after receiving the second dose of vaccine. Thank you so much for your advice. Annette Mejia   clear bilaterally.  Pupils equal, round, and reactive to light.

## 2023-03-17 NOTE — ED STATDOCS - OBJECTIVE STATEMENT
81 y/o F with a PMhx of anxiety, DM type 2, hypothyroid, HTN, OA, morbid obesity, panic attacks, ankle swelling, and urine incontinence presents to the ED s/p slip and fall in bathroom x 1.5 hrs PTA with head strike. pt states she was in the bathroom when she slipped and fell backwards, striking the back of her head. pt states she was able to get up by herself. No LOC, nausea, or vomiting. Does not recall last TDAP. 83 y/o F with a PMhx of anxiety, DM type 2, hypothyroid, HTN, OA, morbid obesity, panic attacks, ankle swelling, and urine incontinence presents to the ED s/p slip and fall in bathroom x 1.5 hrs PTA with head strike. pt states she was in the bathroom when she slipped and fell backwards, striking the back of her head. pt states she was able to get up by herself. No LOC, nausea, or vomiting. adacel uptodate

## 2023-03-17 NOTE — ED ADULT TRIAGE NOTE - CHIEF COMPLAINT QUOTE
referred to ED from  for head CT. pt sustained slip and fall in bathroom at home ~ 45 minutes ago. + head strike, hematoma noted to posterior occiput, no active bleeding. pt is not on anticoagulants or antiplatelet therapy. denies LOC. d/w PA, pt does not meet neuro alert criteria.

## 2023-03-17 NOTE — ED ADULT NURSE NOTE - OBJECTIVE STATEMENT
Pt came in to ED today s/p fall at home. Pt stated she was in the bathroom when she slipped and fell backwards and hit her head. Pt stated she was able to get up by herself. Pt ambulatory s/p fall. Pt denies n/v/d, LOC.

## 2023-03-17 NOTE — ED STATDOCS - CLINICAL SUMMARY MEDICAL DECISION MAKING FREE TEXT BOX
83 y/o F s/p fall with head injury pt to be taken immediately to CT. 81 y/o F s/p fall with head injury pt to be taken immediately to CT.            Abrasion to occiput cleaned and Bacitracin applied.  CT demonstrating mediastinal lymph nodes.  Pt. to follow with PMD.  Return precautions discussed.   Charlotte Mcmanus PA-C

## 2023-03-17 NOTE — ED STATDOCS - ENMT, MLM
Nasal mucosa clear.  hematoma with abrasion on the posterior scalp. Mouth with normal mucosa  Throat has no vesicles, no oropharyngeal exudates and uvula is midline.

## 2023-03-17 NOTE — ED ADULT NURSE NOTE - NSIMPLEMENTINTERV_GEN_ALL_ED
Implemented All Fall Risk Interventions:  [place] to call system. Call bell, personal items and telephone within reach. Instruct patient to call for assistance. Room bathroom lighting operational. Non-slip footwear when patient is off stretcher. Physically safe environment: no spills, clutter or unnecessary equipment. Stretcher in lowest position, wheels locked, appropriate side rails in place. Provide visual cue, wrist band, yellow gown, etc. Monitor gait and stability. Monitor for mental status changes and reorient to person, place, and time. Review medications for side effects contributing to fall risk. Reinforce activity limits and safety measures with patient and family.

## 2023-03-21 ENCOUNTER — APPOINTMENT (OUTPATIENT)
Dept: INTERNAL MEDICINE | Facility: CLINIC | Age: 83
End: 2023-03-21

## 2023-03-23 ENCOUNTER — NON-APPOINTMENT (OUTPATIENT)
Age: 83
End: 2023-03-23

## 2023-03-27 ENCOUNTER — INPATIENT (INPATIENT)
Facility: HOSPITAL | Age: 83
LOS: 5 days | Discharge: HOME HEALTH | DRG: 291 | End: 2023-04-02
Attending: HOSPITALIST | Admitting: INTERNAL MEDICINE
Payer: MEDICARE

## 2023-03-27 ENCOUNTER — RX RENEWAL (OUTPATIENT)
Age: 83
End: 2023-03-27

## 2023-03-27 ENCOUNTER — APPOINTMENT (OUTPATIENT)
Dept: INTERNAL MEDICINE | Facility: CLINIC | Age: 83
End: 2023-03-27
Payer: MEDICARE

## 2023-03-27 VITALS — OXYGEN SATURATION: 96 %

## 2023-03-27 VITALS
TEMPERATURE: 98.1 F | SYSTOLIC BLOOD PRESSURE: 160 MMHG | HEIGHT: 62 IN | OXYGEN SATURATION: 76 % | BODY MASS INDEX: 48.58 KG/M2 | DIASTOLIC BLOOD PRESSURE: 70 MMHG | WEIGHT: 264 LBS | HEART RATE: 69 BPM

## 2023-03-27 VITALS — HEIGHT: 65 IN

## 2023-03-27 VITALS — OXYGEN SATURATION: 88 %

## 2023-03-27 DIAGNOSIS — Z98.890 OTHER SPECIFIED POSTPROCEDURAL STATES: Chronic | ICD-10-CM

## 2023-03-27 DIAGNOSIS — Z98.49 CATARACT EXTRACTION STATUS, UNSPECIFIED EYE: Chronic | ICD-10-CM

## 2023-03-27 DIAGNOSIS — Z90.89 ACQUIRED ABSENCE OF OTHER ORGANS: Chronic | ICD-10-CM

## 2023-03-27 DIAGNOSIS — Z41.9 ENCOUNTER FOR PROCEDURE FOR PURPOSES OTHER THAN REMEDYING HEALTH STATE, UNSPECIFIED: Chronic | ICD-10-CM

## 2023-03-27 LAB — NT-PROBNP SERPL-SCNC: 185 PG/ML — SIGNIFICANT CHANGE UP (ref 0–450)

## 2023-03-27 PROCEDURE — 99213 OFFICE O/P EST LOW 20 MIN: CPT

## 2023-03-27 PROCEDURE — 93010 ELECTROCARDIOGRAM REPORT: CPT

## 2023-03-27 PROCEDURE — 71045 X-RAY EXAM CHEST 1 VIEW: CPT | Mod: 26

## 2023-03-27 PROCEDURE — 99285 EMERGENCY DEPT VISIT HI MDM: CPT | Mod: FS

## 2023-03-27 NOTE — PLAN
[FreeTextEntry1] : 1. EMS to transport pt to  ED for evaluation  of hypoxemia and lower extremity cellulitis. \par \par

## 2023-03-27 NOTE — PHYSICAL EXAM
[No Acute Distress] : no acute distress [No JVD] : no jugular venous distention [No Lymphadenopathy] : no lymphadenopathy [Supple] : supple [No Accessory Muscle Use] : no accessory muscle use [Normal Rate] : normal rate  [Regular Rhythm] : with a regular rhythm [No Extremity Clubbing/Cyanosis] : no extremity clubbing/cyanosis [Soft] : abdomen soft [Normal Anterior Cervical Nodes] : no anterior cervical lymphadenopathy [No Focal Deficits] : no focal deficits [Normal Gait] : normal gait [Normal Affect] : the affect was normal [Normal Insight/Judgement] : insight and judgment were intact [de-identified] : bilateral conjunctivitis  [de-identified] : tachypneic, diminished bilaterally  [de-identified] : bilateral le cellulitis and +2 edema  [de-identified] : obese [de-identified] : cellulitis le

## 2023-03-27 NOTE — HISTORY OF PRESENT ILLNESS
[FreeTextEntry8] : 82F with PMHx of chronic hypoxemia not on supplemental oxygen, HF, obesity, urinary incontinence presents to office c/o conjunctivitis. She was seen at  on 3/24/23 and prescribed polymyxin B but Rx was never sent to pharmacy. Upon arrival pt hypoxemic to 76% on RA, owens/sob and with bilateral lower extremity edema and cellulitis. \par \par Patient is being followed by Dr. Peñaloza for fluid overload, she was recently started on Furosemide 20mg PO daily, her HCTZ was stopped. It was felt dyspnea and hypoxemia were not cardiac related, pt is waiting to have a 6 minute walk test, but fell injuring her leg and is unable to tolerate. Pt placed on 3L supplemental oxygen.

## 2023-03-27 NOTE — REVIEW OF SYSTEMS
[Fever] : no fever [Chills] : no chills [Fatigue] : no fatigue [Discharge] : discharge [Pain] : pain [Redness] : redness [Vision Problems] : no vision problems [Itching] : itching [Earache] : no earache [Nasal Discharge] : no nasal discharge [Chest Pain] : no chest pain [Palpitations] : no palpitations [Leg Claudication] : no leg claudication [Lower Ext Edema] : lower extremity edema [Orthopnea] : orthopnea [Shortness Of Breath] : shortness of breath [Wheezing] : no wheezing [Cough] : cough [Dyspnea on Exertion] : dyspnea on exertion [Abdominal Pain] : no abdominal pain [Diarrhea] : diarrhea [Constipation] : no constipation [Vomiting] : no vomiting [Dysuria] : no dysuria [Hematuria] : no hematuria [Joint Pain] : no joint pain [Muscle Pain] : no muscle pain [Itching] : Itching [Skin Rash] : skin rash [Headache] : no headache [Dizziness] : no dizziness [Anxiety] : no anxiety [Easy Bruising] : no easy bruising [Swollen Glands] : no swollen glands

## 2023-03-28 DIAGNOSIS — I50.31 ACUTE DIASTOLIC (CONGESTIVE) HEART FAILURE: ICD-10-CM

## 2023-03-28 DIAGNOSIS — I27.20 PULMONARY HYPERTENSION, UNSPECIFIED: ICD-10-CM

## 2023-03-28 DIAGNOSIS — R60.0 LOCALIZED EDEMA: ICD-10-CM

## 2023-03-28 DIAGNOSIS — I50.9 HEART FAILURE, UNSPECIFIED: ICD-10-CM

## 2023-03-28 DIAGNOSIS — R06.02 SHORTNESS OF BREATH: ICD-10-CM

## 2023-03-28 LAB
GLUCOSE BLDC GLUCOMTR-MCNC: 110 MG/DL — HIGH (ref 70–99)
GLUCOSE BLDC GLUCOMTR-MCNC: 119 MG/DL — HIGH (ref 70–99)
GLUCOSE BLDC GLUCOMTR-MCNC: 136 MG/DL — HIGH (ref 70–99)
SARS-COV-2 RNA SPEC QL NAA+PROBE: SIGNIFICANT CHANGE UP

## 2023-03-28 PROCEDURE — 82947 ASSAY GLUCOSE BLOOD QUANT: CPT

## 2023-03-28 PROCEDURE — 83036 HEMOGLOBIN GLYCOSYLATED A1C: CPT

## 2023-03-28 PROCEDURE — 71275 CT ANGIOGRAPHY CHEST: CPT | Mod: 26,MH

## 2023-03-28 PROCEDURE — 82962 GLUCOSE BLOOD TEST: CPT

## 2023-03-28 PROCEDURE — 36600 WITHDRAWAL OF ARTERIAL BLOOD: CPT

## 2023-03-28 PROCEDURE — 99223 1ST HOSP IP/OBS HIGH 75: CPT

## 2023-03-28 PROCEDURE — 85025 COMPLETE CBC W/AUTO DIFF WBC: CPT

## 2023-03-28 PROCEDURE — 84100 ASSAY OF PHOSPHORUS: CPT

## 2023-03-28 PROCEDURE — 83735 ASSAY OF MAGNESIUM: CPT

## 2023-03-28 PROCEDURE — 94760 N-INVAS EAR/PLS OXIMETRY 1: CPT

## 2023-03-28 PROCEDURE — 80048 BASIC METABOLIC PNL TOTAL CA: CPT

## 2023-03-28 PROCEDURE — 87635 SARS-COV-2 COVID-19 AMP PRB: CPT

## 2023-03-28 PROCEDURE — 93306 TTE W/DOPPLER COMPLETE: CPT

## 2023-03-28 PROCEDURE — 36415 COLL VENOUS BLD VENIPUNCTURE: CPT

## 2023-03-28 PROCEDURE — 82803 BLOOD GASES ANY COMBINATION: CPT

## 2023-03-28 RX ORDER — GLUCAGON INJECTION, SOLUTION 0.5 MG/.1ML
1 INJECTION, SOLUTION SUBCUTANEOUS ONCE
Refills: 0 | Status: DISCONTINUED | OUTPATIENT
Start: 2023-03-28 | End: 2023-04-02

## 2023-03-28 RX ORDER — ZOLPIDEM TARTRATE 10 MG/1
5 TABLET ORAL AT BEDTIME
Refills: 0 | Status: DISCONTINUED | OUTPATIENT
Start: 2023-03-28 | End: 2023-04-02

## 2023-03-28 RX ORDER — LANOLIN ALCOHOL/MO/W.PET/CERES
3 CREAM (GRAM) TOPICAL AT BEDTIME
Refills: 0 | Status: DISCONTINUED | OUTPATIENT
Start: 2023-03-28 | End: 2023-04-02

## 2023-03-28 RX ORDER — DEXTROSE 50 % IN WATER 50 %
15 SYRINGE (ML) INTRAVENOUS ONCE
Refills: 0 | Status: DISCONTINUED | OUTPATIENT
Start: 2023-03-28 | End: 2023-04-02

## 2023-03-28 RX ORDER — DEXTROSE 50 % IN WATER 50 %
25 SYRINGE (ML) INTRAVENOUS ONCE
Refills: 0 | Status: DISCONTINUED | OUTPATIENT
Start: 2023-03-28 | End: 2023-04-02

## 2023-03-28 RX ORDER — FUROSEMIDE 40 MG
40 TABLET ORAL DAILY
Refills: 0 | Status: DISCONTINUED | OUTPATIENT
Start: 2023-03-28 | End: 2023-03-28

## 2023-03-28 RX ORDER — ACETAMINOPHEN 500 MG
650 TABLET ORAL EVERY 6 HOURS
Refills: 0 | Status: DISCONTINUED | OUTPATIENT
Start: 2023-03-28 | End: 2023-04-02

## 2023-03-28 RX ORDER — NEBIVOLOL HYDROCHLORIDE 5 MG/1
5 TABLET ORAL DAILY
Refills: 0 | Status: DISCONTINUED | OUTPATIENT
Start: 2023-03-28 | End: 2023-03-30

## 2023-03-28 RX ORDER — FUROSEMIDE 40 MG
40 TABLET ORAL EVERY 12 HOURS
Refills: 0 | Status: DISCONTINUED | OUTPATIENT
Start: 2023-03-28 | End: 2023-04-02

## 2023-03-28 RX ORDER — LEVOTHYROXINE SODIUM 125 MCG
137 TABLET ORAL DAILY
Refills: 0 | Status: DISCONTINUED | OUTPATIENT
Start: 2023-03-28 | End: 2023-04-02

## 2023-03-28 RX ORDER — SODIUM CHLORIDE 9 MG/ML
1000 INJECTION, SOLUTION INTRAVENOUS
Refills: 0 | Status: DISCONTINUED | OUTPATIENT
Start: 2023-03-28 | End: 2023-04-02

## 2023-03-28 RX ORDER — LISINOPRIL 2.5 MG/1
20 TABLET ORAL DAILY
Refills: 0 | Status: DISCONTINUED | OUTPATIENT
Start: 2023-03-28 | End: 2023-03-29

## 2023-03-28 RX ORDER — DEXTROSE 50 % IN WATER 50 %
12.5 SYRINGE (ML) INTRAVENOUS ONCE
Refills: 0 | Status: DISCONTINUED | OUTPATIENT
Start: 2023-03-28 | End: 2023-04-02

## 2023-03-28 RX ORDER — ENOXAPARIN SODIUM 100 MG/ML
40 INJECTION SUBCUTANEOUS EVERY 12 HOURS
Refills: 0 | Status: DISCONTINUED | OUTPATIENT
Start: 2023-03-28 | End: 2023-04-02

## 2023-03-28 RX ORDER — INSULIN LISPRO 100/ML
VIAL (ML) SUBCUTANEOUS
Refills: 0 | Status: DISCONTINUED | OUTPATIENT
Start: 2023-03-28 | End: 2023-04-02

## 2023-03-28 RX ORDER — OXYBUTYNIN CHLORIDE 5 MG
5 TABLET ORAL DAILY
Refills: 0 | Status: DISCONTINUED | OUTPATIENT
Start: 2023-03-28 | End: 2023-04-02

## 2023-03-28 RX ORDER — ATORVASTATIN CALCIUM 80 MG/1
80 TABLET, FILM COATED ORAL AT BEDTIME
Refills: 0 | Status: DISCONTINUED | OUTPATIENT
Start: 2023-03-28 | End: 2023-04-02

## 2023-03-28 RX ORDER — CIPROFLOXACIN HCL 0.3 %
2 DROPS OPHTHALMIC (EYE) EVERY 12 HOURS
Refills: 0 | Status: DISCONTINUED | OUTPATIENT
Start: 2023-03-28 | End: 2023-04-02

## 2023-03-28 RX ORDER — ENOXAPARIN SODIUM 100 MG/ML
40 INJECTION SUBCUTANEOUS EVERY 12 HOURS
Refills: 0 | Status: DISCONTINUED | OUTPATIENT
Start: 2023-03-28 | End: 2023-03-28

## 2023-03-28 RX ORDER — CITALOPRAM 10 MG/1
20 TABLET, FILM COATED ORAL DAILY
Refills: 0 | Status: DISCONTINUED | OUTPATIENT
Start: 2023-03-28 | End: 2023-04-02

## 2023-03-28 RX ADMIN — ATORVASTATIN CALCIUM 80 MILLIGRAM(S): 80 TABLET, FILM COATED ORAL at 22:17

## 2023-03-28 RX ADMIN — LISINOPRIL 20 MILLIGRAM(S): 2.5 TABLET ORAL at 17:27

## 2023-03-28 RX ADMIN — Medication 2 DROP(S): at 22:17

## 2023-03-28 RX ADMIN — ENOXAPARIN SODIUM 40 MILLIGRAM(S): 100 INJECTION SUBCUTANEOUS at 22:17

## 2023-03-28 RX ADMIN — Medication 5 MILLIGRAM(S): at 17:26

## 2023-03-28 RX ADMIN — Medication 3 MILLIGRAM(S): at 22:19

## 2023-03-28 NOTE — H&P ADULT - HISTORY OF PRESENT ILLNESS
Pt is a pleasant 81 y/o female w/ PMHx of Anxiety, knee osteoarthritis, Hypertension, Hypothyroidism, Morbid obesity, Panic attacks, Diabetes mellitus II, Urine incontinence recently started on  lasix  ( Cardio) presents to [place]  ED with increasing shortness of breath. She is been evaluated fo SOB and poss ( undocumented ) ambulation hypoxia.   Pt has signs of generalized fluid overload and admits trying to keep well  hydrated at home. She has +3 pitting edema, loud murmur, poss early pulm edema, and despite a low BNP I will consider this CHF related/ Mitral odette most likely.

## 2023-03-28 NOTE — H&P ADULT - ASSESSMENT
* SOB SHANKS chf related to mitral valve reg vs undiagnosed lung condition  was seen at [facility] now  switched to [facility]; was not given a dx of chronic lung ds  r/o worsening Mitral valve pathology  start Lasix IV  TTE  cardio consult  Pox eval ambulating  ? stress test    * T2DM  resume home meds    * LLE mild erythema is not cellulitis, monitor for now

## 2023-03-28 NOTE — H&P ADULT - NSHPLABSRESULTS_GEN_ALL_CORE
13.8   8.49  )-----------( 215      ( 27 Mar 2023 19:18 )             45.1   03-27    142  |  107  |  15  ----------------------------<  134<H>  3.7   |  32<H>  |  0.77    Ca    9.6      27 Mar 2023 19:18  CT Angio Chest PE Protocol w/ IV Cont (03.28.23 @ 01:40) >    IMPRESSION:  No evidence of pulmonary embolism.    Patchy mosaic attenuation of the lung fields, likely related to sequela   of pulmonary edema or underlying small airway disease. Underlying early   infectious process not excluded.      EKG sinus bernardino

## 2023-03-28 NOTE — PATIENT PROFILE ADULT - FALL HARM RISK - HARM RISK INTERVENTIONS

## 2023-03-28 NOTE — CONSULT NOTE ADULT - ASSESSMENT
- cont O2; was hypoxic in office  - CT scan shows pulmonary congestion even though low BNP  - has bilat leg edema  - agree w lasix iv  - echo results pending.  - cardio eval

## 2023-03-28 NOTE — CONSULT NOTE ADULT - SUBJECTIVE AND OBJECTIVE BOX
HPI:  Pt is a pleasant 79 y/o female w/ PMHx of Anxiety, knee osteoarthritis,   Hypertension, Hypothyroidism, Morbid obesity, Panic attacks, Diabetes mellitus II,   Urine incontinence recently started on  lasix  ( Cardio)   presents to [place]  [facility] with increasing shortness of breath.   She is been evaluated fo SOB and poss ( undocumented ) ambulation hypoxia.   Pt has signs of generalized fluid overload and admits trying to keep well    hydrated at home. She has +3 pitting edema, loud murmur,   poss early pulm edema, and despite a low BNP   I will consider this CHF related/ Mitral odette most likely.   (28 Mar 2023 09:41)    Consulted for CHF.  IV lasix started & echo ordered.  Reviewed symptoms w/ pt: dyspnea gradually worsening over the past 2-3 weeks.  Pt reports worsening LE edema, no weight gain, possible orthopnea.  No chest pain, palpitations, lightheadness or other cardiac symptoms.  Dyspnea has improved substantially w/ diuretic therapy.    PAST MEDICAL AND SURGICAL HISTORY:  Anxiety  Type 2 diabetes mellitus  Hypothyroid  Hypertension  History of osteoarthritis  knees  Morbid obesity  Panic attacks  Ankle swelling  b/l  Urine incontinence  Status post dilation and curettage  Elective surgery  insertion of pessary later removed  S/P T&A (status post tonsillectomy and adenoidectomy)  S/P cataract surgery  b/l  S/P ORIF (open reduction internal fixation) fracture  left ankle  H/O colonoscopy  History of esophagogastroduodenoscopy (EGD)      ALLERGIES:  Allergies  Allergy Status Unknown  Intolerances  epinephrine (Other)      FAMILY  HISTORY:  FAMILY HISTORY:  FHx: obesity  Pt is adopted . No other history        MEDICATIONS:  OUTPATIENT:  Home Medications:  aspirin 325 mg oral tablet: 1 tab(s) orally once a day (20 Sep 2021 20:11)  Bystolic 5 mg oral tablet: 1 tab(s) orally once a day (20 Sep 2021 20:11)  Crestor 40 mg oral tablet: 1 tab(s) orally once a day (20 Sep 2021 20:11)  escitalopram 20 mg oral tablet: 1 tab(s) orally once a day (20 Sep 2021 20:11)  furosemide 40 mg oral tablet: 1 tab(s) orally once a day (20 Sep 2021 20:11)  Januvia 100 mg oral tablet: 1 tab(s) orally once a day (20 Sep 2021 20:11)  levothyroxine 150 mcg (0.15 mg) oral capsule: every other day (3 times a week) (20 Sep 2021 20:11)  levothyroxine 175 mcg (0.175 mg) oral tablet: every other day (4 times a week) (20 Sep 2021 20:11)  nateglinide 120 mg oral tablet: 1 tab(s) orally 3 times a day (before meals) (20 Sep 2021 20:11)  Toviaz 4 mg oral tablet, extended release: 1 tab(s) orally once a day  (20 Sep 2021 20:11)      INPATIENT:  MEDICATIONS  (STANDING):  atorvastatin 80 milliGRAM(s) Oral at bedtime  ciprofloxacin  0.3% Ophthalmic Solution 2 Drop(s) Both EYES every 12 hours  citalopram 20 milliGRAM(s) Oral daily  dextrose 5%. 1000 milliLiter(s) (50 mL/Hr) IV Continuous <Continuous>  dextrose 5%. 1000 milliLiter(s) (100 mL/Hr) IV Continuous <Continuous>  dextrose 50% Injectable 25 Gram(s) IV Push once  dextrose 50% Injectable 12.5 Gram(s) IV Push once  dextrose 50% Injectable 25 Gram(s) IV Push once  enoxaparin Injectable 40 milliGRAM(s) SubCutaneous every 12 hours  furosemide   Injectable 40 milliGRAM(s) IV Push daily  glucagon  Injectable 1 milliGRAM(s) IntraMuscular once  insulin lispro (ADMELOG) corrective regimen sliding scale   SubCutaneous three times a day before meals  levothyroxine 137 MICROGram(s) Oral daily  lisinopril 20 milliGRAM(s) Oral daily  nebivolol 5 milliGRAM(s) Oral daily  oxybutynin 5 milliGRAM(s) Oral daily    MEDICATIONS  (PRN):  acetaminophen     Tablet .. 650 milliGRAM(s) Oral every 6 hours PRN Temp greater or equal to 38.5C (101.3F)  aluminum hydroxide/magnesium hydroxide/simethicone Suspension 30 milliLiter(s) Oral every 6 hours PRN Dyspepsia  dextrose Oral Gel 15 Gram(s) Oral once PRN Blood Glucose LESS THAN 70 milliGRAM(s)/deciliter  zolpidem 5 milliGRAM(s) Oral at bedtime PRN Insomnia    MEDICATIONS  (PRN):  acetaminophen     Tablet .. 650 milliGRAM(s) Oral every 6 hours PRN Temp greater or equal to 38.5C (101.3F)  aluminum hydroxide/magnesium hydroxide/simethicone Suspension 30 milliLiter(s) Oral every 6 hours PRN Dyspepsia  dextrose Oral Gel 15 Gram(s) Oral once PRN Blood Glucose LESS THAN 70 milliGRAM(s)/deciliter  zolpidem 5 milliGRAM(s) Oral at bedtime PRN Insomnia      REVIEW OF SYSTEMS:  ===============================  ===============================    Vital Signs Last 24 Hrs  T(C): 36.8 (28 Mar 2023 15:41), Max: 36.8 (28 Mar 2023 15:41)  T(F): 98.2 (28 Mar 2023 15:41), Max: 98.2 (28 Mar 2023 15:41)  HR: 57 (28 Mar 2023 15:41) (53 - 57)  BP: 144/58 (28 Mar 2023 15:41) (144/46 - 144/58)  BP(mean): --  RR: 18 (28 Mar 2023 15:41) (18 - 18)  SpO2: 95% (28 Mar 2023 15:41) (95% - 97%)      PHYSICAL EXAM:    Constitutional: NAD, awake   HEENT: PERR, EOMI,  No oral cyananosis.  Neck:  supple,  No JVD  Respiratory: Breath sounds are clear bilaterally, No wheezing, rales or rhonchi  Cardiovascular: S1 and S2, regular rate and rhythm, no Murmurs, gallops or rubs  Gastrointestinal: Bowel Sounds present, soft, nontender.   Extremities: +2 peripheral edema. No clubbing or cyanosis.  Vascular: 2+ peripheral pulses  Neurological: A/O x 3, no focal deficits      ===============================  ===============================  LABS:                         13.8   8.49  )-----------( 215      ( 27 Mar 2023 19:18 )             45.1     28 Mar 2023 15:45    x      |  x      |  x      ----------------------------<  119    x       |  x      |  x      27 Mar 2023 19:18    142    |  107    |  15     ----------------------------<  134    3.7     |  32     |  0.77     Ca    9.6        27 Mar 2023 19:18      ===============================  ===============================  CARDIAC BIOMARKERS:  -------  -BNP VALUES:  09-22 @ 07:44  Pro Bnp 103  09-21 @ 07:18  Pro Bnp 165  09-20 @ 12:04  Pro Bnp 241    -------  -TROPONIN VALUES:   Troponin I, High Sensitivity Result: 13.68 ng/L (03-27-23 @ 19:18)  Troponin I, Serum: <0.015 ng/mL [0.015 - 0.045] (09-20-21 @ 12:04)    ===============================  ===============================  EKG: NSR no ischemic changes.  ===============================  RADIOLOGY:  ACC: [id number] EXAM:  CT ANGIO CHEST [facility]   ORDERED BY:   SUNITA VICTOR     PROCEDURE DATE:  03/28/2023          INTERPRETATION:  CLINICAL INFORMATION: Rule out PE    COMPARISON: CT chest 9/21/2021.    CONTRAST/COMPLICATIONS:  IV Contrast: Omnipaque 350  80 cc administered   20 cc discarded  Oral Contrast: NONE  Complications: None reported at time of study completion    PROCEDURE:  CT Angiography of the Chest.  Sagittal and coronal reformats were performed as well as 3D (MIP)   reconstructions.    FINDINGS:    LUNGS AND AIRWAYS: Patent central airways.  Lungs demonstrate patchy   mosaic attenuation. Bibasilar atelectasis.  PLEURA: No pleural effusion.  MEDIASTINUM AND ARMOND: Mediastinal and axillary lymphadenopathy noted.  VESSELS: Within normal limits. Mild reflux of contrast into the   intrahepatic IVC and hepatic veins.  HEART: Heart size is is enlarged. No pericardial effusion.  CHEST WALL AND LOWER NECK: 1.7 cm calcified thyroid nodule.  VISUALIZED UPPER ABDOMEN: Cholelithiasis.  BONES: Benign vertebral body osseous hemangioma at T12.    IMPRESSION:  No evidence of pulmonary embolism.    Patchy mosaic attenuation of the lung fields, likely related to sequela   of pulmonary edema or underlying small airway disease.Underlying early   infectious process not excluded.    --- End of Report ---  NKECHI CALLE MD; Attending Radiologist  This document has been electronically signed. Mar 28 2023  2:37AM  ===============================  < from: Xray Chest 1 View AP/PA. (03.27.23 @ 20:26) >    ACC: [id number] EXAM:  XR CHEST 1 VIEW   ORDERED BY: SUNITA VICTOR     PROCEDURE DATE:  03/27/2023          INTERPRETATION:  INDICATION: Short of breath    Portable chest 8:21 PM    COMPARISON: 9/24/2021    Overlying EKG leads and wires.    FINDINGS:  Heart/Vascular: The mediastinum, hilum and aorta are within normal limits   for projection. Heart size borderline enlarged  Pulmonary: Midline trachea. There is mild pulmonary venous congestion.   There is no focal consolidation, pneumothorax or gross pleural effusion.  Bones: There is no fracture.  Lines and catheter: None    Impression:    Mild pulmonary venous congestion.    --- End of Report ---            KJ PALOMO DO; Attending Radiologist  This document has been electronically signed. Mar 28 2023  9:31AM    < end of copied text >        ===============================  ECHO:    < from: TTE Echo Complete w/o Contrast w/ Doppler (09.21.21 @ 08:09) >   Impression     Summary     The mitral valve leaflets appear thickened.   Moderate mitral annular calcification is present.   Moderate (2+) mitral regurgitation is present.   Fibrocalcific changes noted to the Aortic valve leaflets with preserved   leaflet excursion.   Mild to Moderate Tricuspid regurgitation is present.   Normal appearing pulmonic valve structure and function.   The left atriumis mildly dilated.   Mild concentric left ventricular hypertrophy is present.   Estimated left ventricular ejection fraction is 60 %.   Normal appearing right atrium.   Normal appearing right ventricle structure and function.     Signature     ----------------------------------------------------------------   Electronically signed by Gladis Willard MD(Interpreting   physician) on 09/21/2021 11:52 AM   ----------------------------------------------------------------    < end of copied text >  ===============================  OUTPATIENT CARDIAC TESTING PER DR. KELLER:    Cardiology Summary    ECG: 3/1/2023. Sinus Rhythm. Left axis -anterior fascicular block. Nonspecific IVCD. Old septal infarct.     Cardiac Cath/PCI: 5/31/18. Normal coronary arteries. Moderate pulmonary hypertension.   Elevated pulmonary capillary wedge pressure.     ===============================  Gwyn Montana M.D.  Cardiology, [facility]  Cell: [phone]  Office:    ([facility] Office)  [phone] ([facility] Office)    ===============================
  HPI:  Pt is a pleasant 81 y/o female w/ PMHx of Anxiety, knee osteoarthritis, Hypertension, Hypothyroidism, Morbid obesity, Panic attacks, Diabetes mellitus II, Urine incontinence recently started on  lasix  ( Cardio) presents to [place]  ED with increasing shortness of breath. She is been evaluated fo SOB and poss ( undocumented ) ambulation hypoxia.   Pt has signs of generalized fluid overload and admits trying to keep well  hydrated at home. She has +3 pitting edema, loud murmur, poss early pulm edema, and despite a low BNP I will consider this CHF related/ Mitral odette most likely.   (28 Mar 2023 09:41)    3/28: History as above. Seen in office w NP yesterday. Examined in emergency room.      PAST MEDICAL & SURGICAL HISTORY:  Anxiety      Type 2 diabetes mellitus      Hypothyroid      Hypertension      History of osteoarthritis  knees      Morbid obesity      Panic attacks      Ankle swelling  b/l      Urine incontinence      Status post dilation and curettage      Elective surgery  insertion of pessary later removed      S/P T&A (status post tonsillectomy and adenoidectomy)      S/P cataract surgery  b/l      S/P ORIF (open reduction internal fixation) fracture  left ankle      H/O colonoscopy      History of esophagogastroduodenoscopy (EGD)          MEDICATIONS  (STANDING):  atorvastatin 80 milliGRAM(s) Oral at bedtime  ciprofloxacin  0.3% Ophthalmic Solution 2 Drop(s) Both EYES every 12 hours  citalopram 20 milliGRAM(s) Oral daily  dextrose 5%. 1000 milliLiter(s) (50 mL/Hr) IV Continuous <Continuous>  dextrose 5%. 1000 milliLiter(s) (100 mL/Hr) IV Continuous <Continuous>  dextrose 50% Injectable 25 Gram(s) IV Push once  dextrose 50% Injectable 12.5 Gram(s) IV Push once  dextrose 50% Injectable 25 Gram(s) IV Push once  enoxaparin Injectable 40 milliGRAM(s) SubCutaneous every 12 hours  furosemide   Injectable 40 milliGRAM(s) IV Push daily  glucagon  Injectable 1 milliGRAM(s) IntraMuscular once  insulin lispro (ADMELOG) corrective regimen sliding scale   SubCutaneous three times a day before meals  levothyroxine 137 MICROGram(s) Oral daily  lisinopril 20 milliGRAM(s) Oral daily  nebivolol 5 milliGRAM(s) Oral daily  oxybutynin 5 milliGRAM(s) Oral daily    MEDICATIONS  (PRN):  acetaminophen     Tablet .. 650 milliGRAM(s) Oral every 6 hours PRN Temp greater or equal to 38.5C (101.3F)  aluminum hydroxide/magnesium hydroxide/simethicone Suspension 30 milliLiter(s) Oral every 6 hours PRN Dyspepsia  dextrose Oral Gel 15 Gram(s) Oral once PRN Blood Glucose LESS THAN 70 milliGRAM(s)/deciliter  zolpidem 5 milliGRAM(s) Oral at bedtime PRN Insomnia      Allergies    Allergy Status Unknown    Intolerances    epinephrine (Other)      SOCIAL HISTORY: Denies tobacco, etoh abuse or illicit drug use    FAMILY HISTORY:  FHx: obesity  Pt is adopted . No other history        Vital Signs Last 24 Hrs  T(C): 36.8 (28 Mar 2023 15:41), Max: 36.8 (28 Mar 2023 15:41)  T(F): 98.2 (28 Mar 2023 15:41), Max: 98.2 (28 Mar 2023 15:41)  HR: 57 (28 Mar 2023 15:41) (53 - 57)  BP: 144/58 (28 Mar 2023 15:41) (144/46 - 144/58)  BP(mean): --  RR: 18 (28 Mar 2023 15:41) (18 - 18)  SpO2: 95% (28 Mar 2023 15:41) (95% - 97%)    Parameters below as of 28 Mar 2023 15:41  Patient On (Oxygen Delivery Method): nasal cannula  O2 Flow (L/min): 2      REVIEW OF SYSTEMS:    CONSTITUTIONAL:  As per HPI.  SKIN: no rashes  HEENT:  Eyes:  No diplopia or blurred vision. ENT:  No earache, sore throat or runny nose.  CARDIOVASCULAR:  No pressure, squeezing, tightness, heaviness or aching about the chest, neck, axilla or epigastrium.  RESPIRATORY:  sob  GASTROINTESTINAL:  No nausea, vomiting or diarrhea.  GENITOURINARY:  No dysuria, frequency or urgency.  MUSCULOSKELETAL:  As per HPI.  SKIN:  No change in skin, hair or nails.  NEUROLOGIC:  No paresthesias, fasciculations, seizures or weakness.  PSYCHIATRIC:  No disorder of thought or mood.  ENDOCRINE:  No heat or cold intolerance, polyuria or polydipsia.  HEMATOLOGICAL:  No easy bruising or bleedings:  .     PHYSICAL EXAMINATION:    GENERAL APPEARANCE:  Pt. is not currently dyspneic, in no distress. Pt. is alert, oriented, and pleasant.  HEENT:  Pupils are normal and react normally. No icterus. Mucous membranes well colored.  NECK:  Supple. No lymphadenopathy. Jugular venous pressure not elevated. Carotids equal.   HEART:   S1S2 reg  CHEST:  scattered ronchi w bibasilar cracles  ABDOMEN:  Soft and nontender.   EXTREMITIES:  2+ edema  SKIN:  No rash or significant lesions are noted.    LABS:                        13.8   8.49  )-----------( 215      ( 27 Mar 2023 19:18 )             45.1     03-28    x   |  x   |  x   ----------------------------<  119<H>  x    |  x   |  x     Ca    9.6      27 Mar 2023 19:18                    RADIOLOGY & ADDITIONAL STUDIES:

## 2023-03-28 NOTE — H&P ADULT - NSHPPHYSICALEXAM_GEN_ALL_CORE
PHYSICAL EXAM:      Constitutional: NAD  Eyes: perrl, no conjunctival changes  ENMT: no exudates, moist oral muc, uvula midline  Neck: no JVD, no LAD  Back: no cva tenderness  Respiratory: CTA, no exp wheezes  Cardiovascular: S1S2 reg, no murmur gallop or rub  Gastrointestinal: abd soft, NT/ND + BS  Genitourinary: voiding  Extremities: FROM, no joint effusions, no edema, no clubbing , no cyanosis  Vascular: pedal pulses + bilateral, warm extremities  Neurological: non focal, mot str 5/5/ all extr  Skin: no rashes  Lymph Nodes: no LAD

## 2023-03-28 NOTE — CONSULT NOTE ADULT - PROBLEM SELECTOR RECOMMENDATION 9
-Symptoms have improved w/ IV lasix.  Increase to 40 IV BID.  -fluid restrict 1.5 L/24 hrs, strict Is & Os, daily wts, keep K>4, Mg>2  -Await results of echo.  -If SBPs remain in 140s will consider increasing lisinopril from 20 to 40 daily.

## 2023-03-29 DIAGNOSIS — I10 ESSENTIAL (PRIMARY) HYPERTENSION: ICD-10-CM

## 2023-03-29 LAB
GLUCOSE BLDC GLUCOMTR-MCNC: 131 MG/DL — HIGH (ref 70–99)
GLUCOSE BLDC GLUCOMTR-MCNC: 147 MG/DL — HIGH (ref 70–99)
GLUCOSE BLDC GLUCOMTR-MCNC: 155 MG/DL — HIGH (ref 70–99)

## 2023-03-29 PROCEDURE — 99233 SBSQ HOSP IP/OBS HIGH 50: CPT

## 2023-03-29 PROCEDURE — 93306 TTE W/DOPPLER COMPLETE: CPT | Mod: 26

## 2023-03-29 RX ORDER — LISINOPRIL 2.5 MG/1
40 TABLET ORAL DAILY
Refills: 0 | Status: DISCONTINUED | OUTPATIENT
Start: 2023-03-29 | End: 2023-04-02

## 2023-03-29 RX ORDER — HYDROCORTISONE 1 %
1 OINTMENT (GRAM) TOPICAL
Refills: 0 | Status: DISCONTINUED | OUTPATIENT
Start: 2023-03-29 | End: 2023-04-02

## 2023-03-29 RX ORDER — BENZOCAINE AND MENTHOL 5; 1 G/100ML; G/100ML
1 LIQUID ORAL EVERY 4 HOURS
Refills: 0 | Status: DISCONTINUED | OUTPATIENT
Start: 2023-03-29 | End: 2023-04-02

## 2023-03-29 RX ADMIN — ATORVASTATIN CALCIUM 80 MILLIGRAM(S): 80 TABLET, FILM COATED ORAL at 21:51

## 2023-03-29 RX ADMIN — Medication 5 MILLIGRAM(S): at 09:37

## 2023-03-29 RX ADMIN — CITALOPRAM 20 MILLIGRAM(S): 10 TABLET, FILM COATED ORAL at 09:37

## 2023-03-29 RX ADMIN — LISINOPRIL 20 MILLIGRAM(S): 2.5 TABLET ORAL at 09:37

## 2023-03-29 RX ADMIN — ENOXAPARIN SODIUM 40 MILLIGRAM(S): 100 INJECTION SUBCUTANEOUS at 09:36

## 2023-03-29 RX ADMIN — Medication 3 MILLIGRAM(S): at 21:53

## 2023-03-29 RX ADMIN — ENOXAPARIN SODIUM 40 MILLIGRAM(S): 100 INJECTION SUBCUTANEOUS at 21:51

## 2023-03-29 RX ADMIN — Medication 2 DROP(S): at 09:37

## 2023-03-29 RX ADMIN — NEBIVOLOL HYDROCHLORIDE 5 MILLIGRAM(S): 5 TABLET ORAL at 09:37

## 2023-03-29 RX ADMIN — Medication 40 MILLIGRAM(S): at 06:11

## 2023-03-29 RX ADMIN — Medication 137 MICROGRAM(S): at 06:11

## 2023-03-29 RX ADMIN — Medication 40 MILLIGRAM(S): at 18:00

## 2023-03-29 RX ADMIN — Medication 2 DROP(S): at 21:51

## 2023-03-29 RX ADMIN — Medication 1: at 08:52

## 2023-03-29 RX ADMIN — BENZOCAINE AND MENTHOL 1 LOZENGE: 5; 1 LIQUID ORAL at 21:52

## 2023-03-29 NOTE — PROGRESS NOTE ADULT - SUBJECTIVE AND OBJECTIVE BOX
Patient seen and examined  feels better  still volume overloaded  on IV lasix  cards/pulm eval appreciated  echo report pending    Review of Systems:  General:denies fever chills, headache, weakness  HEENT: denies blurry vision, diffculty swallowing, difficulty hearing, tinnitus  Cardiovascular: denies chest pain  ,palpitations  Pulmonary:denies shortness of breath, cough, wheezing, hemoptysis  Gastrointestinal: denies abdominal pain, constipation, diarrhea,nausea , vomiting, hematochezia  : denies hematuria, dysuria, or incontinence  Neurological: denies weakness, numbness , tingling, dizziness, tremors  MSK: denies muscle pain, difficulty ambulating, swelling, back pain  skin: denies skin rash, itching, burning, or  skin lesions  Psychiatrical: denies mood disturbances, anxierty, feeling depressed, depression , or difficulty sleeping    Objective:  Vitals  T(C): 36.6 (03-29-23 @ 08:38), Max: 36.8 (03-28-23 @ 15:41)  HR: 65 (03-29-23 @ 09:38) (48 - 65)  BP: 152/59 (03-29-23 @ 08:38) (143/51 - 152/59)  RR: 17 (03-29-23 @ 08:38) (17 - 18)  SpO2: 91% (03-29-23 @ 08:38) (91% - 97%)    Physical Exam:  General: comfortable, no acute distress  HEENT: Atraumatic, no LAD, trachea midline, PERRLA  Cardiovascular: normal s1s2, + murmurs, gallops or fricition rubs  Pulmonary: clear to ausculation Bilaterally, no wheezing , rhonchi  Gastrointestinal: soft non tender non distended, no masses felt, no organomegally  Muscloskeletal: ++ lower extremity edema, intact bilateral lower extremity pulses  Neurological: CN II-12 intact. No focal weakness  Psychiatrical: normal mood, cooperative  SKIN: no rash, lesions or ulcers    Labs:                          13.8   8.49  )-----------( 215      ( 27 Mar 2023 19:18 )             45.1     03-29    140  |  103  |  14  ----------------------------<  141<H>  3.6   |  33<H>  |  0.69    Ca    9.1      29 Mar 2023 08:39              Active Medications  MEDICATIONS  (STANDING):  atorvastatin 80 milliGRAM(s) Oral at bedtime  ciprofloxacin  0.3% Ophthalmic Solution 2 Drop(s) Both EYES every 12 hours  citalopram 20 milliGRAM(s) Oral daily  dextrose 5%. 1000 milliLiter(s) (50 mL/Hr) IV Continuous <Continuous>  dextrose 5%. 1000 milliLiter(s) (100 mL/Hr) IV Continuous <Continuous>  dextrose 50% Injectable 25 Gram(s) IV Push once  dextrose 50% Injectable 12.5 Gram(s) IV Push once  dextrose 50% Injectable 25 Gram(s) IV Push once  enoxaparin Injectable 40 milliGRAM(s) SubCutaneous every 12 hours  furosemide   Injectable 40 milliGRAM(s) IV Push every 12 hours  glucagon  Injectable 1 milliGRAM(s) IntraMuscular once  insulin lispro (ADMELOG) corrective regimen sliding scale   SubCutaneous three times a day before meals  levothyroxine 137 MICROGram(s) Oral daily  lisinopril 20 milliGRAM(s) Oral daily  nebivolol 5 milliGRAM(s) Oral daily  oxybutynin 5 milliGRAM(s) Oral daily    MEDICATIONS  (PRN):  acetaminophen     Tablet .. 650 milliGRAM(s) Oral every 6 hours PRN Temp greater or equal to 38.5C (101.3F)  aluminum hydroxide/magnesium hydroxide/simethicone Suspension 30 milliLiter(s) Oral every 6 hours PRN Dyspepsia  dextrose Oral Gel 15 Gram(s) Oral once PRN Blood Glucose LESS THAN 70 milliGRAM(s)/deciliter  melatonin 3 milliGRAM(s) Oral at bedtime PRN Insomnia  zolpidem 5 milliGRAM(s) Oral at bedtime PRN Insomnia

## 2023-03-29 NOTE — PROGRESS NOTE ADULT - SUBJECTIVE AND OBJECTIVE BOX
HPI:  Pt is a pleasant 81 y/o female w/ PMHx of Anxiety, knee osteoarthritis,   Hypertension, Hypothyroidism, Morbid obesity, Panic attacks, Diabetes mellitus II,   Urine incontinence recently started on  lasix  ( Cardio)   presents to [place]  [facility] with increasing shortness of breath.   She is been evaluated fo SOB and poss ( undocumented ) ambulation hypoxia.   Pt has signs of generalized fluid overload and admits trying to keep well    hydrated at home. She has +3 pitting edema, loud murmur,   poss early pulm edema, and despite a low BNP   I will consider this CHF related/ Mitral odette most likely.   (28 Mar 2023 09:41)    Consulted for CHF.  IV lasix started & echo ordered.  Reviewed symptoms w/ pt: dyspnea gradually worsening over the past 2-3 weeks.  Pt reports worsening LE edema, no weight gain, possible orthopnea.  No chest pain, palpitations, lightheadness or other cardiac symptoms.  Dyspnea has improved substantially w/ diuretic therapy.      3/28/23: no chest pain, SOB improved with iv lasix    MEDICATIONS:  OUTPATIENT:  Home Medications:  aspirin 325 mg oral tablet: 1 tab(s) orally once a day (20 Sep 2021 20:11)  Bystolic 5 mg oral tablet: 1 tab(s) orally once a day (20 Sep 2021 20:11)  Crestor 40 mg oral tablet: 1 tab(s) orally once a day (20 Sep 2021 20:11)  escitalopram 20 mg oral tablet: 1 tab(s) orally once a day (20 Sep 2021 20:11)  furosemide 40 mg oral tablet: 1 tab(s) orally once a day (20 Sep 2021 20:11)  Januvia 100 mg oral tablet: 1 tab(s) orally once a day (20 Sep 2021 20:11)  levothyroxine 150 mcg (0.15 mg) oral capsule: every other day (3 times a week) (20 Sep 2021 20:11)  levothyroxine 175 mcg (0.175 mg) oral tablet: every other day (4 times a week) (20 Sep 2021 20:11)  nateglinide 120 mg oral tablet: 1 tab(s) orally 3 times a day (before meals) (20 Sep 2021 20:11)  Toviaz 4 mg oral tablet, extended release: 1 tab(s) orally once a day  (20 Sep 2021 20:11)      MEDICATIONS  (STANDING):  atorvastatin 80 milliGRAM(s) Oral at bedtime  ciprofloxacin  0.3% Ophthalmic Solution 2 Drop(s) Both EYES every 12 hours  citalopram 20 milliGRAM(s) Oral daily  dextrose 5%. 1000 milliLiter(s) (50 mL/Hr) IV Continuous <Continuous>  dextrose 5%. 1000 milliLiter(s) (100 mL/Hr) IV Continuous <Continuous>  dextrose 50% Injectable 25 Gram(s) IV Push once  dextrose 50% Injectable 12.5 Gram(s) IV Push once  dextrose 50% Injectable 25 Gram(s) IV Push once  enoxaparin Injectable 40 milliGRAM(s) SubCutaneous every 12 hours  furosemide   Injectable 40 milliGRAM(s) IV Push every 12 hours  glucagon  Injectable 1 milliGRAM(s) IntraMuscular once  insulin lispro (ADMELOG) corrective regimen sliding scale   SubCutaneous three times a day before meals  levothyroxine 137 MICROGram(s) Oral daily  lisinopril 40 milliGRAM(s) Oral daily  nebivolol 5 milliGRAM(s) Oral daily  oxybutynin 5 milliGRAM(s) Oral daily      MEDICATIONS  (PRN):  acetaminophen     Tablet .. 650 milliGRAM(s) Oral every 6 hours PRN Temp greater or equal to 38.5C (101.3F)  aluminum hydroxide/magnesium hydroxide/simethicone Suspension 30 milliLiter(s) Oral every 6 hours PRN Dyspepsia  dextrose Oral Gel 15 Gram(s) Oral once PRN Blood Glucose LESS THAN 70 milliGRAM(s)/deciliter  zolpidem 5 milliGRAM(s) Oral at bedtime PRN Insomnia    MEDICATIONS  (PRN):  acetaminophen     Tablet .. 650 milliGRAM(s) Oral every 6 hours PRN Temp greater or equal to 38.5C (101.3F)  aluminum hydroxide/magnesium hydroxide/simethicone Suspension 30 milliLiter(s) Oral every 6 hours PRN Dyspepsia  dextrose Oral Gel 15 Gram(s) Oral once PRN Blood Glucose LESS THAN 70 milliGRAM(s)/deciliter  zolpidem 5 milliGRAM(s) Oral at bedtime PRN Insomnia      PHYSICAL EXAM:    Constitutional: NAD, awake   HEENT: PERR, EOMI,  No oral cyananosis.  Neck:  supple,  No JVD  Respiratory: Breath sounds are clear bilaterally, No wheezing, rales or rhonchi  Cardiovascular: S1 and S2, regular rate and rhythm, no Murmurs, gallops or rubs  Gastrointestinal: Bowel Sounds present, soft, nontender.   Extremities: +2 peripheral edema. No clubbing or cyanosis.  Vascular: 2+ peripheral pulses  Neurological: A/O x 3, no focal deficits      ===============================  ===============================  LABS:                         13.8   8.49  )-----------( 215      ( 27 Mar 2023 19:18 )             45.1     28 Mar 2023 15:45    x      |  x      |  x      ----------------------------<  119    x       |  x      |  x      27 Mar 2023 19:18    142    |  107    |  15     ----------------------------<  134    3.7     |  32     |  0.77     Ca    9.6        27 Mar 2023 19:18      ===============================  ===============================  CARDIAC BIOMARKERS:  -------  -BNP VALUES:  09-22 @ 07:44  Pro Bnp 103  09-21 @ 07:18  Pro Bnp 165  09-20 @ 12:04  Pro Bnp 241    -------  -TROPONIN VALUES:   Troponin I, High Sensitivity Result: 13.68 ng/L (03-27-23 @ 19:18)  Troponin I, Serum: <0.015 ng/mL [0.015 - 0.045] (09-20-21 @ 12:04)    ===============================  ===============================  EKG: NSR no ischemic changes.  ===============================  RADIOLOGY:  ACC: [id number] EXAM:  CT ANGIO CHEST PULM ART [facility]   ORDERED BY:   SUNITA VICTOR     PROCEDURE DATE:  03/28/2023          INTERPRETATION:  CLINICAL INFORMATION: Rule out PE    COMPARISON: CT chest 9/21/2021.    CONTRAST/COMPLICATIONS:  IV Contrast: Omnipaque 350  80 cc administered   20 cc discarded  Oral Contrast: NONE  Complications: None reported at time of study completion    PROCEDURE:  CT Angiography of the Chest.  Sagittal and coronal reformats were performed as well as 3D (MIP)   reconstructions.    FINDINGS:    LUNGS AND AIRWAYS: Patent central airways.  Lungs demonstrate patchy   mosaic attenuation. Bibasilar atelectasis.  PLEURA: No pleural effusion.  MEDIASTINUM AND ARMOND: Mediastinal and axillary lymphadenopathy noted.  VESSELS: Within normal limits. Mild reflux of contrast into the   intrahepatic IVC and hepatic veins.  HEART: Heart size is is enlarged. No pericardial effusion.  CHEST WALL AND LOWER NECK: 1.7 cm calcified thyroid nodule.  VISUALIZED UPPER ABDOMEN: Cholelithiasis.  BONES: Benign vertebral body osseous hemangioma at T12.    IMPRESSION:  No evidence of pulmonary embolism.    Patchy mosaic attenuation of the lung fields, likely related to sequela   of pulmonary edema or underlying small airway disease.Underlying early   infectious process not excluded.    --- End of Report ---  NKECHI CALLE MD; Attending Radiologist  This document has been electronically signed. Mar 28 2023  2:37AM  ===============================  < from: Xray Chest 1 View AP/PA. (03.27.23 @ 20:26) >    ACC: [id number] EXAM:  XR CHEST 1 VIEW   ORDERED BY: SUNITA VICTOR     PROCEDURE DATE:  03/27/2023          INTERPRETATION:  INDICATION: Short of breath    Portable chest 8:21 PM    COMPARISON: 9/24/2021    Overlying EKG leads and wires.    FINDINGS:  Heart/Vascular: The mediastinum, hilum and aorta are within normal limits   for projection. Heart size borderline enlarged  Pulmonary: Midline trachea. There is mild pulmonary venous congestion.   There is no focal consolidation, pneumothorax or gross pleural effusion.  Bones: There is no fracture.  Lines and catheter: None    Impression:    Mild pulmonary venous congestion.    --- End of Report ---            KJ PALOMO DO; Attending Radiologist  This document has been electronically signed. Mar 28 2023  9:31AM    < end of copied text >        ===============================  ECHO:    < from: TTE Echo Complete w/o Contrast w/ Doppler (09.21.21 @ 08:09) >   Impression     Summary     The mitral valve leaflets appear thickened.   Moderate mitral annular calcification is present.   Moderate (2+) mitral regurgitation is present.   Fibrocalcific changes noted to the Aortic valve leaflets with preserved   leaflet excursion.   Mild to Moderate Tricuspid regurgitation is present.   Normal appearing pulmonic valve structure and function.   The left atriumis mildly dilated.   Mild concentric left ventricular hypertrophy is present.   Estimated left ventricular ejection fraction is 60 %.   Normal appearing right atrium.   Normal appearing right ventricle structure and function.     Signature     ----------------------------------------------------------------   Electronically signed by Gladis Willard MD(Interpreting   physician) on 09/21/2021 11:52 AM   ----------------------------------------------------------------    < end of copied text >  ===============================  OUTPATIENT CARDIAC TESTING PER DR. KELLER:    Cardiology Summary    ECG: 3/1/2023. Sinus Rhythm. Left axis -anterior fascicular block. Nonspecific IVCD. Old septal infarct.     Cardiac Cath/PCI: 5/31/18. Normal coronary arteries. Moderate pulmonary hypertension.   Elevated pulmonary capillary wedge pressure.     ===============================  Gwyn Rubén, M.D.  Cardiology, [facility]  Cell:   Office:   [phone] ([facility] Office)  [phone] ([facility] Office)    ===============================

## 2023-03-29 NOTE — PROGRESS NOTE ADULT - SUBJECTIVE AND OBJECTIVE BOX
Subjective:  no distress  sitting in chair    MEDICATIONS  (STANDING):  atorvastatin 80 milliGRAM(s) Oral at bedtime  ciprofloxacin  0.3% Ophthalmic Solution 2 Drop(s) Both EYES every 12 hours  citalopram 20 milliGRAM(s) Oral daily  dextrose 5%. 1000 milliLiter(s) (50 mL/Hr) IV Continuous <Continuous>  dextrose 5%. 1000 milliLiter(s) (100 mL/Hr) IV Continuous <Continuous>  dextrose 50% Injectable 25 Gram(s) IV Push once  dextrose 50% Injectable 12.5 Gram(s) IV Push once  dextrose 50% Injectable 25 Gram(s) IV Push once  enoxaparin Injectable 40 milliGRAM(s) SubCutaneous every 12 hours  furosemide   Injectable 40 milliGRAM(s) IV Push every 12 hours  glucagon  Injectable 1 milliGRAM(s) IntraMuscular once  insulin lispro (ADMELOG) corrective regimen sliding scale   SubCutaneous three times a day before meals  levothyroxine 137 MICROGram(s) Oral daily  lisinopril 20 milliGRAM(s) Oral daily  nebivolol 5 milliGRAM(s) Oral daily  oxybutynin 5 milliGRAM(s) Oral daily    MEDICATIONS  (PRN):  acetaminophen     Tablet .. 650 milliGRAM(s) Oral every 6 hours PRN Temp greater or equal to 38.5C (101.3F)  aluminum hydroxide/magnesium hydroxide/simethicone Suspension 30 milliLiter(s) Oral every 6 hours PRN Dyspepsia  dextrose Oral Gel 15 Gram(s) Oral once PRN Blood Glucose LESS THAN 70 milliGRAM(s)/deciliter  melatonin 3 milliGRAM(s) Oral at bedtime PRN Insomnia  zolpidem 5 milliGRAM(s) Oral at bedtime PRN Insomnia      Allergies    Allergy Status Unknown    Intolerances    epinephrine (Other)      REVIEW OF SYSTEMS:    CONSTITUTIONAL:  As per HPI.  HEENT:  Eyes:  No diplopia or blurred vision. ENT:  No earache, sore throat or runny nose.  CARDIOVASCULAR:  No pressure, squeezing, tightness, heaviness or aching about the chest, neck, axilla or epigastrium.  RESPIRATORY:  No cough, shortness of breath, PND or orthopnea.  GASTROINTESTINAL:  No nausea, vomiting or diarrhea.  GENITOURINARY:  No dysuria, frequency or urgency.  MUSCULOSKELETAL:  no joint pain, deformity, tenderness  EXTREMITIES: no clubbing cyanosis,edema  SKIN:  No change in skin, hair or nails.  NEUROLOGIC:  No paresthesias, fasciculations, seizures or weakness.  PSYCHIATRIC:  No disorder of thought or mood.  ENDOCRINE:  No heat or cold intolerance, polyuria or polydipsia.  HEMATOLOGICAL:  No easy bruising or bleedings:    Vital Signs Last 24 Hrs  T(C): 36.7 (28 Mar 2023 20:57), Max: 36.8 (28 Mar 2023 15:41)  T(F): 98.1 (28 Mar 2023 20:57), Max: 98.2 (28 Mar 2023 15:41)  HR: 48 (28 Mar 2023 20:57) (48 - 57)  BP: 143/51 (28 Mar 2023 20:57) (143/51 - 144/58)  BP(mean): --  RR: 18 (28 Mar 2023 20:57) (18 - 18)  SpO2: 94% (28 Mar 2023 20:57) (94% - 97%)    Parameters below as of 28 Mar 2023 20:57  Patient On (Oxygen Delivery Method): nasal cannula        PHYSICAL EXAMINATION:  SKIN: no rashes  HEAD: NC/AT  EYES: PERRLA, EOMI  NECK:  Supple. No lymphadenopathy. Jugular venous pressure not elevated. Carotids equal.   HEART:   S1S2 reg  CHEST:  fine basilar crackles  ABDOMEN:  Soft and nontender.   EXTREMITIES:  1+ edema  NEURO: AAO x 3, no focal deficts       LABS:                        13.8   8.49  )-----------( 215      ( 27 Mar 2023 19:18 )             45.1     03-28    x   |  x   |  x   ----------------------------<  119<H>  x    |  x   |  x     Ca    9.6      27 Mar 2023 19:18            RADIOLOGY & ADDITIONAL TESTS:

## 2023-03-30 DIAGNOSIS — R00.1 BRADYCARDIA, UNSPECIFIED: ICD-10-CM

## 2023-03-30 LAB
ANION GAP SERPL CALC-SCNC: 4 MMOL/L — LOW (ref 5–17)
BASOPHILS # BLD AUTO: 0.07 K/UL — SIGNIFICANT CHANGE UP (ref 0–0.2)
BASOPHILS NFR BLD AUTO: 1 % — SIGNIFICANT CHANGE UP (ref 0–2)
BUN SERPL-MCNC: 16 MG/DL — SIGNIFICANT CHANGE UP (ref 7–23)
CALCIUM SERPL-MCNC: 9 MG/DL — SIGNIFICANT CHANGE UP (ref 8.5–10.1)
CHLORIDE SERPL-SCNC: 106 MMOL/L — SIGNIFICANT CHANGE UP (ref 96–108)
CO2 SERPL-SCNC: 31 MMOL/L — SIGNIFICANT CHANGE UP (ref 22–31)
CREAT SERPL-MCNC: 0.69 MG/DL — SIGNIFICANT CHANGE UP (ref 0.5–1.3)
EGFR: 86 ML/MIN/1.73M2 — SIGNIFICANT CHANGE UP
EOSINOPHIL # BLD AUTO: 0.32 K/UL — SIGNIFICANT CHANGE UP (ref 0–0.5)
EOSINOPHIL NFR BLD AUTO: 4.4 % — SIGNIFICANT CHANGE UP (ref 0–6)
GLUCOSE BLDC GLUCOMTR-MCNC: 137 MG/DL — HIGH (ref 70–99)
GLUCOSE BLDC GLUCOMTR-MCNC: 148 MG/DL — HIGH (ref 70–99)
GLUCOSE BLDC GLUCOMTR-MCNC: 162 MG/DL — HIGH (ref 70–99)
GLUCOSE SERPL-MCNC: 138 MG/DL — HIGH (ref 70–99)
HCT VFR BLD CALC: 45.3 % — HIGH (ref 34.5–45)
HGB BLD-MCNC: 13.6 G/DL — SIGNIFICANT CHANGE UP (ref 11.5–15.5)
IMM GRANULOCYTES NFR BLD AUTO: 0.1 % — SIGNIFICANT CHANGE UP (ref 0–0.9)
LYMPHOCYTES # BLD AUTO: 0.8 K/UL — LOW (ref 1–3.3)
LYMPHOCYTES # BLD AUTO: 11 % — LOW (ref 13–44)
MAGNESIUM SERPL-MCNC: 2 MG/DL — SIGNIFICANT CHANGE UP (ref 1.6–2.6)
MCHC RBC-ENTMCNC: 27.7 PG — SIGNIFICANT CHANGE UP (ref 27–34)
MCHC RBC-ENTMCNC: 30 GM/DL — LOW (ref 32–36)
MCV RBC AUTO: 92.3 FL — SIGNIFICANT CHANGE UP (ref 80–100)
MONOCYTES # BLD AUTO: 0.53 K/UL — SIGNIFICANT CHANGE UP (ref 0–0.9)
MONOCYTES NFR BLD AUTO: 7.3 % — SIGNIFICANT CHANGE UP (ref 2–14)
NEUTROPHILS # BLD AUTO: 5.52 K/UL — SIGNIFICANT CHANGE UP (ref 1.8–7.4)
NEUTROPHILS NFR BLD AUTO: 76.2 % — SIGNIFICANT CHANGE UP (ref 43–77)
PHOSPHATE SERPL-MCNC: 3.6 MG/DL — SIGNIFICANT CHANGE UP (ref 2.5–4.5)
PLATELET # BLD AUTO: 161 K/UL — SIGNIFICANT CHANGE UP (ref 150–400)
POTASSIUM SERPL-MCNC: 3.5 MMOL/L — SIGNIFICANT CHANGE UP (ref 3.5–5.3)
POTASSIUM SERPL-SCNC: 3.5 MMOL/L — SIGNIFICANT CHANGE UP (ref 3.5–5.3)
RBC # BLD: 4.91 M/UL — SIGNIFICANT CHANGE UP (ref 3.8–5.2)
RBC # FLD: 16.2 % — HIGH (ref 10.3–14.5)
SODIUM SERPL-SCNC: 141 MMOL/L — SIGNIFICANT CHANGE UP (ref 135–145)
WBC # BLD: 7.25 K/UL — SIGNIFICANT CHANGE UP (ref 3.8–10.5)
WBC # FLD AUTO: 7.25 K/UL — SIGNIFICANT CHANGE UP (ref 3.8–10.5)

## 2023-03-30 PROCEDURE — 99233 SBSQ HOSP IP/OBS HIGH 50: CPT

## 2023-03-30 RX ADMIN — Medication 40 MILLIGRAM(S): at 06:08

## 2023-03-30 RX ADMIN — Medication 137 MICROGRAM(S): at 06:08

## 2023-03-30 RX ADMIN — Medication 3 MILLIGRAM(S): at 21:19

## 2023-03-30 RX ADMIN — BENZOCAINE AND MENTHOL 1 LOZENGE: 5; 1 LIQUID ORAL at 01:40

## 2023-03-30 RX ADMIN — ENOXAPARIN SODIUM 40 MILLIGRAM(S): 100 INJECTION SUBCUTANEOUS at 21:20

## 2023-03-30 RX ADMIN — CITALOPRAM 20 MILLIGRAM(S): 10 TABLET, FILM COATED ORAL at 10:32

## 2023-03-30 RX ADMIN — Medication 2 DROP(S): at 21:20

## 2023-03-30 RX ADMIN — Medication 1: at 12:48

## 2023-03-30 RX ADMIN — Medication 40 MILLIGRAM(S): at 17:15

## 2023-03-30 RX ADMIN — NEBIVOLOL HYDROCHLORIDE 5 MILLIGRAM(S): 5 TABLET ORAL at 10:34

## 2023-03-30 RX ADMIN — ATORVASTATIN CALCIUM 80 MILLIGRAM(S): 80 TABLET, FILM COATED ORAL at 21:18

## 2023-03-30 RX ADMIN — LISINOPRIL 40 MILLIGRAM(S): 2.5 TABLET ORAL at 10:33

## 2023-03-30 NOTE — PROGRESS NOTE ADULT - SUBJECTIVE AND OBJECTIVE BOX
Patient seen and examined  still on 02  remains volume overloaded  on IV lasix    Review of Systems:  General:denies fever chills, headache, weakness  HEENT: denies blurry vision,diffculty swallowing, difficulty hearing, tinnitus  Cardiovascular: denies chest pain  ,palpitations  Pulmonary:denies shortness of breath, cough, wheezing, hemoptysis  Gastrointestinal: denies abdominal pain, constipation, diarrhea,nausea , vomiting, hematochezia  : denies hematuria, dysuria, or incontinence  Neurological: denies weakness, numbness , tingling, dizziness, tremors  MSK: denies muscle pain, difficulty ambulating, swelling, back pain  skin: denies skin rash, itching, burning, or  skin lesions  Psychiatrical: denies mood disturbances, anxierty, feeling depressed, depression , or difficulty sleeping    Objective:  Vitals  T(C): 36.5 (03-30-23 @ 09:19), Max: 36.7 (03-29-23 @ 15:51)  HR: 51 (03-30-23 @ 09:19) (51 - 65)  BP: 116/60 (03-30-23 @ 09:19) (116/60 - 137/69)  RR: 18 (03-30-23 @ 09:19) (18 - 18)  SpO2: 97% (03-30-23 @ 09:19) (91% - 97%)    Physical Exam:  General: comfortable, no acute distress  HEENT: Atraumatic, no LAD, trachea midline, PERRLA  Cardiovascular: normal s1s2, no murmurs, gallops or fricition rubs  Pulmonary: crackles, mild no wheezing , rhonchi  Gastrointestinal: soft non tender non distended, no masses felt, no organomegally  Muscloskeletal: + lower extremity edema, intact bilateral lower extremity pulses  Neurological: CN II-12 intact. No focal weakness  Psychiatrical: normal mood, cooperative  SKIN: no rash, lesions or ulcers    Labs:                          13.6   7.25  )-----------( 161      ( 30 Mar 2023 07:20 )             45.3     03-30    141  |  106  |  16  ----------------------------<  138<H>  3.5   |  31  |  0.69    Ca    9.0      30 Mar 2023 07:20  Phos  3.6     03-30  Mg     2.0     03-30              Active Medications  MEDICATIONS  (STANDING):  atorvastatin 80 milliGRAM(s) Oral at bedtime  benzocaine/menthol Lozenge 1 Lozenge Oral every 4 hours  ciprofloxacin  0.3% Ophthalmic Solution 2 Drop(s) Both EYES every 12 hours  citalopram 20 milliGRAM(s) Oral daily  dextrose 5%. 1000 milliLiter(s) (50 mL/Hr) IV Continuous <Continuous>  dextrose 5%. 1000 milliLiter(s) (100 mL/Hr) IV Continuous <Continuous>  dextrose 50% Injectable 25 Gram(s) IV Push once  dextrose 50% Injectable 12.5 Gram(s) IV Push once  dextrose 50% Injectable 25 Gram(s) IV Push once  enoxaparin Injectable 40 milliGRAM(s) SubCutaneous every 12 hours  furosemide   Injectable 40 milliGRAM(s) IV Push every 12 hours  glucagon  Injectable 1 milliGRAM(s) IntraMuscular once  hydrocodone/homatropine Syrup 5 milliLiter(s) Oral once  insulin lispro (ADMELOG) corrective regimen sliding scale   SubCutaneous three times a day before meals  levothyroxine 137 MICROGram(s) Oral daily  lisinopril 40 milliGRAM(s) Oral daily  oxybutynin 5 milliGRAM(s) Oral daily    MEDICATIONS  (PRN):  acetaminophen     Tablet .. 650 milliGRAM(s) Oral every 6 hours PRN Temp greater or equal to 38.5C (101.3F)  aluminum hydroxide/magnesium hydroxide/simethicone Suspension 30 milliLiter(s) Oral every 6 hours PRN Dyspepsia  dextrose Oral Gel 15 Gram(s) Oral once PRN Blood Glucose LESS THAN 70 milliGRAM(s)/deciliter  hydrocortisone 1% Cream 1 Application(s) Topical two times a day PRN Rash and/or Itching  melatonin 3 milliGRAM(s) Oral at bedtime PRN Insomnia  zolpidem 5 milliGRAM(s) Oral at bedtime PRN Insomnia

## 2023-03-30 NOTE — PROGRESS NOTE ADULT - SUBJECTIVE AND OBJECTIVE BOX
HPI:  Pt is a pleasant 79 y/o female w/ PMHx of Anxiety, knee osteoarthritis,   Hypertension, Hypothyroidism, Morbid obesity, Panic attacks, Diabetes mellitus II,   Urine incontinence recently started on  lasix  ( Cardio)   presents to [facility] with increasing shortness of breath.   She is been evaluated fo SOB and poss ( undocumented ) ambulation hypoxia.   Pt has signs of generalized fluid overload and admits trying to keep well    hydrated at home. She has +3 pitting edema, loud murmur,   poss early pulm edema, and despite a low BNP   I will consider this CHF related/ Mitral odette most likely.   (28 Mar 2023 09:41)    Consulted for CHF.  IV lasix started & echo ordered.  Reviewed symptoms w/ pt: dyspnea gradually worsening over the past 2-3 weeks.  Pt reports worsening LE edema, no weight gain, possible orthopnea.  No chest pain, palpitations, lightheadness or other cardiac symptoms.  Dyspnea has improved substantially w/ diuretic therapy.      3/28/23: no chest pain, SOB improved with iv lasix  3/30/23: no complaints, no SOB; Tele: sinus bernardino 50s - dc nebivolol     MEDICATIONS:  OUTPATIENT:  Home Medications:  aspirin 325 mg oral tablet: 1 tab(s) orally once a day (20 Sep 2021 20:11)  Bystolic 5 mg oral tablet: 1 tab(s) orally once a day (20 Sep 2021 20:11)  Crestor 40 mg oral tablet: 1 tab(s) orally once a day (20 Sep 2021 20:11)  escitalopram 20 mg oral tablet: 1 tab(s) orally once a day (20 Sep 2021 20:11)  furosemide 40 mg oral tablet: 1 tab(s) orally once a day (20 Sep 2021 20:11)  Januvia 100 mg oral tablet: 1 tab(s) orally once a day (20 Sep 2021 20:11)  levothyroxine 150 mcg (0.15 mg) oral capsule: every other day (3 times a week) (20 Sep 2021 20:11)  levothyroxine 175 mcg (0.175 mg) oral tablet: every other day (4 times a week) (20 Sep 2021 20:11)  nateglinide 120 mg oral tablet: 1 tab(s) orally 3 times a day (before meals) (20 Sep 2021 20:11)  Toviaz 4 mg oral tablet, extended release: 1 tab(s) orally once a day  (20 Sep 2021 20:11)    MEDICATIONS  (STANDING):  atorvastatin 80 milliGRAM(s) Oral at bedtime  benzocaine/menthol Lozenge 1 Lozenge Oral every 4 hours  ciprofloxacin  0.3% Ophthalmic Solution 2 Drop(s) Both EYES every 12 hours  citalopram 20 milliGRAM(s) Oral daily  dextrose 5%. 1000 milliLiter(s) (100 mL/Hr) IV Continuous <Continuous>  dextrose 5%. 1000 milliLiter(s) (50 mL/Hr) IV Continuous <Continuous>  dextrose 50% Injectable 25 Gram(s) IV Push once  dextrose 50% Injectable 12.5 Gram(s) IV Push once  dextrose 50% Injectable 25 Gram(s) IV Push once  enoxaparin Injectable 40 milliGRAM(s) SubCutaneous every 12 hours  furosemide   Injectable 40 milliGRAM(s) IV Push every 12 hours  glucagon  Injectable 1 milliGRAM(s) IntraMuscular once  hydrocodone/homatropine Syrup 5 milliLiter(s) Oral once  insulin lispro (ADMELOG) corrective regimen sliding scale   SubCutaneous three times a day before meals  levothyroxine 137 MICROGram(s) Oral daily  lisinopril 40 milliGRAM(s) Oral daily  oxybutynin 5 milliGRAM(s) Oral daily      MEDICATIONS  (PRN):  acetaminophen     Tablet .. 650 milliGRAM(s) Oral every 6 hours PRN Temp greater or equal to 38.5C (101.3F)  aluminum hydroxide/magnesium hydroxide/simethicone Suspension 30 milliLiter(s) Oral every 6 hours PRN Dyspepsia  dextrose Oral Gel 15 Gram(s) Oral once PRN Blood Glucose LESS THAN 70 milliGRAM(s)/deciliter  zolpidem 5 milliGRAM(s) Oral at bedtime PRN Insomnia    MEDICATIONS  (PRN):  acetaminophen     Tablet .. 650 milliGRAM(s) Oral every 6 hours PRN Temp greater or equal to 38.5C (101.3F)  aluminum hydroxide/magnesium hydroxide/simethicone Suspension 30 milliLiter(s) Oral every 6 hours PRN Dyspepsia  dextrose Oral Gel 15 Gram(s) Oral once PRN Blood Glucose LESS THAN 70 milliGRAM(s)/deciliter  zolpidem 5 milliGRAM(s) Oral at bedtime PRN Insomnia      PHYSICAL EXAM:    Constitutional: NAD, awake   HEENT: PERR, EOMI,  No oral cyananosis.  Neck:  supple,  No JVD  Respiratory: Breath sounds are clear bilaterally, No wheezing, rales or rhonchi  Cardiovascular: S1 and S2, sinus bradycardia, no Murmurs, gallops or rubs  Gastrointestinal: Bowel Sounds present, soft, nontender.   Extremities: +2 peripheral edema. No clubbing or cyanosis.  Vascular: 2+ peripheral pulses  Neurological: A/O x 3, no focal deficits      ===============================  ===============================  LABS:                          13.6   7.25  )-----------( 161      ( 30 Mar 2023 07:20 )             45.3     03-30    141  |  106  |  16  ----------------------------<  138<H>  3.5   |  31  |  0.69    Ca    9.0      30 Mar 2023 07:20  Phos  3.6     03-30  Mg     2.0     03-30      - TroponinI hsT: <-13.68                        13.8   8.49  )-----------( 215      ( 27 Mar 2023 19:18 )             45.1     28 Mar 2023 15:45    x      |  x      |  x      ----------------------------<  119    x       |  x      |  x      27 Mar 2023 19:18    142    |  107    |  15     ----------------------------<  134    3.7     |  32     |  0.77     Ca    9.6        27 Mar 2023 19:18      ===============================  ===============================  CARDIAC BIOMARKERS:  -------  -BNP VALUES:  09-22 @ 07:44  Pro Bnp 103  09-21 @ 07:18  Pro Bnp 165  09-20 @ 12:04  Pro Bnp 241    -------  -TROPONIN VALUES:   Troponin I, High Sensitivity Result: 13.68 ng/L (03-27-23 @ 19:18)  Troponin I, Serum: <0.015 ng/mL [0.015 - 0.045] (09-20-21 @ 12:04)    ===============================  ===============================  EKG: NSR no ischemic changes.  ===============================  RADIOLOGY:  ACC: [id number] [facility]   ORDERED BY:   SUNITA VICTOR     PROCEDURE DATE:  03/28/2023          INTERPRETATION:  CLINICAL INFORMATION: Rule out PE    COMPARISON: CT chest 9/21/2021.    CONTRAST/COMPLICATIONS:  IV Contrast: Omnipaque 350  80 cc administered   20 cc discarded  Oral Contrast: NONE  Complications: None reported at time of study completion    PROCEDURE:  CT Angiography of the Chest.  Sagittal and coronal reformats were performed as well as 3D (MIP)   reconstructions.    FINDINGS:    LUNGS AND AIRWAYS: Patent central airways.  Lungs demonstrate patchy   mosaic attenuation. Bibasilar atelectasis.  PLEURA: No pleural effusion.  MEDIASTINUM AND ARMOND: Mediastinal and axillary lymphadenopathy noted.  VESSELS: Within normal limits. Mild reflux of contrast into the   intrahepatic IVC and hepatic veins.  HEART: Heart size is is enlarged. No pericardial effusion.  CHEST WALL AND LOWER NECK: 1.7 cm calcified thyroid nodule.  VISUALIZED UPPER ABDOMEN: Cholelithiasis.  BONES: Benign vertebral body osseous hemangioma at T12.    IMPRESSION:  No evidence of pulmonary embolism.    Patchy mosaic attenuation of the lung fields, likely related to sequela   of pulmonary edema or underlying small airway disease.Underlying early   infectious process not excluded.    --- End of Report ---  NKECHI CALLE MD; Attending Radiologist  This document has been electronically signed. Mar 28 2023  2:37AM  ===============================  < from: Xray Chest 1 View AP/PA. (03.27.23 @ 20:26) >    ACC: [id number] EXAM:  XR CHEST 1 VIEW   ORDERED BY: SUNITA VICTOR     PROCEDURE DATE:  03/27/2023          INTERPRETATION:  INDICATION: Short of breath    Portable chest 8:21 PM    COMPARISON: 9/24/2021    Overlying EKG leads and wires.    FINDINGS:  Heart/Vascular: The mediastinum, hilum and aorta are within normal limits   for projection. Heart size borderline enlarged  Pulmonary: Midline trachea. There is mild pulmonary venous congestion.   There is no focal consolidation, pneumothorax or gross pleural effusion.  Bones: There is no fracture.  Lines and catheter: None    Impression:    Mild pulmonary venous congestion.    --- End of Report ---            KJ PALOMO DO; Attending Radiologist  This document has been electronically signed. Mar 28 2023  9:31AM    < end of copied text >        ===============================  ECHO:    < from: TTE Echo Complete w/o Contrast w/ Doppler (09.21.21 @ 08:09) >   Impression     Summary     The mitral valve leaflets appear thickened.   Moderate mitral annular calcification is present.   Moderate (2+) mitral regurgitation is present.   Fibrocalcific changes noted to the Aortic valve leaflets with preserved   leaflet excursion.   Mild to Moderate Tricuspid regurgitation is present.   Normal appearing pulmonic valve structure and function.   The left atriumis mildly dilated.   Mild concentric left ventricular hypertrophy is present.   Estimated left ventricular ejection fraction is 60 %.   Normal appearing right atrium.   Normal appearing right ventricle structure and function.     Signature     ----------------------------------------------------------------   Electronically signed by Gladis Willard MD(Interpreting   physician) on 09/21/2021 11:52 AM   ----------------------------------------------------------------    < end of copied text >  ===============================  OUTPATIENT CARDIAC TESTING PER DR. KELLER:    Cardiology Summary    ECG: 3/1/2023. Sinus Rhythm. Left axis -anterior fascicular block. Nonspecific IVCD. Old septal infarct.     Cardiac Cath/PCI: 5/31/18. Normal coronary arteries. Moderate pulmonary hypertension.   Elevated pulmonary capillary wedge pressure.         ===============================    < from: TTE Echo Complete w/o Contrast w/ Doppler (03.29.23 @ 14:24) >     Summary     Moderate to severe mitral annular calcification is present.   The mitral valve leaflets appear thickened.   The posterior leaflet appears at least partially flail.   The mean transmitral gradient is 4 mmHg.   At lest moderate, highly eccentric mitral valve insufficiency is noted.   The aortic valve appears mildly calcified. Valve opening seems to be   normal.   The tricuspid valve leaflets are thin and pliable; valve motion is   normal.   Trace to mild tricuspid valve regurgitation is present.   The left atrium is at the upper limits of normal in size.   Mild concentric left ventricular hypertrophy is present.   The left ventricle is normal in size, wall motion, and contractility.  Estimated left ventricular ejection fraction is 60-65 %.   Normal appearing right atrium.   Normal appearing right ventricle structure and function.     Signature     ----------------------------------------------------------------   Electronically signed by Kala Cortez MD(Interpreting   physician) on 03/29/2023 07:28 PM   --------------------------------------------    < end of copied text >   REASON VIR VISIT: SOB, CHF    HPI:  79 y/o female w/ PMHx of pulmonary edema, moderate mitral regurgitation, Anxiety, knee osteoarthritis, Hypertension, Hypothyroidism, Morbid obesity, Panic attacks, Diabetes mellitus, urine incontinence admitted with CHF exacerbation.    3/28/23: no chest pain, SOB improved with iv lasix  3/30/23: no complaints, no SOB; Tele: sinus bernardino 50s - dc nebivolol     MEDICATIONS:  OUTPATIENT:  Home Medications:  aspirin 325 mg oral tablet: 1 tab(s) orally once a day (20 Sep 2021 20:11)  Bystolic 5 mg oral tablet: 1 tab(s) orally once a day (20 Sep 2021 20:11)  Crestor 40 mg oral tablet: 1 tab(s) orally once a day (20 Sep 2021 20:11)  escitalopram 20 mg oral tablet: 1 tab(s) orally once a day (20 Sep 2021 20:11)  furosemide 40 mg oral tablet: 1 tab(s) orally once a day (20 Sep 2021 20:11)  Januvia 100 mg oral tablet: 1 tab(s) orally once a day (20 Sep 2021 20:11)  levothyroxine 150 mcg (0.15 mg) oral capsule: every other day (3 times a week) (20 Sep 2021 20:11)  levothyroxine 175 mcg (0.175 mg) oral tablet: every other day (4 times a week) (20 Sep 2021 20:11)  nateglinide 120 mg oral tablet: 1 tab(s) orally 3 times a day (before meals) (20 Sep 2021 20:11)  Toviaz 4 mg oral tablet, extended release: 1 tab(s) orally once a day  (20 Sep 2021 20:11)    MEDICATIONS  (STANDING):  atorvastatin 80 milliGRAM(s) Oral at bedtime  benzocaine/menthol Lozenge 1 Lozenge Oral every 4 hours  ciprofloxacin  0.3% Ophthalmic Solution 2 Drop(s) Both EYES every 12 hours  citalopram 20 milliGRAM(s) Oral daily  dextrose 5%. 1000 milliLiter(s) (100 mL/Hr) IV Continuous <Continuous>  dextrose 5%. 1000 milliLiter(s) (50 mL/Hr) IV Continuous <Continuous>  dextrose 50% Injectable 25 Gram(s) IV Push once  dextrose 50% Injectable 12.5 Gram(s) IV Push once  dextrose 50% Injectable 25 Gram(s) IV Push once  enoxaparin Injectable 40 milliGRAM(s) SubCutaneous every 12 hours  furosemide   Injectable 40 milliGRAM(s) IV Push every 12 hours  glucagon  Injectable 1 milliGRAM(s) IntraMuscular once  hydrocodone/homatropine Syrup 5 milliLiter(s) Oral once  insulin lispro (ADMELOG) corrective regimen sliding scale   SubCutaneous three times a day before meals  levothyroxine 137 MICROGram(s) Oral daily  lisinopril 40 milliGRAM(s) Oral daily  oxybutynin 5 milliGRAM(s) Oral daily      MEDICATIONS  (PRN):  acetaminophen     Tablet .. 650 milliGRAM(s) Oral every 6 hours PRN Temp greater or equal to 38.5C (101.3F)  aluminum hydroxide/magnesium hydroxide/simethicone Suspension 30 milliLiter(s) Oral every 6 hours PRN Dyspepsia  dextrose Oral Gel 15 Gram(s) Oral once PRN Blood Glucose LESS THAN 70 milliGRAM(s)/deciliter  zolpidem 5 milliGRAM(s) Oral at bedtime PRN Insomnia    MEDICATIONS  (PRN):  acetaminophen     Tablet .. 650 milliGRAM(s) Oral every 6 hours PRN Temp greater or equal to 38.5C (101.3F)  aluminum hydroxide/magnesium hydroxide/simethicone Suspension 30 milliLiter(s) Oral every 6 hours PRN Dyspepsia  dextrose Oral Gel 15 Gram(s) Oral once PRN Blood Glucose LESS THAN 70 milliGRAM(s)/deciliter  zolpidem 5 milliGRAM(s) Oral at bedtime PRN Insomnia      PHYSICAL EXAM:    Constitutional: NAD, awake   HEENT: PERR, EOMI,  No oral cyananosis.  Neck:  supple,  No JVD  Respiratory: Breath sounds are clear bilaterally, No wheezing, rales or rhonchi  Cardiovascular: S1 and S2, sinus bradycardia, no Murmurs, gallops or rubs  Gastrointestinal: Bowel Sounds present, soft, nontender.   Extremities: +2 peripheral edema. No clubbing or cyanosis.  Vascular: 2+ peripheral pulses  Neurological: A/O x 3, no focal deficits      ===============================  ===============================  LABS:                          13.6   7.25  )-----------( 161      ( 30 Mar 2023 07:20 )             45.3     03-30    141  |  106  |  16  ----------------------------<  138<H>  3.5   |  31  |  0.69    Ca    9.0      30 Mar 2023 07:20  Phos  3.6     03-30  Mg     2.0     03-30      - TroponinI hsT: <-13.68                        13.8   8.49  )-----------( 215      ( 27 Mar 2023 19:18 )             45.1     28 Mar 2023 15:45    x      |  x      |  x      ----------------------------<  119    x       |  x      |  x      27 Mar 2023 19:18    142    |  107    |  15     ----------------------------<  134    3.7     |  32     |  0.77     Ca    9.6        27 Mar 2023 19:18      ===============================  ===============================  CARDIAC BIOMARKERS:  -------  -BNP VALUES:  09-22 @ 07:44  Pro Bnp 103  09-21 @ 07:18  Pro Bnp 165  09-20 @ 12:04  Pro Bnp 241    -------  -TROPONIN VALUES:   Troponin I, High Sensitivity Result: 13.68 ng/L (03-27-23 @ 19:18)  Troponin I, Serum: <0.015 ng/mL [0.015 - 0.045] (09-20-21 @ 12:04)    ===============================  ===============================  EKG: NSR no ischemic changes.  ===============================  RADIOLOGY:  ACC: [id number] EXAM:  CT ANGIO CHEST PULM [facility]   ORDERED BY:   SUNITA VICTOR     PROCEDURE DATE:  03/28/2023          INTERPRETATION:  CLINICAL INFORMATION: Rule out PE    COMPARISON: CT chest 9/21/2021.    CONTRAST/COMPLICATIONS:  IV Contrast: Omnipaque 350  80 cc administered   20 cc discarded  Oral Contrast: NONE  Complications: None reported at time of study completion    PROCEDURE:  CT Angiography of the Chest.  Sagittal and coronal reformats were performed as well as 3D (MIP)   reconstructions.    FINDINGS:    LUNGS AND AIRWAYS: Patent central airways.  Lungs demonstrate patchy   mosaic attenuation. Bibasilar atelectasis.  PLEURA: No pleural effusion.  MEDIASTINUM AND ARMOND: Mediastinal and axillary lymphadenopathy noted.  VESSELS: Within normal limits. Mild reflux of contrast into the   intrahepatic IVC and hepatic veins.  HEART: Heart size is is enlarged. No pericardial effusion.  CHEST WALL AND LOWER NECK: 1.7 cm calcified thyroid nodule.  VISUALIZED UPPER ABDOMEN: Cholelithiasis.  BONES: Benign vertebral body osseous hemangioma at T12.    IMPRESSION:  No evidence of pulmonary embolism.    Patchy mosaic attenuation of the lung fields, likely related to sequela   of pulmonary edema or underlying small airway disease.Underlying early   infectious process not excluded.    --- End of Report ---  NKECIH CALLE MD; Attending Radiologist  This document has been electronically signed. Mar 28 2023  2:37AM  ===============================  < from: Xray Chest 1 View AP/PA. (03.27.23 @ 20:26) >    ACC: [id number] EXAM:  XR CHEST 1 VIEW   ORDERED BY: SUNITA VICTOR     PROCEDURE DATE:  03/27/2023          INTERPRETATION:  INDICATION: Short of breath    Portable chest 8:21 PM    COMPARISON: 9/24/2021    Overlying EKG leads and wires.    FINDINGS:  Heart/Vascular: The mediastinum, hilum and aorta are within normal limits   for projection. Heart size borderline enlarged  Pulmonary: Midline trachea. There is mild pulmonary venous congestion.   There is no focal consolidation, pneumothorax or gross pleural effusion.  Bones: There is no fracture.  Lines and catheter: None    Impression:    Mild pulmonary venous congestion.    --- End of Report ---            KJ PALOMO DO; Attending Radiologist  This document has been electronically signed. Mar 28 2023  9:31AM    < end of copied text >        ===============================  ECHO:    < from: TTE Echo Complete w/o Contrast w/ Doppler (09.21.21 @ 08:09) >   Impression     Summary     The mitral valve leaflets appear thickened.   Moderate mitral annular calcification is present.   Moderate (2+) mitral regurgitation is present.   Fibrocalcific changes noted to the Aortic valve leaflets with preserved   leaflet excursion.   Mild to Moderate Tricuspid regurgitation is present.   Normal appearing pulmonic valve structure and function.   The left atriumis mildly dilated.   Mild concentric left ventricular hypertrophy is present.   Estimated left ventricular ejection fraction is 60 %.   Normal appearing right atrium.   Normal appearing right ventricle structure and function.     Signature     ----------------------------------------------------------------   Electronically signed by Gladis Willard MD(Interpreting   physician) on 09/21/2021 11:52 AM   ----------------------------------------------------------------    < end of copied text >  ===============================  OUTPATIENT CARDIAC TESTING PER DR. KELLER:    Cardiology Summary    ECG: 3/1/2023. Sinus Rhythm. Left axis -anterior fascicular block. Nonspecific IVCD. Old septal infarct.     Cardiac Cath/PCI: 5/31/18. Normal coronary arteries. Moderate pulmonary hypertension.   Elevated pulmonary capillary wedge pressure.         ===============================    < from: TTE Echo Complete w/o Contrast w/ Doppler (03.29.23 @ 14:24) >     Summary     Moderate to severe mitral annular calcification is present.   The mitral valve leaflets appear thickened.   The posterior leaflet appears at least partially flail.   The mean transmitral gradient is 4 mmHg.   At lest moderate, highly eccentric mitral valve insufficiency is noted.   The aortic valve appears mildly calcified. Valve opening seems to be   normal.   The tricuspid valve leaflets are thin and pliable; valve motion is   normal.   Trace to mild tricuspid valve regurgitation is present.   The left atrium is at the upper limits of normal in size.   Mild concentric left ventricular hypertrophy is present.   The left ventricle is normal in size, wall motion, and contractility.  Estimated left ventricular ejection fraction is 60-65 %.   Normal appearing right atrium.   Normal appearing right ventricle structure and function.     Signature     ----------------------------------------------------------------   Electronically signed by Kala Cortez MD(Interpreting   physician) on 03/29/2023 07:28 PM   --------------------------------------------    < end of copied text >

## 2023-03-31 LAB
ANION GAP SERPL CALC-SCNC: 3 MMOL/L — LOW (ref 5–17)
BASE EXCESS BLDA CALC-SCNC: 11.8 MMOL/L — HIGH (ref -2–3)
BASOPHILS # BLD AUTO: 0.04 K/UL — SIGNIFICANT CHANGE UP (ref 0–0.2)
BASOPHILS NFR BLD AUTO: 0.7 % — SIGNIFICANT CHANGE UP (ref 0–2)
BLOOD GAS COMMENTS ARTERIAL: SIGNIFICANT CHANGE UP
BUN SERPL-MCNC: 16 MG/DL — SIGNIFICANT CHANGE UP (ref 7–23)
CALCIUM SERPL-MCNC: 9.4 MG/DL — SIGNIFICANT CHANGE UP (ref 8.5–10.1)
CHLORIDE SERPL-SCNC: 105 MMOL/L — SIGNIFICANT CHANGE UP (ref 96–108)
CO2 BLDA-SCNC: 40 MMOL/L — HIGH (ref 19–24)
CO2 SERPL-SCNC: 33 MMOL/L — HIGH (ref 22–31)
CREAT SERPL-MCNC: 0.66 MG/DL — SIGNIFICANT CHANGE UP (ref 0.5–1.3)
EGFR: 87 ML/MIN/1.73M2 — SIGNIFICANT CHANGE UP
EOSINOPHIL # BLD AUTO: 0.31 K/UL — SIGNIFICANT CHANGE UP (ref 0–0.5)
EOSINOPHIL NFR BLD AUTO: 5.1 % — SIGNIFICANT CHANGE UP (ref 0–6)
GLUCOSE BLDC GLUCOMTR-MCNC: 108 MG/DL — HIGH (ref 70–99)
GLUCOSE BLDC GLUCOMTR-MCNC: 133 MG/DL — HIGH (ref 70–99)
GLUCOSE BLDC GLUCOMTR-MCNC: 164 MG/DL — HIGH (ref 70–99)
GLUCOSE BLDC GLUCOMTR-MCNC: 169 MG/DL — HIGH (ref 70–99)
GLUCOSE SERPL-MCNC: 153 MG/DL — HIGH (ref 70–99)
HCO3 BLDA-SCNC: 38 MMOL/L — HIGH (ref 21–28)
HCT VFR BLD CALC: 44.2 % — SIGNIFICANT CHANGE UP (ref 34.5–45)
HGB BLD-MCNC: 13.4 G/DL — SIGNIFICANT CHANGE UP (ref 11.5–15.5)
IMM GRANULOCYTES NFR BLD AUTO: 0.2 % — SIGNIFICANT CHANGE UP (ref 0–0.9)
LYMPHOCYTES # BLD AUTO: 0.76 K/UL — LOW (ref 1–3.3)
LYMPHOCYTES # BLD AUTO: 12.4 % — LOW (ref 13–44)
MAGNESIUM SERPL-MCNC: 1.9 MG/DL — SIGNIFICANT CHANGE UP (ref 1.6–2.6)
MCHC RBC-ENTMCNC: 27.5 PG — SIGNIFICANT CHANGE UP (ref 27–34)
MCHC RBC-ENTMCNC: 30.3 GM/DL — LOW (ref 32–36)
MCV RBC AUTO: 90.8 FL — SIGNIFICANT CHANGE UP (ref 80–100)
MONOCYTES # BLD AUTO: 0.41 K/UL — SIGNIFICANT CHANGE UP (ref 0–0.9)
MONOCYTES NFR BLD AUTO: 6.7 % — SIGNIFICANT CHANGE UP (ref 2–14)
NEUTROPHILS # BLD AUTO: 4.59 K/UL — SIGNIFICANT CHANGE UP (ref 1.8–7.4)
NEUTROPHILS NFR BLD AUTO: 74.9 % — SIGNIFICANT CHANGE UP (ref 43–77)
PCO2 BLDA: 58 MMHG — HIGH (ref 32–45)
PH BLDA: 7.43 — SIGNIFICANT CHANGE UP (ref 7.35–7.45)
PLATELET # BLD AUTO: 165 K/UL — SIGNIFICANT CHANGE UP (ref 150–400)
PO2 BLDA: 68 MMHG — LOW (ref 83–108)
POTASSIUM SERPL-MCNC: 3.6 MMOL/L — SIGNIFICANT CHANGE UP (ref 3.5–5.3)
POTASSIUM SERPL-SCNC: 3.6 MMOL/L — SIGNIFICANT CHANGE UP (ref 3.5–5.3)
RBC # BLD: 4.87 M/UL — SIGNIFICANT CHANGE UP (ref 3.8–5.2)
RBC # FLD: 15.9 % — HIGH (ref 10.3–14.5)
SAO2 % BLDA: 94 % — SIGNIFICANT CHANGE UP (ref 94–98)
SODIUM SERPL-SCNC: 141 MMOL/L — SIGNIFICANT CHANGE UP (ref 135–145)
WBC # BLD: 6.12 K/UL — SIGNIFICANT CHANGE UP (ref 3.8–10.5)
WBC # FLD AUTO: 6.12 K/UL — SIGNIFICANT CHANGE UP (ref 3.8–10.5)

## 2023-03-31 PROCEDURE — 99233 SBSQ HOSP IP/OBS HIGH 50: CPT

## 2023-03-31 RX ADMIN — Medication 137 MICROGRAM(S): at 06:31

## 2023-03-31 RX ADMIN — CITALOPRAM 20 MILLIGRAM(S): 10 TABLET, FILM COATED ORAL at 10:25

## 2023-03-31 RX ADMIN — ENOXAPARIN SODIUM 40 MILLIGRAM(S): 100 INJECTION SUBCUTANEOUS at 10:23

## 2023-03-31 RX ADMIN — Medication 2 DROP(S): at 10:23

## 2023-03-31 RX ADMIN — Medication 3 MILLIGRAM(S): at 21:27

## 2023-03-31 RX ADMIN — Medication 40 MILLIGRAM(S): at 06:31

## 2023-03-31 RX ADMIN — ATORVASTATIN CALCIUM 80 MILLIGRAM(S): 80 TABLET, FILM COATED ORAL at 21:27

## 2023-03-31 RX ADMIN — ENOXAPARIN SODIUM 40 MILLIGRAM(S): 100 INJECTION SUBCUTANEOUS at 21:28

## 2023-03-31 RX ADMIN — Medication 40 MILLIGRAM(S): at 18:02

## 2023-03-31 RX ADMIN — LISINOPRIL 40 MILLIGRAM(S): 2.5 TABLET ORAL at 10:24

## 2023-03-31 RX ADMIN — Medication 1: at 18:04

## 2023-03-31 RX ADMIN — Medication 2 DROP(S): at 21:27

## 2023-03-31 RX ADMIN — Medication 5 MILLIGRAM(S): at 10:25

## 2023-03-31 NOTE — PROGRESS NOTE ADULT - SUBJECTIVE AND OBJECTIVE BOX
REASON VIR VISIT: SOB, CHF    HPI:  81 y/o female w/ PMHx of pulmonary edema, moderate mitral regurgitation, Anxiety, knee osteoarthritis, Hypertension, Hypothyroidism, Morbid obesity, Panic attacks, Diabetes mellitus, urine incontinence admitted with CHF exacerbation.    3/28/23: no chest pain, SOB improved with iv lasix  3/30/23: no complaints, no SOB; Tele: sinus bernardino 50s - dc nebivolol   3/31/23: no cardiac complaints, Tele: NSR 60s    MEDICATIONS:  OUTPATIENT:  Home Medications:  aspirin 325 mg oral tablet: 1 tab(s) orally once a day (20 Sep 2021 20:11)  Bystolic 5 mg oral tablet: 1 tab(s) orally once a day (20 Sep 2021 20:11)  Crestor 40 mg oral tablet: 1 tab(s) orally once a day (20 Sep 2021 20:11)  escitalopram 20 mg oral tablet: 1 tab(s) orally once a day (20 Sep 2021 20:11)  furosemide 40 mg oral tablet: 1 tab(s) orally once a day (20 Sep 2021 20:11)  Januvia 100 mg oral tablet: 1 tab(s) orally once a day (20 Sep 2021 20:11)  levothyroxine 150 mcg (0.15 mg) oral capsule: every other day (3 times a week) (20 Sep 2021 20:11)  levothyroxine 175 mcg (0.175 mg) oral tablet: every other day (4 times a week) (20 Sep 2021 20:11)  nateglinide 120 mg oral tablet: 1 tab(s) orally 3 times a day (before meals) (20 Sep 2021 20:11)  Toviaz 4 mg oral tablet, extended release: 1 tab(s) orally once a day  (20 Sep 2021 20:11)    MEDICATIONS  (STANDING):  atorvastatin 80 milliGRAM(s) Oral at bedtime  benzocaine/menthol Lozenge 1 Lozenge Oral every 4 hours  ciprofloxacin  0.3% Ophthalmic Solution 2 Drop(s) Both EYES every 12 hours  citalopram 20 milliGRAM(s) Oral daily  dextrose 5%. 1000 milliLiter(s) (50 mL/Hr) IV Continuous <Continuous>  dextrose 5%. 1000 milliLiter(s) (100 mL/Hr) IV Continuous <Continuous>  dextrose 50% Injectable 25 Gram(s) IV Push once  dextrose 50% Injectable 12.5 Gram(s) IV Push once  dextrose 50% Injectable 25 Gram(s) IV Push once  enoxaparin Injectable 40 milliGRAM(s) SubCutaneous every 12 hours  furosemide   Injectable 40 milliGRAM(s) IV Push every 12 hours  glucagon  Injectable 1 milliGRAM(s) IntraMuscular once  hydrocodone/homatropine Syrup 5 milliLiter(s) Oral once  insulin lispro (ADMELOG) corrective regimen sliding scale   SubCutaneous three times a day before meals  levothyroxine 137 MICROGram(s) Oral daily  lisinopril 40 milliGRAM(s) Oral daily  oxybutynin 5 milliGRAM(s) Oral daily        MEDICATIONS  (PRN):  acetaminophen     Tablet .. 650 milliGRAM(s) Oral every 6 hours PRN Temp greater or equal to 38.5C (101.3F)  aluminum hydroxide/magnesium hydroxide/simethicone Suspension 30 milliLiter(s) Oral every 6 hours PRN Dyspepsia  dextrose Oral Gel 15 Gram(s) Oral once PRN Blood Glucose LESS THAN 70 milliGRAM(s)/deciliter  zolpidem 5 milliGRAM(s) Oral at bedtime PRN Insomnia    MEDICATIONS  (PRN):  acetaminophen     Tablet .. 650 milliGRAM(s) Oral every 6 hours PRN Temp greater or equal to 38.5C (101.3F)  aluminum hydroxide/magnesium hydroxide/simethicone Suspension 30 milliLiter(s) Oral every 6 hours PRN Dyspepsia  dextrose Oral Gel 15 Gram(s) Oral once PRN Blood Glucose LESS THAN 70 milliGRAM(s)/deciliter  zolpidem 5 milliGRAM(s) Oral at bedtime PRN Insomnia      PHYSICAL EXAM:    Constitutional: NAD, awake   HEENT: PERR, EOMI,  No oral cyananosis.  Neck:  supple,  No JVD  Respiratory: Breath sounds are clear bilaterally, No wheezing, rales or rhonchi  Cardiovascular: S1 and S2, sinus bradycardia, no Murmurs, gallops or rubs  Gastrointestinal: Bowel Sounds present, soft, nontender.   Extremities: +2 peripheral edema. No clubbing or cyanosis.  Vascular: 2+ peripheral pulses  Neurological: A/O x 3, no focal deficits      ===============================  ===============================  LABS:                          13.6   7.25  )-----------( 161      ( 30 Mar 2023 07:20 )             45.3     03-30    141  |  106  |  16  ----------------------------<  138<H>  3.5   |  31  |  0.69    Ca    9.0      30 Mar 2023 07:20  Phos  3.6     03-30  Mg     2.0     03-30      - TroponinI hsT: <-13.68                        13.8   8.49  )-----------( 215      ( 27 Mar 2023 19:18 )             45.1     28 Mar 2023 15:45    x      |  x      |  x      ----------------------------<  119    x       |  x      |  x      27 Mar 2023 19:18    142    |  107    |  15     ----------------------------<  134    3.7     |  32     |  0.77     Ca    9.6        27 Mar 2023 19:18      ===============================  ===============================  CARDIAC BIOMARKERS:  -------  -BNP VALUES:  09-22 @ 07:44  Pro Bnp 103  09-21 @ 07:18  Pro Bnp 165  09-20 @ 12:04  Pro Bnp 241    -------  -TROPONIN VALUES:   Troponin I, High Sensitivity Result: 13.68 ng/L (03-27-23 @ 19:18)  Troponin I, Serum: <0.015 ng/mL [0.015 - 0.045] (09-20-21 @ 12:04)    ===============================  ===============================  EKG: NSR no ischemic changes.  ===============================  RADIOLOGY:  ACC: [id number] EXAM:  [facility]   ORDERED BY:   SUNITA VICTOR     PROCEDURE DATE:  03/28/2023          INTERPRETATION:  CLINICAL INFORMATION: Rule out PE    COMPARISON: CT chest 9/21/2021.    CONTRAST/COMPLICATIONS:  IV Contrast: Omnipaque 350  80 cc administered   20 cc discarded  Oral Contrast: NONE  Complications: None reported at time of study completion    PROCEDURE:  CT Angiography of the Chest.  Sagittal and coronal reformats were performed as well as 3D (MIP)   reconstructions.    FINDINGS:    LUNGS AND AIRWAYS: Patent central airways.  Lungs demonstrate patchy   mosaic attenuation. Bibasilar atelectasis.  PLEURA: No pleural effusion.  MEDIASTINUM AND ARMOND: Mediastinal and axillary lymphadenopathy noted.  VESSELS: Within normal limits. Mild reflux of contrast into the   intrahepatic IVC and hepatic veins.  HEART: Heart size is is enlarged. No pericardial effusion.  CHEST WALL AND LOWER NECK: 1.7 cm calcified thyroid nodule.  VISUALIZED UPPER ABDOMEN: Cholelithiasis.  BONES: Benign vertebral body osseous hemangioma at T12.    IMPRESSION:  No evidence of pulmonary embolism.    Patchy mosaic attenuation of the lung fields, likely related to sequela   of pulmonary edema or underlying small airway disease.Underlying early   infectious process not excluded.    --- End of Report ---  NKECHI CALLE MD; Attending Radiologist  This document has been electronically signed. Mar 28 2023  2:37AM  ===============================  < from: Xray Chest 1 View AP/PA. (03.27.23 @ 20:26) >    ACC: [id number] EXAM:  XR CHEST 1 VIEW   ORDERED BY: SUNITA VICTOR     PROCEDURE DATE:  03/27/2023          INTERPRETATION:  INDICATION: Short of breath    Portable chest 8:21 PM    COMPARISON: 9/24/2021    Overlying EKG leads and wires.    FINDINGS:  Heart/Vascular: The mediastinum, hilum and aorta are within normal limits   for projection. Heart size borderline enlarged  Pulmonary: Midline trachea. There is mild pulmonary venous congestion.   There is no focal consolidation, pneumothorax or gross pleural effusion.  Bones: There is no fracture.  Lines and catheter: None    Impression:    Mild pulmonary venous congestion.    --- End of Report ---            KJ PALOMO DO; Attending Radiologist  This document has been electronically signed. Mar 28 2023  9:31AM    < end of copied text >        ===============================  ECHO:    < from: TTE Echo Complete w/o Contrast w/ Doppler (09.21.21 @ 08:09) >   Impression     Summary     The mitral valve leaflets appear thickened.   Moderate mitral annular calcification is present.   Moderate (2+) mitral regurgitation is present.   Fibrocalcific changes noted to the Aortic valve leaflets with preserved   leaflet excursion.   Mild to Moderate Tricuspid regurgitation is present.   Normal appearing pulmonic valve structure and function.   The left atriumis mildly dilated.   Mild concentric left ventricular hypertrophy is present.   Estimated left ventricular ejection fraction is 60 %.   Normal appearing right atrium.   Normal appearing right ventricle structure and function.     Signature     ----------------------------------------------------------------   Electronically signed by Gladis Willard MD(Interpreting   physician) on 09/21/2021 11:52 AM   ----------------------------------------------------------------    < end of copied text >  ===============================  OUTPATIENT CARDIAC TESTING PER DR. KELLER:    Cardiology Summary    ECG: 3/1/2023. Sinus Rhythm. Left axis -anterior fascicular block. Nonspecific IVCD. Old septal infarct.     Cardiac Cath/PCI: 5/31/18. Normal coronary arteries. Moderate pulmonary hypertension.   Elevated pulmonary capillary wedge pressure.         ===============================    < from: TTE Echo Complete w/o Contrast w/ Doppler (03.29.23 @ 14:24) >     Summary     Moderate to severe mitral annular calcification is present.   The mitral valve leaflets appear thickened.   The posterior leaflet appears at least partially flail.   The mean transmitral gradient is 4 mmHg.   At lest moderate, highly eccentric mitral valve insufficiency is noted.   The aortic valve appears mildly calcified. Valve opening seems to be   normal.   The tricuspid valve leaflets are thin and pliable; valve motion is   normal.   Trace to mild tricuspid valve regurgitation is present.   The left atrium is at the upper limits of normal in size.   Mild concentric left ventricular hypertrophy is present.   The left ventricle is normal in size, wall motion, and contractility.  Estimated left ventricular ejection fraction is 60-65 %.   Normal appearing right atrium.   Normal appearing right ventricle structure and function.     Signature     ----------------------------------------------------------------   Electronically signed by Kala Cortez MD(Interpreting   physician) on 03/29/2023 07:28 PM   --------------------------------------------    < end of copied text >

## 2023-03-31 NOTE — PROGRESS NOTE ADULT - SUBJECTIVE AND OBJECTIVE BOX
Subjective:  less sob  better w diuresis    MEDICATIONS  (STANDING):  atorvastatin 80 milliGRAM(s) Oral at bedtime  benzocaine/menthol Lozenge 1 Lozenge Oral every 4 hours  ciprofloxacin  0.3% Ophthalmic Solution 2 Drop(s) Both EYES every 12 hours  citalopram 20 milliGRAM(s) Oral daily  dextrose 5%. 1000 milliLiter(s) (50 mL/Hr) IV Continuous <Continuous>  dextrose 5%. 1000 milliLiter(s) (100 mL/Hr) IV Continuous <Continuous>  dextrose 50% Injectable 25 Gram(s) IV Push once  dextrose 50% Injectable 12.5 Gram(s) IV Push once  dextrose 50% Injectable 25 Gram(s) IV Push once  enoxaparin Injectable 40 milliGRAM(s) SubCutaneous every 12 hours  furosemide   Injectable 40 milliGRAM(s) IV Push every 12 hours  glucagon  Injectable 1 milliGRAM(s) IntraMuscular once  hydrocodone/homatropine Syrup 5 milliLiter(s) Oral once  insulin lispro (ADMELOG) corrective regimen sliding scale   SubCutaneous three times a day before meals  levothyroxine 137 MICROGram(s) Oral daily  lisinopril 40 milliGRAM(s) Oral daily  oxybutynin 5 milliGRAM(s) Oral daily    MEDICATIONS  (PRN):  acetaminophen     Tablet .. 650 milliGRAM(s) Oral every 6 hours PRN Temp greater or equal to 38.5C (101.3F)  aluminum hydroxide/magnesium hydroxide/simethicone Suspension 30 milliLiter(s) Oral every 6 hours PRN Dyspepsia  dextrose Oral Gel 15 Gram(s) Oral once PRN Blood Glucose LESS THAN 70 milliGRAM(s)/deciliter  hydrocortisone 1% Cream 1 Application(s) Topical two times a day PRN Rash and/or Itching  melatonin 3 milliGRAM(s) Oral at bedtime PRN Insomnia  zolpidem 5 milliGRAM(s) Oral at bedtime PRN Insomnia      Allergies    Allergy Status Unknown    Intolerances    epinephrine (Other)      REVIEW OF SYSTEMS:    CONSTITUTIONAL:  As per HPI.  HEENT:  Eyes:  No diplopia or blurred vision. ENT:  No earache, sore throat or runny nose.  CARDIOVASCULAR:  No pressure, squeezing, tightness, heaviness or aching about the chest, neck, axilla or epigastrium.  RESPIRATORY:  No cough, shortness of breath, PND or orthopnea.  GASTROINTESTINAL:  No nausea, vomiting or diarrhea.  GENITOURINARY:  No dysuria, frequency or urgency.  MUSCULOSKELETAL:  no joint pain, deformity, tenderness  EXTREMITIES: no clubbing cyanosis,edema  SKIN:  No change in skin, hair or nails.  NEUROLOGIC:  No paresthesias, fasciculations, seizures or weakness.  PSYCHIATRIC:  No disorder of thought or mood.  ENDOCRINE:  No heat or cold intolerance, polyuria or polydipsia.  HEMATOLOGICAL:  No easy bruising or bleedings:    Vital Signs Last 24 Hrs  T(C): 36.7 (31 Mar 2023 06:00), Max: 36.7 (30 Mar 2023 15:44)  T(F): 98.1 (31 Mar 2023 06:00), Max: 98.1 (31 Mar 2023 06:00)  HR: 60 (31 Mar 2023 06:00) (50 - 60)  BP: 127/65 (31 Mar 2023 06:00) (116/60 - 133/42)  BP(mean): --  RR: 18 (31 Mar 2023 06:00) (18 - 19)  SpO2: 92% (31 Mar 2023 06:00) (92% - 97%)    Parameters below as of 31 Mar 2023 06:00  Patient On (Oxygen Delivery Method): nasal cannula  O2 Flow (L/min): 2      PHYSICAL EXAMINATION:  SKIN: no rashes  HEAD: NC/AT  EYES: PERRLA, EOMI  EARS: TM's intact  NOSE: no abnormalities  NECK:  Supple. No lymphadenopathy. Jugular venous pressure not elevated. Carotids equal.   HEART:  S1S2 reg  CHEST:  decreased bs bases w fine crackles  ABDOMEN:  Soft and nontender.   EXTREMITIES:  bilat edema w skin rash  NEURO: AAO x 3, no focal deficts       LABS:                        13.4   6.12  )-----------( 165      ( 31 Mar 2023 06:53 )             44.2     03-31    141  |  105  |  16  ----------------------------<  153<H>  3.6   |  33<H>  |  0.66    Ca    9.4      31 Mar 2023 06:53  Phos  3.6     03-30  Mg     1.9     03-31            RADIOLOGY & ADDITIONAL TESTS:

## 2023-03-31 NOTE — PROGRESS NOTE ADULT - SUBJECTIVE AND OBJECTIVE BOX
Patient seen and examined  ambulating  feels deeper breaths of air   on 02 on exertion  vitals stable  noted ALKALOSIS on bmp    Review of Systems:  General:denies fever chills, headache, weakness  HEENT: denies blurry vision,diffculty swallowing, difficulty hearing, tinnitus  Cardiovascular: denies chest pain  ,palpitations  Pulmonary:denies shortness of breath, cough, wheezing, hemoptysis  Gastrointestinal: denies abdominal pain, constipation, diarrhea,nausea , vomiting, hematochezia  : denies hematuria, dysuria, or incontinence  Neurological: denies weakness, numbness , tingling, dizziness, tremors  MSK: denies muscle pain, difficulty ambulating, swelling, back pain  skin: denies skin rash, itching, burning, or  skin lesions  Psychiatrical: denies mood disturbances, anxierty, feeling depressed, depression , or difficulty sleeping    Objective:  Vitals  T(C): 37 (03-31-23 @ 08:48), Max: 37 (03-31-23 @ 08:48)  HR: 64 (03-31-23 @ 08:48) (50 - 64)  BP: 131/66 (03-31-23 @ 08:48) (124/36 - 133/42)  RR: 18 (03-31-23 @ 08:48) (18 - 19)  SpO2: 95% (03-31-23 @ 08:48) (92% - 95%)    Physical Exam:  General: comfortable, no acute distress  HEENT: Atraumatic, no LAD, trachea midline, PERRLA  Cardiovascular: normal s1s2, no murmurs, gallops or fricition rubs  Pulmonary: clear to ausculation Bilaterally, no wheezing , rhonchi  Gastrointestinal: soft non tender non distended, no masses felt, no organomegally  Muscloskeletal: no lower extremity edema, intact bilateral lower extremity pulses  Neurological: CN II-12 intact. No focal weakness  Psychiatrical: normal mood, cooperative  SKIN: no rash, lesions or ulcers    Labs:                          13.4   6.12  )-----------( 165      ( 31 Mar 2023 06:53 )             44.2     03-31    141  |  105  |  16  ----------------------------<  153<H>  3.6   |  33<H>  |  0.66    Ca    9.4      31 Mar 2023 06:53  Phos  3.6     03-30  Mg     1.9     03-31              Active Medications  MEDICATIONS  (STANDING):  atorvastatin 80 milliGRAM(s) Oral at bedtime  benzocaine/menthol Lozenge 1 Lozenge Oral every 4 hours  ciprofloxacin  0.3% Ophthalmic Solution 2 Drop(s) Both EYES every 12 hours  citalopram 20 milliGRAM(s) Oral daily  dextrose 5%. 1000 milliLiter(s) (50 mL/Hr) IV Continuous <Continuous>  dextrose 5%. 1000 milliLiter(s) (100 mL/Hr) IV Continuous <Continuous>  dextrose 50% Injectable 25 Gram(s) IV Push once  dextrose 50% Injectable 12.5 Gram(s) IV Push once  dextrose 50% Injectable 25 Gram(s) IV Push once  enoxaparin Injectable 40 milliGRAM(s) SubCutaneous every 12 hours  furosemide   Injectable 40 milliGRAM(s) IV Push every 12 hours  glucagon  Injectable 1 milliGRAM(s) IntraMuscular once  hydrocodone/homatropine Syrup 5 milliLiter(s) Oral once  insulin lispro (ADMELOG) corrective regimen sliding scale   SubCutaneous three times a day before meals  levothyroxine 137 MICROGram(s) Oral daily  lisinopril 40 milliGRAM(s) Oral daily  oxybutynin 5 milliGRAM(s) Oral daily    MEDICATIONS  (PRN):  acetaminophen     Tablet .. 650 milliGRAM(s) Oral every 6 hours PRN Temp greater or equal to 38.5C (101.3F)  aluminum hydroxide/magnesium hydroxide/simethicone Suspension 30 milliLiter(s) Oral every 6 hours PRN Dyspepsia  dextrose Oral Gel 15 Gram(s) Oral once PRN Blood Glucose LESS THAN 70 milliGRAM(s)/deciliter  hydrocortisone 1% Cream 1 Application(s) Topical two times a day PRN Rash and/or Itching  melatonin 3 milliGRAM(s) Oral at bedtime PRN Insomnia  zolpidem 5 milliGRAM(s) Oral at bedtime PRN Insomnia

## 2023-03-31 NOTE — PROGRESS NOTE ADULT - PROBLEM/PLAN-1
DISPLAY PLAN FREE TEXT
Dermatology Rooming Note    Eliane Lockhart's goals for this visit include:   Chief Complaint   Patient presents with     Hair Loss     hair loss f/u - Amanda states that things have stayed the same     Janis Miller, EMT    
DISPLAY PLAN FREE TEXT
DISPLAY PLAN FREE TEXT

## 2023-03-31 NOTE — PROGRESS NOTE ADULT - PROBLEM SELECTOR PLAN 2
increased lisinopril from 20 mg to 40 mg, dced nebivolol due to bradycardia
increase lisinopril from 20 mg to 40 mg
increased lisinopril from 20 mg to 40 mg, dced nebivolol due to bradycardia

## 2023-03-31 NOTE — CHART NOTE - NSCHARTNOTEFT_GEN_A_CORE
Patient requires home oxygen due to Obesity Hypoventilation  . Patient is otherwise in a chronic stable state.   Patient is aware he/she will be discharged on oxygen and is agreeable. Patient will require concentrator and portable O2 to the home.   Oxygen saturation on room air at rest is 86%.   Oxygen saturation on room air on ambulation decreases to 85%  Oxygen saturation ambulating with nasal cannula O2@ 3L/min improves to 92 percent.

## 2023-03-31 NOTE — PROGRESS NOTE ADULT - NS ATTEND AMEND GEN_ALL_CORE FT
Plan of care discussed with NP. Agree with plan of care
Plan of care reviewed. Agree with plan of care. Will follow up prn.
Patient seen and examined - known to me from outpatient setting.  Continue to diurese with IV Lasix with plans to re-evaluate severity of mitral regurgitation once more euvolemic.

## 2023-03-31 NOTE — PROGRESS NOTE ADULT - PROBLEM SELECTOR PLAN 1
·  Problem: Acute diastolic congestive heart failure.   ·  Recommendation: -Symptoms have improved w/ IV lasix.  Increase to 40 IV BID.  GDMT with BB/ACEI  -fluid restrict 1.5 L/24 hrs, strict Is & Os, daily wts, keep K>4, Mg>2  -Await results of echo.
·  Problem: Acute diastolic congestive heart failure.   ·  Recommendation: -Symptoms have improved w/ IV lasix.  Increased to 40 IV BID.  GDMT limited by bradycardia - cont. ACEI  -fluid restrict 1.5 L/24 hrs, strict Is & Os, daily wts, keep K>4, Mg>2  -Echo - preserved EF 60-65%, mod to severe MV calcification and mode MV insufficiency
·  Problem: Acute diastolic congestive heart failure.   ·  Recommendation: -Symptoms have improved w/ IV lasix.  Increased to 40 IV BID.  GDMT limited by bradycardia - cont. ACEI  -fluid restrict 1.5 L/24 hrs, strict Is & Os, daily wts, keep K>4, Mg>2  -Echo - preserved EF 60-65%, mod to severe MV calcification and mode MV insufficiency

## 2023-04-01 LAB
ANION GAP SERPL CALC-SCNC: 3 MMOL/L — LOW (ref 5–17)
BASOPHILS # BLD AUTO: 0.06 K/UL — SIGNIFICANT CHANGE UP (ref 0–0.2)
BASOPHILS NFR BLD AUTO: 1.1 % — SIGNIFICANT CHANGE UP (ref 0–2)
BUN SERPL-MCNC: 14 MG/DL — SIGNIFICANT CHANGE UP (ref 7–23)
CALCIUM SERPL-MCNC: 9.2 MG/DL — SIGNIFICANT CHANGE UP (ref 8.5–10.1)
CHLORIDE SERPL-SCNC: 103 MMOL/L — SIGNIFICANT CHANGE UP (ref 96–108)
CO2 SERPL-SCNC: 36 MMOL/L — HIGH (ref 22–31)
CREAT SERPL-MCNC: 0.58 MG/DL — SIGNIFICANT CHANGE UP (ref 0.5–1.3)
EGFR: 90 ML/MIN/1.73M2 — SIGNIFICANT CHANGE UP
EOSINOPHIL # BLD AUTO: 0.37 K/UL — SIGNIFICANT CHANGE UP (ref 0–0.5)
EOSINOPHIL NFR BLD AUTO: 6.5 % — HIGH (ref 0–6)
GLUCOSE BLDC GLUCOMTR-MCNC: 134 MG/DL — HIGH (ref 70–99)
GLUCOSE BLDC GLUCOMTR-MCNC: 140 MG/DL — HIGH (ref 70–99)
GLUCOSE BLDC GLUCOMTR-MCNC: 150 MG/DL — HIGH (ref 70–99)
GLUCOSE BLDC GLUCOMTR-MCNC: 160 MG/DL — HIGH (ref 70–99)
GLUCOSE SERPL-MCNC: 158 MG/DL — HIGH (ref 70–99)
HCT VFR BLD CALC: 44.8 % — SIGNIFICANT CHANGE UP (ref 34.5–45)
HGB BLD-MCNC: 13.4 G/DL — SIGNIFICANT CHANGE UP (ref 11.5–15.5)
IMM GRANULOCYTES NFR BLD AUTO: 0.2 % — SIGNIFICANT CHANGE UP (ref 0–0.9)
LYMPHOCYTES # BLD AUTO: 0.73 K/UL — LOW (ref 1–3.3)
LYMPHOCYTES # BLD AUTO: 12.9 % — LOW (ref 13–44)
MAGNESIUM SERPL-MCNC: 2 MG/DL — SIGNIFICANT CHANGE UP (ref 1.6–2.6)
MCHC RBC-ENTMCNC: 27.3 PG — SIGNIFICANT CHANGE UP (ref 27–34)
MCHC RBC-ENTMCNC: 29.9 GM/DL — LOW (ref 32–36)
MCV RBC AUTO: 91.4 FL — SIGNIFICANT CHANGE UP (ref 80–100)
MONOCYTES # BLD AUTO: 0.55 K/UL — SIGNIFICANT CHANGE UP (ref 0–0.9)
MONOCYTES NFR BLD AUTO: 9.7 % — SIGNIFICANT CHANGE UP (ref 2–14)
NEUTROPHILS # BLD AUTO: 3.93 K/UL — SIGNIFICANT CHANGE UP (ref 1.8–7.4)
NEUTROPHILS NFR BLD AUTO: 69.6 % — SIGNIFICANT CHANGE UP (ref 43–77)
PLATELET # BLD AUTO: 165 K/UL — SIGNIFICANT CHANGE UP (ref 150–400)
POTASSIUM SERPL-MCNC: 3.1 MMOL/L — LOW (ref 3.5–5.3)
POTASSIUM SERPL-SCNC: 3.1 MMOL/L — LOW (ref 3.5–5.3)
RBC # BLD: 4.9 M/UL — SIGNIFICANT CHANGE UP (ref 3.8–5.2)
RBC # FLD: 15.9 % — HIGH (ref 10.3–14.5)
SODIUM SERPL-SCNC: 142 MMOL/L — SIGNIFICANT CHANGE UP (ref 135–145)
WBC # BLD: 5.65 K/UL — SIGNIFICANT CHANGE UP (ref 3.8–10.5)
WBC # FLD AUTO: 5.65 K/UL — SIGNIFICANT CHANGE UP (ref 3.8–10.5)

## 2023-04-01 PROCEDURE — 99233 SBSQ HOSP IP/OBS HIGH 50: CPT

## 2023-04-01 RX ORDER — POTASSIUM CHLORIDE 20 MEQ
40 PACKET (EA) ORAL EVERY 4 HOURS
Refills: 0 | Status: COMPLETED | OUTPATIENT
Start: 2023-04-01 | End: 2023-04-01

## 2023-04-01 RX ADMIN — Medication 2 DROP(S): at 09:49

## 2023-04-01 RX ADMIN — Medication 1: at 13:05

## 2023-04-01 RX ADMIN — Medication 3 MILLIGRAM(S): at 21:44

## 2023-04-01 RX ADMIN — ENOXAPARIN SODIUM 40 MILLIGRAM(S): 100 INJECTION SUBCUTANEOUS at 09:48

## 2023-04-01 RX ADMIN — LISINOPRIL 40 MILLIGRAM(S): 2.5 TABLET ORAL at 09:50

## 2023-04-01 RX ADMIN — Medication 5 MILLIGRAM(S): at 09:49

## 2023-04-01 RX ADMIN — Medication 2 DROP(S): at 21:44

## 2023-04-01 RX ADMIN — ATORVASTATIN CALCIUM 80 MILLIGRAM(S): 80 TABLET, FILM COATED ORAL at 21:44

## 2023-04-01 RX ADMIN — Medication 40 MILLIEQUIVALENT(S): at 09:49

## 2023-04-01 RX ADMIN — Medication 40 MILLIGRAM(S): at 06:03

## 2023-04-01 RX ADMIN — ENOXAPARIN SODIUM 40 MILLIGRAM(S): 100 INJECTION SUBCUTANEOUS at 21:44

## 2023-04-01 RX ADMIN — Medication 40 MILLIEQUIVALENT(S): at 13:06

## 2023-04-01 RX ADMIN — Medication 137 MICROGRAM(S): at 06:03

## 2023-04-01 RX ADMIN — Medication 40 MILLIGRAM(S): at 17:18

## 2023-04-01 RX ADMIN — CITALOPRAM 20 MILLIGRAM(S): 10 TABLET, FILM COATED ORAL at 09:49

## 2023-04-01 NOTE — PROGRESS NOTE ADULT - SUBJECTIVE AND OBJECTIVE BOX
HOSPITALIST ATTENDING PROGRESS NOTE    Chart and meds reviewed.  Patient seen and examined.    CC: SOB    Subjective: Feels breathing is improving, Denies CP, palp. Home O2 set up.     All other systems reviewed and found to be negative with the exception of what has been described above.    MEDICATIONS  (STANDING):  atorvastatin 80 milliGRAM(s) Oral at bedtime  benzocaine/menthol Lozenge 1 Lozenge Oral every 4 hours  ciprofloxacin  0.3% Ophthalmic Solution 2 Drop(s) Both EYES every 12 hours  citalopram 20 milliGRAM(s) Oral daily  dextrose 5%. 1000 milliLiter(s) (50 mL/Hr) IV Continuous <Continuous>  dextrose 5%. 1000 milliLiter(s) (100 mL/Hr) IV Continuous <Continuous>  dextrose 50% Injectable 25 Gram(s) IV Push once  dextrose 50% Injectable 12.5 Gram(s) IV Push once  dextrose 50% Injectable 25 Gram(s) IV Push once  enoxaparin Injectable 40 milliGRAM(s) SubCutaneous every 12 hours  furosemide   Injectable 40 milliGRAM(s) IV Push every 12 hours  glucagon  Injectable 1 milliGRAM(s) IntraMuscular once  hydrocodone/homatropine Syrup 5 milliLiter(s) Oral once  insulin lispro (ADMELOG) corrective regimen sliding scale   SubCutaneous three times a day before meals  levothyroxine 137 MICROGram(s) Oral daily  lisinopril 40 milliGRAM(s) Oral daily  oxybutynin 5 milliGRAM(s) Oral daily    MEDICATIONS  (PRN):  acetaminophen     Tablet .. 650 milliGRAM(s) Oral every 6 hours PRN Temp greater or equal to 38.5C (101.3F)  aluminum hydroxide/magnesium hydroxide/simethicone Suspension 30 milliLiter(s) Oral every 6 hours PRN Dyspepsia  dextrose Oral Gel 15 Gram(s) Oral once PRN Blood Glucose LESS THAN 70 milliGRAM(s)/deciliter  hydrocortisone 1% Cream 1 Application(s) Topical two times a day PRN Rash and/or Itching  melatonin 3 milliGRAM(s) Oral at bedtime PRN Insomnia  zolpidem 5 milliGRAM(s) Oral at bedtime PRN Insomnia      VITALS:  T(F): 97.9 (04-01-23 @ 08:50), Max: 98.2 (03-31-23 @ 15:44)  HR: 58 (04-01-23 @ 08:50) (56 - 60)  BP: 133/35 (04-01-23 @ 08:50) (128/66 - 149/59)  RR: 18 (04-01-23 @ 08:50) (18 - 18)  SpO2: 95% (04-01-23 @ 08:50) (93% - 95%)  Wt(kg): --    I&O's Summary    31 Mar 2023 07:01  -  01 Apr 2023 07:00  --------------------------------------------------------  IN: 0 mL / OUT: 5850 mL / NET: -5850 mL        CAPILLARY BLOOD GLUCOSE      POCT Blood Glucose.: 160 mg/dL (01 Apr 2023 13:03)  POCT Blood Glucose.: 150 mg/dL (01 Apr 2023 08:18)  POCT Blood Glucose.: 169 mg/dL (31 Mar 2023 21:26)  POCT Blood Glucose.: 164 mg/dL (31 Mar 2023 17:26)      PHYSICAL EXAM:  Gen: NAD  HEENT:  pupils equal and reactive, EOMI, no oropharyngeal lesions, erythema, exudates, oral thrush  NECK:   supple, no carotid bruits, no palpable lymph nodes, no thyromegaly  CV:  +S1, +S2, regular, no murmurs or rubs  RESP:   lungs clear to auscultation bilaterally, no wheezing, rales, rhonchi, good air entry bilaterally  BREAST:  not examined  GI:  abdomen soft, non-tender, non-distended, normal BS, no bruits, no abdominal masses, no palpable masses  RECTAL:  not examined  :  not examined  MSK:   normal muscle tone, no atrophy, no rigidity, no contractions  EXT:  no clubbing, no cyanosis, no edema, no calf pain, swelling or erythema  VASCULAR:  pulses equal and symmetric in the upper and lower extremities  NEURO:  AAOX3, no focal neurological deficits, follows all commands, able to move extremities spontaneously  SKIN:  no ulcers, lesions or rashes    LABS:                            13.4   5.65  )-----------( 165      ( 01 Apr 2023 07:45 )             44.8     04-01    142  |  103  |  14  ----------------------------<  158<H>  3.1<L>   |  36<H>  |  0.58    Ca    9.2      01 Apr 2023 07:45  Mg     2.0     04-01    ABG - ( 31 Mar 2023 10:54 )  pH, Arterial: 7.43  pH, Blood: x     /  pCO2: 58    /  pO2: 68    / HCO3: 38    / Base Excess: 11.8  /  SaO2: 94        CULTURES:  no new    Additional results/Imaging, I have personally reviewed:  CXR 3/27/23: mild pulmonary venous congestion    CTA PE protocol 3/28/23: No evidence of pulmonary embolism. Patchy mosaic attenuation of the lung fields, likely related to sequela of pulmonary edema or underlying small airway disease.Underlying early infectious process not excluded.    Echo 3/29/23: Moderate to severe mitral annular calcification is present. The mitral valve leaflets appear thickened. The posterior leaflet appears at least partially flail. The mean transmitral gradient is 4 mmHg. At lest moderate, highly eccentric mitral valve insufficiency is noted. The aortic valve appears mildly calcified. Valve opening seems to be normal. The tricuspid valve leaflets are thin and pliable; valve motion is normal. Trace to mild tricuspid valve regurgitation is present. The left atrium is at the upper limits of normal in size. Mild concentric left ventricular hypertrophy is present. The left ventricle is normal in size, wall motion, and contractility. Estimated left ventricular ejection fraction is 60-65 %. Normal appearing right atrium. Normal appearing right ventricle structure and function.    Telemetry, personally reviewed:  4/1/23: sinus 60s, 50s w sleep, d/c tele

## 2023-04-01 NOTE — PROGRESS NOTE ADULT - PROVIDER SPECIALTY LIST ADULT
Hospitalist
Internal Medicine
Internal Medicine
Pulmonology
Internal Medicine
Cardiology
Pulmonology
Cardiology
Pulmonology
Cardiology

## 2023-04-01 NOTE — PROGRESS NOTE ADULT - SUBJECTIVE AND OBJECTIVE BOX
Subjective:  wawake and alert  denies sob    MEDICATIONS  (STANDING):  atorvastatin 80 milliGRAM(s) Oral at bedtime  benzocaine/menthol Lozenge 1 Lozenge Oral every 4 hours  ciprofloxacin  0.3% Ophthalmic Solution 2 Drop(s) Both EYES every 12 hours  citalopram 20 milliGRAM(s) Oral daily  dextrose 5%. 1000 milliLiter(s) (50 mL/Hr) IV Continuous <Continuous>  dextrose 5%. 1000 milliLiter(s) (100 mL/Hr) IV Continuous <Continuous>  dextrose 50% Injectable 25 Gram(s) IV Push once  dextrose 50% Injectable 12.5 Gram(s) IV Push once  dextrose 50% Injectable 25 Gram(s) IV Push once  enoxaparin Injectable 40 milliGRAM(s) SubCutaneous every 12 hours  furosemide   Injectable 40 milliGRAM(s) IV Push every 12 hours  glucagon  Injectable 1 milliGRAM(s) IntraMuscular once  hydrocodone/homatropine Syrup 5 milliLiter(s) Oral once  insulin lispro (ADMELOG) corrective regimen sliding scale   SubCutaneous three times a day before meals  levothyroxine 137 MICROGram(s) Oral daily  lisinopril 40 milliGRAM(s) Oral daily  oxybutynin 5 milliGRAM(s) Oral daily  potassium chloride    Tablet ER 40 milliEquivalent(s) Oral every 4 hours    MEDICATIONS  (PRN):  acetaminophen     Tablet .. 650 milliGRAM(s) Oral every 6 hours PRN Temp greater or equal to 38.5C (101.3F)  aluminum hydroxide/magnesium hydroxide/simethicone Suspension 30 milliLiter(s) Oral every 6 hours PRN Dyspepsia  dextrose Oral Gel 15 Gram(s) Oral once PRN Blood Glucose LESS THAN 70 milliGRAM(s)/deciliter  hydrocortisone 1% Cream 1 Application(s) Topical two times a day PRN Rash and/or Itching  melatonin 3 milliGRAM(s) Oral at bedtime PRN Insomnia  zolpidem 5 milliGRAM(s) Oral at bedtime PRN Insomnia      Allergies    Allergy Status Unknown    Intolerances    epinephrine (Other)      REVIEW OF SYSTEMS:    CONSTITUTIONAL:  As per HPI.  HEENT:  Eyes:  No diplopia or blurred vision. ENT:  No earache, sore throat or runny nose.  CARDIOVASCULAR:  No pressure, squeezing, tightness, heaviness or aching about the chest, neck, axilla or epigastrium.  RESPIRATORY:  No cough, shortness of breath, PND or orthopnea.  GASTROINTESTINAL:  No nausea, vomiting or diarrhea.  GENITOURINARY:  No dysuria, frequency or urgency.  MUSCULOSKELETAL:  no joint pain, deformity, tenderness  EXTREMITIES: no clubbing cyanosis,edema  SKIN:  No change in skin, hair or nails.  NEUROLOGIC:  No paresthesias, fasciculations, seizures or weakness.  PSYCHIATRIC:  No disorder of thought or mood.  ENDOCRINE:  No heat or cold intolerance, polyuria or polydipsia.  HEMATOLOGICAL:  No easy bruising or bleedings:    Vital Signs Last 24 Hrs  T(C): 36.6 (01 Apr 2023 08:50), Max: 36.8 (31 Mar 2023 15:44)  T(F): 97.9 (01 Apr 2023 08:50), Max: 98.2 (31 Mar 2023 15:44)  HR: 58 (01 Apr 2023 08:50) (56 - 60)  BP: 133/35 (01 Apr 2023 08:50) (128/66 - 149/59)  BP(mean): --  RR: 18 (01 Apr 2023 08:50) (18 - 18)  SpO2: 95% (01 Apr 2023 08:50) (93% - 95%)    Parameters below as of 01 Apr 2023 08:50  Patient On (Oxygen Delivery Method): nasal cannula  O2 Flow (L/min): 2      PHYSICAL EXAMINATION:  SKIN: no rashes  HEAD: NC/AT  EYES: PERRLA, EOMI  NECK:  Supple. No lymphadenopathy. Jugular venous pressure not elevated. Carotids equal.   HEART:   S1S2 reg  CHEST:  good air entry; basilar crackles  ABDOMEN:  Soft and nontender.   EXTREMITIES:  no C/C/E; rash improved  NEURO: AAO x 3, no focal deficts       LABS:                        13.4   5.65  )-----------( 165      ( 01 Apr 2023 07:45 )             44.8     04-01    142  |  103  |  14  ----------------------------<  158<H>  3.1<L>   |  36<H>  |  0.58    Ca    9.2      01 Apr 2023 07:45  Mg     2.0     04-01            RADIOLOGY & ADDITIONAL TESTS:

## 2023-04-01 NOTE — PROGRESS NOTE ADULT - PROBLEM SELECTOR PROBLEM 3
Pulmonary hypertension
Pulmonary hypertension
Sinus bradycardia
Pulmonary hypertension
Sinus bradycardia

## 2023-04-01 NOTE — PROGRESS NOTE ADULT - ASSESSMENT
81 y/o female w/ PMHx of Anxiety, knee osteoarthritis, Hypertension, Hypothyroidism, Morbid obesity, Panic attacks, Diabetes mellitus II, Urine incontinence recently started on  lasix  ( Cardio) presents to [place]  ED with increasing shortness of breath. Admitted to [facility] for Congestive Heart failure    Acute Congestive Heart Failure SOB SHANKS chf related to mitral valve reg vs undiagnosed lung condition  was seen at [facility] now  switched to [facility]; was not given a dx of chronic lung ds  r/o worsening Mitral valve pathology   Lasix IV  TTE report pending  cardio and Pulm consulted  ? stress test    * T2DM  resume home meds    * LLE mild erythema is not cellulitis, monitor for now
 81 y/o female w/ PMHx of Anxiety, knee osteoarthritis, Hypertension, Hypothyroidism, Morbid obesity, Panic attacks, Diabetes mellitus II, Urine incontinence recently started on  lasix  ( Cardio) presents to [place]  ED with increasing shortness of breath. Admitted to [facility] for Congestive Heart failure.    #Acute hypoxic resp failure 2/2 acute on chronic HFpEF  -tele  -home O2 set up  -BNP low  -echo as above  -chest imaging c/w CHF  -lasix 40iv BID currently  -appreciate cards input, follows outpt w Jh    #sinus bernardino  -BB stopped    #HTN  -ACEi    #DM  -SSI    #DVT ppx- SQL    Anticipate d/c 4/2, F2F, home O2  [place] [facility]    Acute Congestive Heart Failure SOB SHANKS chf related to mitral valve Regurg w/component OHS  was seen at [facility] now  switched to [facility]; was not given a dx of chronic lung ds  r/o worsening Mitral valve pathology  Lasix IV BID  TTE report noted :Moderate mitral regurg  cardio and Pulm consulted  plan  c/w IV diuresis  may switch to PO tomorrow  NOTES: patient was on home 02 last year (took herself off). Will plan for home 02 on discharge    T2DM  resume home meds    dvt ppx; lovenox    
 81 y/o female w/ PMHx of Anxiety, knee osteoarthritis, Hypertension, Hypothyroidism, Morbid obesity, Panic attacks, Diabetes mellitus II, Urine incontinence recently started on  lasix  ( Cardio) presents to [place]  ED with increasing shortness of breath. Admitted to tele for Congestive Heart failure    Acute Congestive Heart Failure SOB SHANKS chf related to mitral valve reg vs undiagnosed lung condition  was seen at [facility] now  switched to [facility]; was not given a dx of chronic lung ds  r/o worsening Mitral valve pathology  Lasix IV BID  TTE report noted :Moderate mitral regurg  cardio and Pulm consulted  plan  c/w IV diuresis      T2DM  resume home meds    dvt ppx; lovenox    
- on O2  - CT scan shows pulmonary congestion even though low BNP  - leg edema better  - cont on iv lasix  - cardio f/u noted  - echo shows normal LV function (EF 60%); mitral regurg w possible flail leaflet  - mild to moderate pulmonary htn  - has bilat leg rash improved  - abg noted; may need home O2  - dvt proph
 81 y/o female w/ PMHx of Anxiety, knee osteoarthritis, Hypertension, Hypothyroidism, Morbid obesity, Panic attacks, Diabetes mellitus II, Urine incontinence recently started on  lasix  ( Cardio) presents to [place]  ED with increasing shortness of breath. Admitted to [facility] for Congestive Heart failure    Acute Congestive Heart Failure SOB SHANKS chf related to mitral valve Regurg w/component OHS  was seen at [facility] now  switched to [facility]; was not given a dx of chronic lung ds  r/o worsening Mitral valve pathology  Lasix IV BID  TTE report noted :Moderate mitral regurg  cardio and Pulm consulted  plan  c/w IV diuresis  may switch to PO tomorrow  NOTES: patient was on home 02 last year (took herself off). Will plan for home 02 on discharge    T2DM  resume home meds    dvt ppx; lovenox    
- on O2  - CT scan shows pulmonary congestion even though low BNP  - leg edema better  - responding to iv lasix  - cardio f/u noted  - echo results pending.  - cardio eval  - dvt proph
- on O2  - CT scan shows pulmonary congestion even though low BNP  - leg edema better  - responding to iv lasix  - cardio f/u noted  - echo shows normal LV function (EF 60%); mitral regurg w possible flail leaflet  - mild to moderate pulmonary htn  - has bilat leg rash; unlikely to have bilat cellulitis but consider ID eval  - will check abg  - dvt proph

## 2023-04-02 ENCOUNTER — TRANSCRIPTION ENCOUNTER (OUTPATIENT)
Age: 83
End: 2023-04-02

## 2023-04-02 VITALS
SYSTOLIC BLOOD PRESSURE: 127 MMHG | RESPIRATION RATE: 16 BRPM | TEMPERATURE: 98 F | HEART RATE: 53 BPM | DIASTOLIC BLOOD PRESSURE: 48 MMHG | OXYGEN SATURATION: 95 %

## 2023-04-02 LAB
ANION GAP SERPL CALC-SCNC: 1 MMOL/L — LOW (ref 5–17)
BUN SERPL-MCNC: 15 MG/DL — SIGNIFICANT CHANGE UP (ref 7–23)
CALCIUM SERPL-MCNC: 9.7 MG/DL — SIGNIFICANT CHANGE UP (ref 8.5–10.1)
CHLORIDE SERPL-SCNC: 102 MMOL/L — SIGNIFICANT CHANGE UP (ref 96–108)
CO2 SERPL-SCNC: 36 MMOL/L — HIGH (ref 22–31)
CREAT SERPL-MCNC: 0.71 MG/DL — SIGNIFICANT CHANGE UP (ref 0.5–1.3)
EGFR: 84 ML/MIN/1.73M2 — SIGNIFICANT CHANGE UP
GLUCOSE BLDC GLUCOMTR-MCNC: 138 MG/DL — HIGH (ref 70–99)
GLUCOSE BLDC GLUCOMTR-MCNC: 166 MG/DL — HIGH (ref 70–99)
GLUCOSE SERPL-MCNC: 160 MG/DL — HIGH (ref 70–99)
POTASSIUM SERPL-MCNC: 3.6 MMOL/L — SIGNIFICANT CHANGE UP (ref 3.5–5.3)
POTASSIUM SERPL-SCNC: 3.6 MMOL/L — SIGNIFICANT CHANGE UP (ref 3.5–5.3)
SODIUM SERPL-SCNC: 139 MMOL/L — SIGNIFICANT CHANGE UP (ref 135–145)

## 2023-04-02 PROCEDURE — 99239 HOSP IP/OBS DSCHRG MGMT >30: CPT

## 2023-04-02 RX ORDER — NEBIVOLOL HYDROCHLORIDE 5 MG/1
1 TABLET ORAL
Qty: 0 | Refills: 0 | DISCHARGE

## 2023-04-02 RX ADMIN — Medication 137 MICROGRAM(S): at 06:35

## 2023-04-02 RX ADMIN — ENOXAPARIN SODIUM 40 MILLIGRAM(S): 100 INJECTION SUBCUTANEOUS at 09:54

## 2023-04-02 RX ADMIN — CITALOPRAM 20 MILLIGRAM(S): 10 TABLET, FILM COATED ORAL at 10:00

## 2023-04-02 RX ADMIN — Medication 5 MILLIGRAM(S): at 09:58

## 2023-04-02 RX ADMIN — Medication 1: at 11:55

## 2023-04-02 RX ADMIN — Medication 40 MILLIGRAM(S): at 06:35

## 2023-04-02 RX ADMIN — Medication 2 DROP(S): at 10:00

## 2023-04-02 NOTE — DISCHARGE NOTE NURSING/CASE MANAGEMENT/SOCIAL WORK - PATIENT PORTAL LINK FT
You can access the FollowMyHealth Patient Portal offered by [facility] by registering at the following website: [email or web address]. By joining [employer]’s FollowMyHealth portal, you will also be able to view your health information using other applications (apps) compatible with our system.

## 2023-04-02 NOTE — DISCHARGE NOTE PROVIDER - NSDCMRMEDTOKEN_GEN_ALL_CORE_FT
aspirin 325 mg oral tablet: 1 tab(s) orally once a day  Crestor 40 mg oral tablet: 1 tab(s) orally once a day  escitalopram 20 mg oral tablet: 1 tab(s) orally once a day  furosemide 40 mg oral tablet: 1 tab(s) orally once a day  Januvia 100 mg oral tablet: 1 tab(s) orally once a day  levothyroxine 150 mcg (0.15 mg) oral capsule: every other day (3 times a week)  levothyroxine 175 mcg (0.175 mg) oral tablet: every other day (4 times a week)  lisinopril 40 mg oral tablet: 1 tab(s) orally once a day  nateglinide 120 mg oral tablet: 1 tab(s) orally 3 times a day (before meals)  Toviaz 4 mg oral tablet, extended release: 1 tab(s) orally once a day

## 2023-04-02 NOTE — DISCHARGE NOTE PROVIDER - NSDCCPCAREPLAN_GEN_ALL_CORE_FT
PRINCIPAL DISCHARGE DIAGNOSIS  Diagnosis: Acute diastolic congestive heart failure  Assessment and Plan of Treatment: Take your home medications except for bystolic. This was stopped for low heart rate. Follow up with Dr. Peñaloza.

## 2023-04-02 NOTE — DISCHARGE NOTE PROVIDER - NSDCFUSCHEDAPPT_GEN_ALL_CORE_FT
[facility]  CARDIOLOGY [address]  Scheduled Appointment: 04/14/2023    Tirso Peñaloza  [facility]  CARDIOLOGY [address]  Scheduled Appointment: 04/14/2023    Orlando House  [facility]  INTMED [address]  Scheduled Appointment: 06/22/2023

## 2023-04-02 NOTE — DISCHARGE NOTE PROVIDER - CARE PROVIDER_API CALL
Tirso Peñaloza (MD)  Cardiovascular Disease; Internal Medicine  [address], [address]  [place] [address]  Phone: [phone]  Fax: [phone]  Follow Up Time: 1 week

## 2023-04-02 NOTE — DISCHARGE NOTE PROVIDER - HOSPITAL COURSE
CC: SOB  HPI and Hospital Course:  81 y/o female w/ PMHx of Anxiety, knee osteoarthritis, Hypertension, Hypothyroidism, Morbid obesity, Panic attacks, Diabetes mellitus II, Urine incontinence recently started on  lasix  ( Cardio) presents to [place]  ED with increasing shortness of breath. Admitted to [facility] for Congestive Heart failure.    Improved w iv diuresis. BB stopped for bradycardia. See below.     VITALS:  T(F): 97.9 (04-02-23 @ 12:00), Max: 98.3 (04-01-23 @ 16:22)  HR: 53 (04-02-23 @ 12:00) (53 - 67)  BP: 127/48 (04-02-23 @ 12:00) (105/79 - 163/67)  RR: 16 (04-02-23 @ 12:00) (16 - 18)  SpO2: 95% (04-02-23 @ 12:00) (93% - 95%)    PHYSICAL EXAM:  Gen: NAD  HEENT:  pupils equal and reactive, EOMI, no oropharyngeal lesions, erythema, exudates, oral thrush  NECK:   supple, no carotid bruits, no palpable lymph nodes, no thyromegaly  CV:  +S1, +S2, regular, no murmurs or rubs  RESP:   lungs clear to auscultation bilaterally, no wheezing, rales, rhonchi, good air entry bilaterally  BREAST:  not examined  GI:  abdomen soft, non-tender, non-distended, normal BS, no bruits, no abdominal masses, no palpable masses  RECTAL:  not examined  :  not examined  MSK:   normal muscle tone, no atrophy, no rigidity, no contractions  EXT:  no clubbing, no cyanosis, no edema, no calf pain, swelling or erythema  VASCULAR:  pulses equal and symmetric in the upper and lower extremities  NEURO:  AAOX3, no focal neurological deficits, follows all commands, able to move extremities spontaneously  SKIN:  no ulcers, lesions or rashes    CXR 3/27/23: mild pulmonary venous congestion    CTA PE protocol 3/28/23: No evidence of pulmonary embolism. Patchy mosaic attenuation of the lung fields, likely related to sequela of pulmonary edema or underlying small airway disease.Underlying early infectious process not excluded.    Echo 3/29/23: Moderate to severe mitral annular calcification is present. The mitral valve leaflets appear thickened. The posterior leaflet appears at least partially flail. The mean transmitral gradient is 4 mmHg. At lest moderate, highly eccentric mitral valve insufficiency is noted. The aortic valve appears mildly calcified. Valve opening seems to be normal. The tricuspid valve leaflets are thin and pliable; valve motion is normal. Trace to mild tricuspid valve regurgitation is present. The left atrium is at the upper limits of normal in size. Mild concentric left ventricular hypertrophy is present. The left ventricle is normal in size, wall motion, and contractility. Estimated left ventricular ejection fraction is 60-65 %. Normal appearing right atrium. Normal appearing right ventricle structure and function.    #Acute hypoxic resp failure 2/2 acute on chronic HFpEF  -tele  -home O2 set up (had had in past)  -BNP low  -echo as above  -chest imaging c/w CHF  -lasix 40iv BID inpt, d/c on prior home regimen  -appreciate cards input, follows outpt w Jh    #sinus bernardino  -BB stopped    #HTN  -ACEi    #DM  -SSI    #DVT ppx- SQL    F2F, home O2  I have spent 33 min on day of d/c coordinating care.

## 2023-04-04 ENCOUNTER — TRANSCRIPTION ENCOUNTER (OUTPATIENT)
Age: 83
End: 2023-04-04

## 2023-04-04 NOTE — DISCUSSION/SUMMARY
[FreeTextEntry1] : 83 y/o female presents today post hospital discharge admitted with: Acute hypoxic resp failure 2/2 acute on chronic HFpEF with moderate MR with preserved EF 60-65%,\par Symptoms have improved w/diuretics\par GDMT limited by bradycardia - cont. ACEI\par Home O2\par daily weight\par OV 6 weeks with Dr Peñaloza\par \par HTN: BB stopped 2/2 bradycardia CW Lisinopril \par  \par DM: Medical management\par \par

## 2023-04-04 NOTE — HISTORY OF PRESENT ILLNESS
[FreeTextEntry1] : This is an 83 y/o female PMH: HTN, HLD, DM, obesity, pHTN, DHF mitral and tricuspid valve regurgitation, pulmonary edema, bilateral pleural effusions, hypothyroid, anxiety who presents today post hospital follow up admitted with increasing SOB  Acute hypoxic resp failure 2/2 acute on chronic HFpEF\par Dyspnea has improved substantially w/ diuretic therapy.\par \par \par Echo 3/29/23: Moderate to severe mitral annular calcification is present. The\par mitral valve leaflets appear thickened. The posterior leaflet appears at least\par partially flail. The mean transmitral gradient is 4 mmHg. At lest moderate,\par highly eccentric mitral valve insufficiency is noted. The aortic valve appears\par mildly calcified. Valve opening seems to be normal. The tricuspid valve\par leaflets are thin and pliable; valve motion is normal. Trace to mild tricuspid\par valve regurgitation is present. The left atrium is at the upper limits of\par normal in size. Mild concentric left ventricular hypertrophy is present. The\par left ventricle is normal in size, wall motion, and contractility. Estimated\par left ventricular ejection fraction is 60-65 %. Normal appearing right atrium.\par Normal appearing right ventricle structure and function.\par  \par Cardiac Cath/PCI: 5/31/18. Normal coronary arteries. Moderate pulmonary\par hypertension.\par \par  \par \par \par

## 2023-04-05 ENCOUNTER — NON-APPOINTMENT (OUTPATIENT)
Age: 83
End: 2023-04-05

## 2023-04-05 ENCOUNTER — APPOINTMENT (OUTPATIENT)
Dept: CARDIOLOGY | Facility: CLINIC | Age: 83
End: 2023-04-05

## 2023-04-06 ENCOUNTER — TRANSCRIPTION ENCOUNTER (OUTPATIENT)
Age: 83
End: 2023-04-06

## 2023-04-13 ENCOUNTER — APPOINTMENT (OUTPATIENT)
Dept: INTERNAL MEDICINE | Facility: CLINIC | Age: 83
End: 2023-04-13
Payer: MEDICARE

## 2023-04-13 VITALS
TEMPERATURE: 98.2 F | OXYGEN SATURATION: 94 % | SYSTOLIC BLOOD PRESSURE: 130 MMHG | WEIGHT: 264 LBS | HEIGHT: 62 IN | RESPIRATION RATE: 16 BRPM | BODY MASS INDEX: 48.58 KG/M2 | DIASTOLIC BLOOD PRESSURE: 80 MMHG

## 2023-04-13 DIAGNOSIS — R32 UNSPECIFIED URINARY INCONTINENCE: ICD-10-CM

## 2023-04-13 DIAGNOSIS — E87.3 ALKALOSIS: ICD-10-CM

## 2023-04-13 DIAGNOSIS — I27.20 PULMONARY HYPERTENSION, UNSPECIFIED: ICD-10-CM

## 2023-04-13 DIAGNOSIS — I50.31 ACUTE DIASTOLIC (CONGESTIVE) HEART FAILURE: ICD-10-CM

## 2023-04-13 DIAGNOSIS — E03.9 HYPOTHYROIDISM, UNSPECIFIED: ICD-10-CM

## 2023-04-13 DIAGNOSIS — R00.1 BRADYCARDIA, UNSPECIFIED: ICD-10-CM

## 2023-04-13 DIAGNOSIS — Z00.00 ENCOUNTER FOR GENERAL ADULT MEDICAL EXAMINATION W/OUT ABNORMAL FINDINGS: ICD-10-CM

## 2023-04-13 DIAGNOSIS — I11.0 HYPERTENSIVE HEART DISEASE WITH HEART FAILURE: ICD-10-CM

## 2023-04-13 DIAGNOSIS — Z79.82 LONG TERM (CURRENT) USE OF ASPIRIN: ICD-10-CM

## 2023-04-13 DIAGNOSIS — E11.9 TYPE 2 DIABETES MELLITUS WITHOUT COMPLICATIONS: ICD-10-CM

## 2023-04-13 DIAGNOSIS — Z20.822 CONTACT WITH AND (SUSPECTED) EXPOSURE TO COVID-19: ICD-10-CM

## 2023-04-13 DIAGNOSIS — Z79.890 HORMONE REPLACEMENT THERAPY: ICD-10-CM

## 2023-04-13 DIAGNOSIS — F41.0 PANIC DISORDER [EPISODIC PAROXYSMAL ANXIETY]: ICD-10-CM

## 2023-04-13 DIAGNOSIS — E66.2 MORBID (SEVERE) OBESITY WITH ALVEOLAR HYPOVENTILATION: ICD-10-CM

## 2023-04-13 DIAGNOSIS — I38 ENDOCARDITIS, VALVE UNSPECIFIED: ICD-10-CM

## 2023-04-13 DIAGNOSIS — M17.9 OSTEOARTHRITIS OF KNEE, UNSPECIFIED: ICD-10-CM

## 2023-04-13 DIAGNOSIS — Z79.84 LONG TERM (CURRENT) USE OF ORAL HYPOGLYCEMIC DRUGS: ICD-10-CM

## 2023-04-13 DIAGNOSIS — J96.01 ACUTE RESPIRATORY FAILURE WITH HYPOXIA: ICD-10-CM

## 2023-04-13 DIAGNOSIS — I34.0 NONRHEUMATIC MITRAL (VALVE) INSUFFICIENCY: ICD-10-CM

## 2023-04-13 PROCEDURE — 99214 OFFICE O/P EST MOD 30 MIN: CPT | Mod: 25

## 2023-04-13 PROCEDURE — 99495 TRANSJ CARE MGMT MOD F2F 14D: CPT | Mod: 25

## 2023-04-13 RX ORDER — ROSUVASTATIN CALCIUM 40 MG/1
40 TABLET, FILM COATED ORAL DAILY
Qty: 90 | Refills: 1 | Status: ACTIVE | COMMUNITY
Start: 2022-11-22 | End: 1900-01-01

## 2023-04-13 NOTE — PLAN
[FreeTextEntry1] : 1. Referral for Endocrinology, A1c 7.3 on multiple agents. \par \par 2. Educated on importance of daily weights, low sodium diet and fluid restriction.\par \par 3. F/U with Dr. Peñaloza tomorrow with Echo.\par \par 4. Rx given for complete fasting blood work to be drawn 1 week prior to June appt with Dr. House.\par

## 2023-04-13 NOTE — REVIEW OF SYSTEMS
[Postnasal Drip] : postnasal drip [Lower Ext Edema] : lower extremity edema [Orthopnea] : orthopnea [Fever] : no fever [Chills] : no chills [Fatigue] : no fatigue [Discharge] : no discharge [Pain] : no pain [Redness] : no redness [Vision Problems] : no vision problems [Itching] : no itching [Earache] : no earache [Hearing Loss] : no hearing loss [Nosebleed] : no nosebleeds [Hoarseness] : no hoarseness [Nasal Discharge] : no nasal discharge [Sore Throat] : no sore throat [Chest Pain] : no chest pain [Palpitations] : no palpitations [Leg Claudication] : no leg claudication [Paroxysmal Nocturnal Dyspnea] : no paroxysmal nocturnal dyspnea [Shortness Of Breath] : no shortness of breath [Wheezing] : no wheezing [Cough] : no cough [Dyspnea on Exertion] : no dyspnea on exertion [Abdominal Pain] : no abdominal pain [Constipation] : no constipation [Diarrhea] : diarrhea [Vomiting] : no vomiting [Dysuria] : no dysuria [Hematuria] : no hematuria [Joint Pain] : no joint pain [Muscle Pain] : no muscle pain [Itching] : no itching [Skin Rash] : no skin rash [Headache] : no headache [Dizziness] : no dizziness [Fainting] : no fainting [Anxiety] : no anxiety [Easy Bruising] : no easy bruising [Swollen Glands] : no swollen glands [FreeTextEntry5] : trace edema

## 2023-04-13 NOTE — HISTORY OF PRESENT ILLNESS
[Post-hospitalization from ___ Hospital] : Post-hospitalization from [unfilled] Hospital [Admitted on: ___] : The patient was admitted on [unfilled] [Discharged on ___] : discharged on [unfilled] [Discharge Summary] : discharge summary [Pertinent Labs] : pertinent labs [Radiology Findings] : radiology findings [Discharge Med List] : discharge medication list [Med Reconciliation] : medication reconciliation has been completed [Patient Contacted By: ____] : and contacted by [unfilled] [FreeTextEntry2] : HPI and Hospital Course: 81 y/o female w/ PMHx of Anxiety, knee osteoarthritis, Hypertension, Hypothyroidism, Morbid obesity, Panic attacks, Diabetes mellitus II, Urine incontinence recently started on Lasix (Cardio) presents to [place]  [facility] with increasing shortness of breath. Admitted to [facility]  for Acute hypoxemic respiratory failure 2/2  Congestive Heart failure. Improved w iv diuresis. BB stopped for bradycardia. \par \par CXR 3/27/23: mild pulmonary venous congestion\par CTA PE protocol 3/28/23: No evidence of pulmonary embolism. Patchy mosaic attenuation of the lung fields, likely related to sequela of pulmonary edema or underlying small airway disease.Underlying early infectious process not excluded.\par  \par Echo 3/29/23: Moderate to severe mitral annular calcification is present. The mitral valve leaflets appear thickened. The posterior leaflet appears at least partially flail. The mean transmitral gradient is 4 mmHg. At lest moderate,highly eccentric mitral valve insufficiency is noted. The aortic valve appears mildly calcified. Valve opening seems to be normal. The tricuspid valve leaflets are thin and pliable; valve motion is normal. Trace to mild tricuspid valve regurgitation is present. The left atrium is at the upper limits of normal in size. Mild concentric left ventricular hypertrophy is present. The left ventricle is normal in size, wall motion, and contractility. Estimated left ventricular ejection fraction is 60-65 %. Normal appearing right atrium. Normal appearing right ventricle structure and function.\par \par Med Reconciliation:\par Medication Reconciliation Status	Admission Reconciliation is Completed\par Discharge Reconciliation is Completed\par Discharge Medications	aspirin 325 mg oral tablet: 1 tab(s) orally once a day\par Crestor 40 mg oral tablet: 1 tab(s) orally once a day\par escitalopram 20 mg oral tablet: 1 tab(s) orally once a day\par furosemide 40 mg oral tablet: 1 tab(s) orally once a day\par Januvia 100 mg oral tablet: 1 tab(s) orally once a day\par levothyroxine 150 mcg (0.15 mg) oral capsule: every other day (3 times a week)\par levothyroxine 175 mcg (0.175 mg) oral tablet: every other day (4 times a week)\par lisinopril 40 mg oral tablet: 1 tab(s) orally once a day\par nateglinide 120 mg oral tablet: 1 tab(s) orally 3 times a day (before meals)\par Toviaz 4 mg oral tablet, extended release: 1 tab(s) orally once a day\par \par Pt reports feeling well, she has lost 7 pounds since hospital admission, she is weighing herself daily and eating a low salt diet. She has a f/u with Dr. Peñaloza 4/14/23 with f/u Echo. She reports feeling much improved with the supplemental oxygen. SpO2 88-94% at home. \par

## 2023-04-13 NOTE — PHYSICAL EXAM
[No Acute Distress] : no acute distress [Well-Appearing] : well-appearing [Normal Sclera/Conjunctiva] : normal sclera/conjunctiva [Normal Outer Ear/Nose] : the outer ears and nose were normal in appearance [No JVD] : no jugular venous distention [No Lymphadenopathy] : no lymphadenopathy [Supple] : supple [No Respiratory Distress] : no respiratory distress  [No Accessory Muscle Use] : no accessory muscle use [Clear to Auscultation] : lungs were clear to auscultation bilaterally [Normal Rate] : normal rate  [Regular Rhythm] : with a regular rhythm [No Extremity Clubbing/Cyanosis] : no extremity clubbing/cyanosis [Soft] : abdomen soft [Normal Anterior Cervical Nodes] : no anterior cervical lymphadenopathy [No Joint Swelling] : no joint swelling [No Rash] : no rash [No Focal Deficits] : no focal deficits [Normal Gait] : normal gait [Normal Affect] : the affect was normal [Alert and Oriented x3] : oriented to person, place, and time [Normal Insight/Judgement] : insight and judgment were intact [de-identified] : bilateral le +1  edema  [de-identified] : obese [de-identified] : cellulitis le

## 2023-04-14 ENCOUNTER — NON-APPOINTMENT (OUTPATIENT)
Age: 83
End: 2023-04-14

## 2023-04-14 ENCOUNTER — APPOINTMENT (OUTPATIENT)
Dept: CARDIOLOGY | Facility: CLINIC | Age: 83
End: 2023-04-14
Payer: MEDICARE

## 2023-04-14 ENCOUNTER — TRANSCRIPTION ENCOUNTER (OUTPATIENT)
Age: 83
End: 2023-04-14

## 2023-04-14 VITALS — DIASTOLIC BLOOD PRESSURE: 70 MMHG | SYSTOLIC BLOOD PRESSURE: 132 MMHG

## 2023-04-14 VITALS
HEIGHT: 62 IN | WEIGHT: 259 LBS | DIASTOLIC BLOOD PRESSURE: 80 MMHG | BODY MASS INDEX: 47.66 KG/M2 | SYSTOLIC BLOOD PRESSURE: 148 MMHG | HEART RATE: 82 BPM | OXYGEN SATURATION: 90 %

## 2023-04-14 PROCEDURE — 93306 TTE W/DOPPLER COMPLETE: CPT

## 2023-04-14 PROCEDURE — 99214 OFFICE O/P EST MOD 30 MIN: CPT

## 2023-04-14 PROCEDURE — 93000 ELECTROCARDIOGRAM COMPLETE: CPT

## 2023-04-14 RX ORDER — ASPIRIN 325 MG/1
325 TABLET, FILM COATED ORAL DAILY
Refills: 0 | Status: ACTIVE | COMMUNITY

## 2023-04-14 RX ORDER — NEBIVOLOL 5 MG/1
5 TABLET ORAL DAILY
Qty: 90 | Refills: 1 | Status: DISCONTINUED | COMMUNITY
Start: 2022-07-19 | End: 2023-04-14

## 2023-04-14 NOTE — PHYSICAL EXAM
[No Acute Distress] : no acute distress [Obese] : obese [Normal S1, S2] : normal S1, S2 [Clear Lung Fields] : clear lung fields [No Respiratory Distress] : no respiratory distress  [Normal Bowel Sounds] : normal bowel sounds [No Rash] : no rash [Alert and Oriented] : alert and oriented [de-identified] : Wearing a face mask [de-identified] : systolic murmur [de-identified] : No crackles/wheeze [de-identified] : Mild pedal edema

## 2023-04-14 NOTE — REVIEW OF SYSTEMS
[Weight Loss (___ Lbs)] : [unfilled] ~Ulb weight loss [SOB] : no shortness of breath [Chest Discomfort] : no chest discomfort [Palpitations] : no palpitations [Cough] : no cough

## 2023-04-14 NOTE — HISTORY OF PRESENT ILLNESS
[FreeTextEntry1] : Julieta Lara is an 82-year-old woman with a history of hypertension, hypercholesterolemia, diabetes mellitus, obesity, pulmonary hypertension, diastolic heart failure, mitral and tricuspid valve regurgitation, anxiety, and September 2021 hospitalization for pulmonary edema and bilateral pleural effusions who returns for cardiac examination.  She was recently hospitalized with an exacerbation of chronic diastolic heart failure.  During her hospitalization she was diuresed with IV Lasix, lisinopril dose was uptitrated, and nebivolol was discontinued due to bradycardia.\par \par She tells me that she feels "great."  She describes marked improvement in dyspnea and has no leg edema.  She says "I got the spring back in my step."\par

## 2023-04-14 NOTE — DISCUSSION/SUMMARY
[With Me] : with me [___ Week(s)] : in [unfilled] week(s) [FreeTextEntry1] : \par CHF: Recent exacerbation–now improved; continue furosemide 40 mg daily;  no beta-blockade given marked bradycardia during hospitalization; can consider transitioning lisinopril to Entresto at future visit if not coat prohibitive.\par \par Hypertension:    Improved; continue lisinopril 40 mg daily.\par \par Mitral regurgitation: Chronic -- await TTE to re-evaluate severity and guide management.

## 2023-04-14 NOTE — CARDIOLOGY SUMMARY
[de-identified] : 4/14/23.  Sinus  Rhythm.  Left axis -anterior fascicular block.  Nonspecific IVCD.   Poor precordial R wave progression.   [de-identified] : 3/29/2023.  Normal left ventricular size and function with estimated ejection fraction 60 to 65%.  Mild to con centric LVH.  Normal right ventricular size and function.  The left atrium is at the upper limits of normal in size.  The posterior mitral leaflet appears partially flail with at least moderate eccentric mitral regurgitation and mean transmitral gradient of 4 mmHg (mild mitral stenosis in the setting of severe mitral annular calcification and thickened mitral leaflets).  Trace to mild tricuspid regurgitation. [de-identified] : 5/31/18.  Normal coronary arteries. Moderate pulmonary hypertension. Elevated pulmonary capillary wedge pressure.

## 2023-04-21 ENCOUNTER — TRANSCRIPTION ENCOUNTER (OUTPATIENT)
Age: 83
End: 2023-04-21

## 2023-04-28 ENCOUNTER — TRANSCRIPTION ENCOUNTER (OUTPATIENT)
Age: 83
End: 2023-04-28

## 2023-05-04 ENCOUNTER — TRANSCRIPTION ENCOUNTER (OUTPATIENT)
Age: 83
End: 2023-05-04

## 2023-05-18 ENCOUNTER — RX RENEWAL (OUTPATIENT)
Age: 83
End: 2023-05-18

## 2023-06-02 ENCOUNTER — APPOINTMENT (OUTPATIENT)
Dept: CARDIOLOGY | Facility: CLINIC | Age: 83
End: 2023-06-02
Payer: MEDICARE

## 2023-06-02 VITALS
OXYGEN SATURATION: 92 % | DIASTOLIC BLOOD PRESSURE: 72 MMHG | BODY MASS INDEX: 47.84 KG/M2 | WEIGHT: 260 LBS | HEART RATE: 60 BPM | HEIGHT: 62 IN | SYSTOLIC BLOOD PRESSURE: 144 MMHG

## 2023-06-02 VITALS — DIASTOLIC BLOOD PRESSURE: 78 MMHG | SYSTOLIC BLOOD PRESSURE: 140 MMHG

## 2023-06-02 VITALS
RESPIRATION RATE: 15 BRPM | HEIGHT: 62 IN | OXYGEN SATURATION: 92 % | HEART RATE: 60 BPM | BODY MASS INDEX: 47.84 KG/M2 | WEIGHT: 260 LBS

## 2023-06-02 PROCEDURE — 99214 OFFICE O/P EST MOD 30 MIN: CPT

## 2023-06-02 RX ORDER — HYDROCHLOROTHIAZIDE 25 MG/1
25 TABLET ORAL DAILY
Qty: 90 | Refills: 0 | Status: COMPLETED | COMMUNITY
Start: 2022-01-19 | End: 2023-06-02

## 2023-06-02 NOTE — PHYSICAL EXAM
[Obese] : obese [Normal S1, S2] : normal S1, S2 [Clear Lung Fields] : clear lung fields [No Respiratory Distress] : no respiratory distress  [Normal Bowel Sounds] : normal bowel sounds [Alert and Oriented] : alert and oriented [No Edema] : no edema [de-identified] : systolic murmur [de-identified] : Warm, dry

## 2023-06-02 NOTE — CARDIOLOGY SUMMARY
[de-identified] : 4/14/23.  Sinus  Rhythm.  Left axis -anterior fascicular block.  Nonspecific IVCD.   Poor precordial R wave progression.   [de-identified] : 3/29/2023.  Normal left ventricular size and function with estimated ejection fraction 60 to 65%.  Mild to con centric LVH.  Normal right ventricular size and function.  The left atrium is at the upper limits of normal in size.  The posterior mitral leaflet appears partially flail with at least moderate eccentric mitral regurgitation and mean transmitral gradient of 4 mmHg (mild mitral stenosis in the setting of severe mitral annular calcification and thickened mitral leaflets).  Trace to mild tricuspid regurgitation. [de-identified] : 5/31/18.  Normal coronary arteries. Moderate pulmonary hypertension. Elevated pulmonary capillary wedge pressure.

## 2023-06-02 NOTE — REVIEW OF SYSTEMS
[Weight Loss (___ Lbs)] : [unfilled] ~Ulb weight loss [SOB] : no shortness of breath [Chest Discomfort] : no chest discomfort [Lower Ext Edema] : no extremity edema [Palpitations] : no palpitations [Cough] : no cough

## 2023-06-02 NOTE — HISTORY OF PRESENT ILLNESS
[FreeTextEntry1] : Julieta Lara is an 82-year-old woman with a history of hypertension, hypercholesterolemia, diabetes mellitus, obesity, pulmonary hypertension, HFpEF, mitral and tricuspid valve regurgitation, anxiety, and hospitalizations for exacerbations of heart failure (September 2021; March/April 2023) who returns for cardiac examination --  during our last encounter on April 14th, she felt well.  Mrs Lara continues to feel well.  She describes diligent low sodium consumption.  She changed her Lasix dosing to qod because of frequent urination ("I can't leave my house").  She denies dyspnea; some weight loss on home scale.

## 2023-06-02 NOTE — DISCUSSION/SUMMARY
[With Me] : with me [___ Month(s)] : in [unfilled] month(s) [FreeTextEntry1] : \par HFpEF:  Resolved exacerbation and presently stable; We discussed advantage of Entresto (compared to current use of lisinopril) and she will investigate cost but already describes high co-pays from several of her diabetic medications and may not be able to transition;  continue furosemide–I again discussed with her the importance of daily weights and suggested that she increase the dosing frequency of Lasix in the event of weight gain, edema, or worsening dyspnea.  No beta-blocker due to bradycardia during hospitalization.\par \par Hypertension:    Fair control; continue Amlodipine and lisinopril; weight loss.\par \par Mitral regurgitation: Chronic and mild to moderate in severity; observation; continue present medical therapy.

## 2023-06-22 ENCOUNTER — APPOINTMENT (OUTPATIENT)
Dept: INTERNAL MEDICINE | Facility: CLINIC | Age: 83
End: 2023-06-22
Payer: MEDICARE

## 2023-06-22 VITALS
SYSTOLIC BLOOD PRESSURE: 152 MMHG | WEIGHT: 257 LBS | RESPIRATION RATE: 16 BRPM | BODY MASS INDEX: 47.29 KG/M2 | TEMPERATURE: 97.2 F | OXYGEN SATURATION: 85 % | HEART RATE: 60 BPM | HEIGHT: 62 IN | DIASTOLIC BLOOD PRESSURE: 70 MMHG

## 2023-06-22 DIAGNOSIS — E66.01 MORBID (SEVERE) OBESITY DUE TO EXCESS CALORIES: ICD-10-CM

## 2023-06-22 DIAGNOSIS — I27.20 PULMONARY HYPERTENSION, UNSPECIFIED: ICD-10-CM

## 2023-06-22 DIAGNOSIS — G47.34 IDIOPATHIC SLEEP RELATED NONOBSTRUCTIVE ALVEOLAR HYPOVENTILATION: ICD-10-CM

## 2023-06-22 DIAGNOSIS — Z87.891 PERSONAL HISTORY OF NICOTINE DEPENDENCE: ICD-10-CM

## 2023-06-22 DIAGNOSIS — R06.09 OTHER FORMS OF DYSPNEA: ICD-10-CM

## 2023-06-22 PROCEDURE — 99214 OFFICE O/P EST MOD 30 MIN: CPT

## 2023-06-22 NOTE — PLAN
[FreeTextEntry1] : Mrs. Lara presents for a follow-up evaluation.\par \par 1.  She will continue on current medication regimen which has been reviewed and revised.\par \par 2.  Patient continues to demonstrate hypoxemia.  O2 saturation on room air at rest was 88%.  She will continue to use supplemental oxygen at night and as needed.  Mrs. Lara states that her O2 saturation is usually 90 to 92% at rest.  She did have a polysomnography examination in 10/21 which was negative for obstructive sleep apnea.  She will repeat home polysomnography examination.\par \par 3.  Echocardiogram results noted.\par \par 4.  Patient has been given prescription for CBC, CMP, hemoglobin A1c, iron levels, lipid profile, TSH level and urinalysis.\par \par 5.  Follow-up with endocrinology for management of diabetes.  Patient has been to the ophthalmologist.\par \par 6.  Follow-up in this office in 6 months with complete pulmonary function test.

## 2023-06-22 NOTE — HISTORY OF PRESENT ILLNESS
[FreeTextEntry1] : Follow-up [de-identified] : Ms. Lara presents for follow-up evaluation.  She is complaining of some shortness of breath with exertion.  Activities such as going up stairs, going uphill or carrying groceries causes shortness of breath.  She does have a history of nocturnal hypoxemia.  She has supplemental oxygen which she wears at night while asleep and as needed during the day. She did have a home polysomnography examination on 10/13/2021 which did not show significant obstructive sleep apnea.  The AHI was 3.6 events per hour.  She had an echocardiogram on 4/14/2023 which has showed LVEF of 56% with concentric left ventricular hypertrophy.  She had some diastolic dysfunction.  There was no evidence of pulmonary hypertension.

## 2023-06-28 ENCOUNTER — RX RENEWAL (OUTPATIENT)
Age: 83
End: 2023-06-28

## 2023-07-06 NOTE — ED ADULT NURSE NOTE - WILL THE PATIENT ACCEPT THE PFIZER COVID-19 VACCINE IF ELIGIBLE AND IT IS AVAILABLE?
No

## 2023-07-10 LAB
ALBUMIN SERPL ELPH-MCNC: 3.9 G/DL
ALP BLD-CCNC: 51 U/L
ALT SERPL-CCNC: 12 U/L
ANION GAP SERPL CALC-SCNC: 13 MMOL/L
APPEARANCE: ABNORMAL
AST SERPL-CCNC: 19 U/L
BACTERIA: ABNORMAL /HPF
BILIRUB SERPL-MCNC: 0.5 MG/DL
BILIRUBIN URINE: NEGATIVE
BLOOD URINE: ABNORMAL
BUN SERPL-MCNC: 13 MG/DL
CALCIUM OXALATE CRYSTALS: PRESENT
CALCIUM SERPL-MCNC: 9.9 MG/DL
CAST: 0 /LPF
CHLORIDE SERPL-SCNC: 106 MMOL/L
CHOLEST SERPL-MCNC: 151 MG/DL
CO2 SERPL-SCNC: 26 MMOL/L
COLOR: YELLOW
CREAT SERPL-MCNC: 0.65 MG/DL
EGFR: 87 ML/MIN/1.73M2
EPITHELIAL CELLS: 8 /HPF
ESTIMATED AVERAGE GLUCOSE: 148 MG/DL
GLUCOSE QUALITATIVE U: >=1000 MG/DL
GLUCOSE SERPL-MCNC: 151 MG/DL
HBA1C MFR BLD HPLC: 6.8 %
HDLC SERPL-MCNC: 54 MG/DL
IRON SATN MFR SERPL: 24 %
IRON SERPL-MCNC: 77 UG/DL
KETONES URINE: NEGATIVE MG/DL
LDLC SERPL CALC-MCNC: 77 MG/DL
LEUKOCYTE ESTERASE URINE: ABNORMAL
MICROSCOPIC-UA: NORMAL
NITRITE URINE: POSITIVE
NONHDLC SERPL-MCNC: 97 MG/DL
PH URINE: 6.5
POTASSIUM SERPL-SCNC: 4.5 MMOL/L
PROT SERPL-MCNC: 6.8 G/DL
PROTEIN URINE: NORMAL MG/DL
RED BLOOD CELLS URINE: 19 /HPF
REVIEW: NORMAL
SODIUM SERPL-SCNC: 145 MMOL/L
SPECIFIC GRAVITY URINE: >1.03
TIBC SERPL-MCNC: 323 UG/DL
TRIGL SERPL-MCNC: 99 MG/DL
TSH SERPL-ACNC: 5.66 UIU/ML
UIBC SERPL-MCNC: 246 UG/DL
UROBILINOGEN URINE: 1 MG/DL
WHITE BLOOD CELLS URINE: 22 /HPF

## 2023-08-03 ENCOUNTER — RX RENEWAL (OUTPATIENT)
Age: 83
End: 2023-08-03

## 2023-08-24 ENCOUNTER — OUTPATIENT (OUTPATIENT)
Dept: OUTPATIENT SERVICES | Facility: HOSPITAL | Age: 83
LOS: 1 days | End: 2023-08-24
Payer: MEDICARE

## 2023-08-24 DIAGNOSIS — Z90.89 ACQUIRED ABSENCE OF OTHER ORGANS: Chronic | ICD-10-CM

## 2023-08-24 DIAGNOSIS — Z41.9 ENCOUNTER FOR PROCEDURE FOR PURPOSES OTHER THAN REMEDYING HEALTH STATE, UNSPECIFIED: Chronic | ICD-10-CM

## 2023-08-24 DIAGNOSIS — Z98.890 OTHER SPECIFIED POSTPROCEDURAL STATES: Chronic | ICD-10-CM

## 2023-08-24 DIAGNOSIS — Z98.49 CATARACT EXTRACTION STATUS, UNSPECIFIED EYE: Chronic | ICD-10-CM

## 2023-08-24 DIAGNOSIS — G47.33 OBSTRUCTIVE SLEEP APNEA (ADULT) (PEDIATRIC): ICD-10-CM

## 2023-08-24 PROCEDURE — 95800 SLP STDY UNATTENDED: CPT

## 2023-08-24 PROCEDURE — G0399: CPT | Mod: 26

## 2023-09-06 ENCOUNTER — APPOINTMENT (OUTPATIENT)
Dept: CARDIOLOGY | Facility: CLINIC | Age: 83
End: 2023-09-06
Payer: MEDICARE

## 2023-09-06 ENCOUNTER — NON-APPOINTMENT (OUTPATIENT)
Age: 83
End: 2023-09-06

## 2023-09-06 VITALS
SYSTOLIC BLOOD PRESSURE: 142 MMHG | OXYGEN SATURATION: 90 % | DIASTOLIC BLOOD PRESSURE: 70 MMHG | WEIGHT: 264 LBS | HEART RATE: 62 BPM | BODY MASS INDEX: 48.29 KG/M2

## 2023-09-06 VITALS — SYSTOLIC BLOOD PRESSURE: 136 MMHG | DIASTOLIC BLOOD PRESSURE: 76 MMHG

## 2023-09-06 DIAGNOSIS — R06.02 SHORTNESS OF BREATH: ICD-10-CM

## 2023-09-06 PROCEDURE — 99214 OFFICE O/P EST MOD 30 MIN: CPT

## 2023-09-06 PROCEDURE — 93000 ELECTROCARDIOGRAM COMPLETE: CPT

## 2023-09-06 RX ORDER — HYDROCHLOROTHIAZIDE 25 MG/1
25 TABLET ORAL DAILY
Refills: 0 | Status: COMPLETED | COMMUNITY
End: 2023-09-06

## 2023-09-06 NOTE — PHYSICAL EXAM
[Obese] : obese [Normal S1, S2] : normal S1, S2 [Clear Lung Fields] : clear lung fields [No Respiratory Distress] : no respiratory distress  [Normal Bowel Sounds] : normal bowel sounds [Alert and Oriented] : alert and oriented [No Acute Distress] : no acute distress [Good Air Entry] : good air entry [Normal Speech] : normal speech [de-identified] : systolic murmur

## 2023-09-06 NOTE — REVIEW OF SYSTEMS
[Weight Gain (___ Lbs)] : [unfilled] ~Ulb weight gain [SOB] : shortness of breath [Cough] : cough [Chest Discomfort] : no chest discomfort [Lower Ext Edema] : no extremity edema [Palpitations] : no palpitations [FreeTextEntry4] : Post-nasal drip

## 2023-09-06 NOTE — HISTORY OF PRESENT ILLNESS
[FreeTextEntry1] : Julieta Lara is an 82-year-old woman with a history of hypertension, hypercholesterolemia, diabetes mellitus, obesity, pulmonary hypertension, HFpEF, mitral and tricuspid valve regurgitation, anxiety, and hospitalizations for exacerbations of heart failure (September 2021; March/April 2023) who returns for cardiac examination.  She describes a mild increase in dyspnea associated with a postnasal drip and occasional "coughing spells." She uses supplemental oxygen for several hours while awake in the afternoons and during sleep.  Weight has increased since her last visit. She denies edema.

## 2023-09-06 NOTE — CARDIOLOGY SUMMARY
[de-identified] : 9/6/23.  Sinus Rhythm.  Left axis -anterior fascicular block. Voltage criteria for LVH. Old septal infarct. [de-identified] : 3/29/2023.  Normal left ventricular size and function with estimated ejection fraction 60 to 65%.  Mild to con centric LVH.  Normal right ventricular size and function.  The left atrium is at the upper limits of normal in size.  The posterior mitral leaflet appears partially flail with at least moderate eccentric mitral regurgitation and mean transmitral gradient of 4 mmHg (mild mitral stenosis in the setting of severe mitral annular calcification and thickened mitral leaflets).  Trace to mild tricuspid regurgitation. [de-identified] : 5/31/18.  Normal coronary arteries. Moderate pulmonary hypertension. Elevated pulmonary capillary wedge pressure.

## 2023-09-06 NOTE — DISCUSSION/SUMMARY
[___ Month(s)] : in [unfilled] month(s) [With ___] : with [unfilled] [FreeTextEntry1] :  Shortness of breath:  Mild worsening -- clear lungs, no significant edema but weight has increased; I suggested a trial of increased diuretic use (Lasix 80 mg daily x 4 days followed by return to usual 40mg daily dose); I asked her to call me if symptoms do not improve.  HFpEF:  ? mild exacerbation; Lasix increased; we previously discussed use of Entresto but she was hesitant because of brand-drug co-pay; continue Lisinopril, Jardiance, and Bystolic (previously stopped due to bradycardia but subsequently resumed and tolerating).  Hypertension:   Fair control; continue Amlodipine and lisinopril; weight loss.  Mitral regurgitation: Chronic; mild to moderate in severity.

## 2023-09-08 ENCOUNTER — APPOINTMENT (OUTPATIENT)
Dept: UROLOGY | Facility: CLINIC | Age: 83
End: 2023-09-08

## 2023-09-12 ENCOUNTER — APPOINTMENT (OUTPATIENT)
Dept: UROLOGY | Facility: CLINIC | Age: 83
End: 2023-09-12
Payer: MEDICARE

## 2023-09-12 VITALS
DIASTOLIC BLOOD PRESSURE: 73 MMHG | SYSTOLIC BLOOD PRESSURE: 151 MMHG | HEIGHT: 62 IN | TEMPERATURE: 97.5 F | BODY MASS INDEX: 46.38 KG/M2 | HEART RATE: 78 BPM | WEIGHT: 252 LBS | RESPIRATION RATE: 15 BRPM | OXYGEN SATURATION: 96 %

## 2023-09-12 PROCEDURE — 51702 INSERT TEMP BLADDER CATH: CPT

## 2023-09-12 PROCEDURE — 99213 OFFICE O/P EST LOW 20 MIN: CPT | Mod: 25

## 2023-09-17 LAB — BACTERIA UR CULT: ABNORMAL

## 2023-10-03 ENCOUNTER — APPOINTMENT (OUTPATIENT)
Dept: UROLOGY | Facility: CLINIC | Age: 83
End: 2023-10-03
Payer: MEDICARE

## 2023-10-03 DIAGNOSIS — T83.511D INFECTION AND INFLAMMATORY REACTION DUE TO INDWELLING URETHRAL CATHETER, SUBSEQUENT ENCOUNTER: ICD-10-CM

## 2023-10-03 DIAGNOSIS — N39.0 INFECTION AND INFLAMMATORY REACTION DUE TO INDWELLING URETHRAL CATHETER, SUBSEQUENT ENCOUNTER: ICD-10-CM

## 2023-10-03 PROCEDURE — 51702 INSERT TEMP BLADDER CATH: CPT

## 2023-10-22 ENCOUNTER — NON-APPOINTMENT (OUTPATIENT)
Age: 83
End: 2023-10-22

## 2023-10-25 ENCOUNTER — APPOINTMENT (OUTPATIENT)
Dept: UROLOGY | Facility: CLINIC | Age: 83
End: 2023-10-25
Payer: MEDICARE

## 2023-10-25 PROCEDURE — 51702 INSERT TEMP BLADDER CATH: CPT

## 2023-10-30 ENCOUNTER — NON-APPOINTMENT (OUTPATIENT)
Age: 83
End: 2023-10-30

## 2023-10-31 ENCOUNTER — APPOINTMENT (OUTPATIENT)
Dept: UROLOGY | Facility: CLINIC | Age: 83
End: 2023-10-31

## 2023-11-02 ENCOUNTER — RX RENEWAL (OUTPATIENT)
Age: 83
End: 2023-11-02

## 2023-11-02 RX ORDER — EMPAGLIFLOZIN 10 MG/1
10 TABLET, FILM COATED ORAL DAILY
Qty: 90 | Refills: 1 | Status: ACTIVE | COMMUNITY
Start: 2022-10-10 | End: 1900-01-01

## 2023-11-22 ENCOUNTER — APPOINTMENT (OUTPATIENT)
Dept: INTERNAL MEDICINE | Facility: CLINIC | Age: 83
End: 2023-11-22
Payer: MEDICARE

## 2023-11-22 VITALS
OXYGEN SATURATION: 93 % | TEMPERATURE: 98.5 F | SYSTOLIC BLOOD PRESSURE: 120 MMHG | HEART RATE: 78 BPM | WEIGHT: 259 LBS | RESPIRATION RATE: 16 BRPM | HEIGHT: 62 IN | DIASTOLIC BLOOD PRESSURE: 70 MMHG | BODY MASS INDEX: 47.66 KG/M2

## 2023-11-22 PROCEDURE — 99213 OFFICE O/P EST LOW 20 MIN: CPT | Mod: 25

## 2023-11-22 PROCEDURE — 36415 COLL VENOUS BLD VENIPUNCTURE: CPT

## 2023-11-22 RX ORDER — FUROSEMIDE 40 MG/1
40 TABLET ORAL
Qty: 90 | Refills: 1 | Status: ACTIVE | COMMUNITY
Start: 2023-03-07

## 2023-11-24 ENCOUNTER — APPOINTMENT (OUTPATIENT)
Dept: UROLOGY | Facility: CLINIC | Age: 83
End: 2023-11-24

## 2023-11-27 ENCOUNTER — APPOINTMENT (OUTPATIENT)
Dept: UROLOGY | Facility: CLINIC | Age: 83
End: 2023-11-27
Payer: MEDICARE

## 2023-11-27 PROCEDURE — 51703 INSERT BLADDER CATH COMPLEX: CPT

## 2023-11-29 ENCOUNTER — NON-APPOINTMENT (OUTPATIENT)
Age: 83
End: 2023-11-29

## 2023-11-29 LAB
T3FREE SERPL-MCNC: 2.02 PG/ML
T4 FREE SERPL-MCNC: 1.1 NG/DL
TSH SERPL-ACNC: 5.26 UIU/ML

## 2023-11-29 RX ORDER — LEVOTHYROXINE SODIUM 0.14 MG/1
137 TABLET ORAL DAILY
Refills: 0 | Status: DISCONTINUED | COMMUNITY
End: 2023-11-29

## 2023-11-29 RX ORDER — LEVOTHYROXINE SODIUM 0.15 MG/1
150 TABLET ORAL
Qty: 90 | Refills: 1 | Status: ACTIVE | COMMUNITY
Start: 2023-11-29 | End: 1900-01-01

## 2023-12-04 ENCOUNTER — APPOINTMENT (OUTPATIENT)
Dept: UROLOGY | Facility: CLINIC | Age: 83
End: 2023-12-04
Payer: MEDICARE

## 2023-12-04 PROCEDURE — 51703 INSERT BLADDER CATH COMPLEX: CPT

## 2023-12-06 ENCOUNTER — APPOINTMENT (OUTPATIENT)
Dept: CARDIOLOGY | Facility: CLINIC | Age: 83
End: 2023-12-06
Payer: MEDICARE

## 2023-12-06 ENCOUNTER — NON-APPOINTMENT (OUTPATIENT)
Age: 83
End: 2023-12-06

## 2023-12-06 VITALS
WEIGHT: 260 LBS | DIASTOLIC BLOOD PRESSURE: 66 MMHG | OXYGEN SATURATION: 95 % | HEIGHT: 62 IN | HEART RATE: 78 BPM | SYSTOLIC BLOOD PRESSURE: 130 MMHG | BODY MASS INDEX: 47.84 KG/M2

## 2023-12-06 VITALS
HEIGHT: 62 IN | SYSTOLIC BLOOD PRESSURE: 130 MMHG | HEART RATE: 78 BPM | BODY MASS INDEX: 47.84 KG/M2 | DIASTOLIC BLOOD PRESSURE: 66 MMHG | OXYGEN SATURATION: 95 % | RESPIRATION RATE: 15 BRPM | WEIGHT: 260 LBS

## 2023-12-06 PROCEDURE — 99214 OFFICE O/P EST MOD 30 MIN: CPT

## 2023-12-06 PROCEDURE — 93000 ELECTROCARDIOGRAM COMPLETE: CPT

## 2023-12-06 RX ORDER — NEBIVOLOL 5 MG/1
5 TABLET ORAL DAILY
Qty: 90 | Refills: 1 | Status: ACTIVE | COMMUNITY
Start: 1900-01-01 | End: 1900-01-01

## 2023-12-06 RX ORDER — CEFUROXIME AXETIL 500 MG/1
500 TABLET ORAL
Qty: 14 | Refills: 0 | Status: COMPLETED | COMMUNITY
Start: 2023-10-03 | End: 2023-12-06

## 2023-12-27 ENCOUNTER — APPOINTMENT (OUTPATIENT)
Dept: UROLOGY | Facility: CLINIC | Age: 83
End: 2023-12-27
Payer: MEDICARE

## 2023-12-27 DIAGNOSIS — N34.3 URETHRAL SYNDROME, UNSPECIFIED: ICD-10-CM

## 2023-12-27 PROCEDURE — 51702 INSERT TEMP BLADDER CATH: CPT

## 2023-12-31 ENCOUNTER — RX RENEWAL (OUTPATIENT)
Age: 83
End: 2023-12-31

## 2024-01-02 ENCOUNTER — RX RENEWAL (OUTPATIENT)
Age: 84
End: 2024-01-02

## 2024-01-23 ENCOUNTER — RX RENEWAL (OUTPATIENT)
Age: 84
End: 2024-01-23

## 2024-01-23 RX ORDER — SITAGLIPTIN 100 MG/1
100 TABLET, FILM COATED ORAL DAILY
Qty: 90 | Refills: 1 | Status: ACTIVE | COMMUNITY
Start: 2022-03-16 | End: 1900-01-01

## 2024-01-24 ENCOUNTER — RX RENEWAL (OUTPATIENT)
Age: 84
End: 2024-01-24

## 2024-01-24 RX ORDER — AMLODIPINE BESYLATE 5 MG/1
5 TABLET ORAL DAILY
Qty: 90 | Refills: 3 | Status: ACTIVE | COMMUNITY
Start: 2022-04-20 | End: 1900-01-01

## 2024-01-26 ENCOUNTER — APPOINTMENT (OUTPATIENT)
Dept: UROLOGY | Facility: CLINIC | Age: 84
End: 2024-01-26

## 2024-01-26 ENCOUNTER — APPOINTMENT (OUTPATIENT)
Dept: UROLOGY | Facility: CLINIC | Age: 84
End: 2024-01-26
Payer: MEDICARE

## 2024-01-26 DIAGNOSIS — R35.1 NOCTURIA: ICD-10-CM

## 2024-01-26 PROCEDURE — 51702 INSERT TEMP BLADDER CATH: CPT

## 2024-02-20 ENCOUNTER — APPOINTMENT (OUTPATIENT)
Dept: UROLOGY | Facility: CLINIC | Age: 84
End: 2024-02-20

## 2024-03-06 ENCOUNTER — APPOINTMENT (OUTPATIENT)
Dept: CARDIOLOGY | Facility: CLINIC | Age: 84
End: 2024-03-06
Payer: MEDICARE

## 2024-03-06 VITALS
WEIGHT: 263 LBS | OXYGEN SATURATION: 90 % | HEIGHT: 62 IN | DIASTOLIC BLOOD PRESSURE: 64 MMHG | BODY MASS INDEX: 48.4 KG/M2 | HEART RATE: 67 BPM | SYSTOLIC BLOOD PRESSURE: 130 MMHG

## 2024-03-06 VITALS — SYSTOLIC BLOOD PRESSURE: 136 MMHG | DIASTOLIC BLOOD PRESSURE: 68 MMHG

## 2024-03-06 DIAGNOSIS — I34.0 NONRHEUMATIC MITRAL (VALVE) INSUFFICIENCY: ICD-10-CM

## 2024-03-06 DIAGNOSIS — I50.32 CHRONIC DIASTOLIC (CONGESTIVE) HEART FAILURE: ICD-10-CM

## 2024-03-06 PROCEDURE — 99214 OFFICE O/P EST MOD 30 MIN: CPT

## 2024-03-06 PROCEDURE — G2211 COMPLEX E/M VISIT ADD ON: CPT

## 2024-03-06 RX ORDER — SACUBITRIL AND VALSARTAN 97; 103 MG/1; MG/1
97-103 TABLET, FILM COATED ORAL
Qty: 180 | Refills: 2 | Status: ACTIVE | COMMUNITY
Start: 2023-12-06 | End: 1900-01-01

## 2024-03-06 RX ORDER — LISINOPRIL 20 MG/1
20 TABLET ORAL DAILY
Refills: 0 | Status: DISCONTINUED | COMMUNITY
End: 2024-03-06

## 2024-03-06 NOTE — CARDIOLOGY SUMMARY
[de-identified] : 12/6/23.  Sinus Rhythm.   Left axis -anterior fascicular block.  Nonspecific IVCD.  Poor R-wave progression -nonspecific  [de-identified] : 5/31/18.  Normal coronary arteries. Moderate pulmonary hypertension. Elevated pulmonary capillary wedge pressure. [de-identified] : 3/29/2023.  Normal left ventricular size and function with estimated ejection fraction 60 to 65%.  Mild to con centric LVH.  Normal right ventricular size and function.  The left atrium is at the upper limits of normal in size.  The posterior mitral leaflet appears partially flail with at least moderate eccentric mitral regurgitation and mean transmitral gradient of 4 mmHg (mild mitral stenosis in the setting of severe mitral annular calcification and thickened mitral leaflets).  Trace to mild tricuspid regurgitation.

## 2024-03-06 NOTE — REVIEW OF SYSTEMS
[Lower Ext Edema] : lower extremity edema [Weight Loss (___ Lbs)] : no recent weight loss [SOB] : no shortness of breath [Chest Discomfort] : no chest discomfort

## 2024-03-06 NOTE — DISCUSSION/SUMMARY
[With Me] : with me [___ Month(s)] : in [unfilled] month(s) [FreeTextEntry1] :  HFpEF (chronic): Stable; Continue Entresto uptitrate dose to 97-103mg BID; Continue furosemide and Nebivolol; order given for blood work--will check metabolic panel.  Hypertension:   Satisfactory control; continue present doses of amlodipine and lisinopril.  Mitral regurgitation: Chronic; mild to moderate in severity.

## 2024-03-06 NOTE — HISTORY OF PRESENT ILLNESS
[FreeTextEntry1] : Julieta Lara is an 83-year-old woman with a history of hypertension, hypercholesterolemia, diabetes mellitus, obesity, pulmonary hypertension, HFpEF, mitral and tricuspid valve regurgitation, anxiety, and hospitalizations for exacerbations of heart failure (September 2021; March/April 2023) who returns for cardiac examination.  During our last encounter in December 2023, I prescribed Entresto. She says that she has been doing quite well--breathing has been stable; leg edema has improved; no orthopnea; unfortunately, no significant weight loss.  She has no new complaints today and is tolerating prescribed pharmacotherapy.  * This visit was conducted as part of ongoing, longitudinal medical care for patient's chronic medical diagnoses and other issues.

## 2024-03-06 NOTE — PHYSICAL EXAM
[Obese] : obese [Normal S1, S2] : normal S1, S2 [Clear Lung Fields] : clear lung fields [Alert and Oriented] : alert and oriented [de-identified] : systolic murmur [de-identified] : Central obesity [de-identified] : Trace ankle edema [de-identified] : Warm, dry

## 2024-03-08 ENCOUNTER — APPOINTMENT (OUTPATIENT)
Dept: INTERNAL MEDICINE | Facility: CLINIC | Age: 84
End: 2024-03-08
Payer: MEDICARE

## 2024-03-08 VITALS
OXYGEN SATURATION: 95 % | SYSTOLIC BLOOD PRESSURE: 135 MMHG | TEMPERATURE: 98.6 F | DIASTOLIC BLOOD PRESSURE: 68 MMHG | HEIGHT: 62 IN | HEART RATE: 62 BPM | WEIGHT: 263 LBS | BODY MASS INDEX: 48.4 KG/M2 | RESPIRATION RATE: 16 BRPM

## 2024-03-08 DIAGNOSIS — G47.33 OBSTRUCTIVE SLEEP APNEA (ADULT) (PEDIATRIC): ICD-10-CM

## 2024-03-08 DIAGNOSIS — I27.20 PULMONARY HYPERTENSION, UNSPECIFIED: ICD-10-CM

## 2024-03-08 DIAGNOSIS — R82.90 UNSPECIFIED ABNORMAL FINDINGS IN URINE: ICD-10-CM

## 2024-03-08 DIAGNOSIS — E03.9 HYPOTHYROIDISM, UNSPECIFIED: ICD-10-CM

## 2024-03-08 DIAGNOSIS — Z20.822 CONTACT WITH AND (SUSPECTED) EXPOSURE TO COVID-19: ICD-10-CM

## 2024-03-08 DIAGNOSIS — Z01.812 ENCOUNTER FOR PREPROCEDURAL LABORATORY EXAMINATION: ICD-10-CM

## 2024-03-08 DIAGNOSIS — E11.9 TYPE 2 DIABETES MELLITUS W/OUT COMPLICATIONS: ICD-10-CM

## 2024-03-08 DIAGNOSIS — E78.00 PURE HYPERCHOLESTEROLEMIA, UNSPECIFIED: ICD-10-CM

## 2024-03-08 DIAGNOSIS — L03.119 CELLULITIS OF UNSPECIFIED PART OF LIMB: ICD-10-CM

## 2024-03-08 DIAGNOSIS — I50.30 UNSPECIFIED DIASTOLIC (CONGESTIVE) HEART FAILURE: ICD-10-CM

## 2024-03-08 DIAGNOSIS — Z09 ENCOUNTER FOR FOLLOW-UP EXAMINATION AFTER COMPLETED TREATMENT FOR CONDITIONS OTHER THAN MALIGNANT NEOPLASM: ICD-10-CM

## 2024-03-08 DIAGNOSIS — H61.21 IMPACTED CERUMEN, RIGHT EAR: ICD-10-CM

## 2024-03-08 DIAGNOSIS — I10 ESSENTIAL (PRIMARY) HYPERTENSION: ICD-10-CM

## 2024-03-08 DIAGNOSIS — R09.02 HYPOXEMIA: ICD-10-CM

## 2024-03-08 DIAGNOSIS — Z11.52 ENCOUNTER FOR PREPROCEDURAL LABORATORY EXAMINATION: ICD-10-CM

## 2024-03-08 PROCEDURE — 99214 OFFICE O/P EST MOD 30 MIN: CPT

## 2024-03-08 PROCEDURE — ZZZZZ: CPT

## 2024-03-08 NOTE — HISTORY OF PRESENT ILLNESS
[FreeTextEntry1] : Follow-up [de-identified] : Mrs. Lara presents for follow-up evaluation.  She is feeling well.  She denies any chest pain, shortness of breath or palpitations.  There are no nocturnal symptoms of cough or dyspnea.  She is still having episodes of urine incontinence and will be following up with Dr. Stevens.  She did have a cardiology follow-up on 3/6 with Dr. Peñaloza

## 2024-03-08 NOTE — PHYSICAL EXAM
[No Acute Distress] : no acute distress [Well Nourished] : well nourished [Well Developed] : well developed [Well-Appearing] : well-appearing [Normal Sclera/Conjunctiva] : normal sclera/conjunctiva [PERRL] : pupils equal round and reactive to light [EOMI] : extraocular movements intact [Normal Outer Ear/Nose] : the outer ears and nose were normal in appearance [Normal Oropharynx] : the oropharynx was normal [No JVD] : no jugular venous distention [No Lymphadenopathy] : no lymphadenopathy [Supple] : supple [Thyroid Normal, No Nodules] : the thyroid was normal and there were no nodules present [No Respiratory Distress] : no respiratory distress  [No Accessory Muscle Use] : no accessory muscle use [Normal Rate] : normal rate  [Clear to Auscultation] : lungs were clear to auscultation bilaterally [Regular Rhythm] : with a regular rhythm [Normal S1, S2] : normal S1 and S2 [No Murmur] : no murmur heard [No Carotid Bruits] : no carotid bruits [No Abdominal Bruit] : a ~M bruit was not heard ~T in the abdomen [No Varicosities] : no varicosities [Pedal Pulses Present] : the pedal pulses are present [No Palpable Aorta] : no palpable aorta [No Extremity Clubbing/Cyanosis] : no extremity clubbing/cyanosis [Soft] : abdomen soft [Non Tender] : non-tender [Non-distended] : non-distended [No Masses] : no abdominal mass palpated [No HSM] : no HSM [Normal Bowel Sounds] : normal bowel sounds [Normal Posterior Cervical Nodes] : no posterior cervical lymphadenopathy [Normal Anterior Cervical Nodes] : no anterior cervical lymphadenopathy [No CVA Tenderness] : no CVA  tenderness [No Spinal Tenderness] : no spinal tenderness [No Joint Swelling] : no joint swelling [Grossly Normal Strength/Tone] : grossly normal strength/tone [No Rash] : no rash [Coordination Grossly Intact] : coordination grossly intact [No Focal Deficits] : no focal deficits [Normal Gait] : normal gait [Deep Tendon Reflexes (DTR)] : deep tendon reflexes were 2+ and symmetric [Normal Affect] : the affect was normal [Normal Insight/Judgement] : insight and judgment were intact [de-identified] : Trace ankle edema

## 2024-03-08 NOTE — PLAN
[FreeTextEntry1] : Mrs. Lara presents for follow-up evaluation.  1.  Patient will continue on current medication regimen which has been reviewed and revised. 2.  She will go for blood work as ordered by cardiology. 3.  Patient has a history of obstructive sleep apnea will continue on current AutoPap 5-15 cm H2O. 4.  Urology follow-up with Dr. Stevens for urinary incontinence. 5.  Cardiology follow-up with Dr. Peñaloza 6.  Follow-up in this office in 6 months.

## 2024-03-11 ENCOUNTER — APPOINTMENT (OUTPATIENT)
Dept: UROLOGY | Facility: CLINIC | Age: 84
End: 2024-03-11
Payer: MEDICARE

## 2024-03-11 VITALS
RESPIRATION RATE: 16 BRPM | HEART RATE: 70 BPM | HEIGHT: 65 IN | SYSTOLIC BLOOD PRESSURE: 126 MMHG | WEIGHT: 264 LBS | BODY MASS INDEX: 43.99 KG/M2 | DIASTOLIC BLOOD PRESSURE: 70 MMHG

## 2024-03-11 DIAGNOSIS — R35.0 FREQUENCY OF MICTURITION: ICD-10-CM

## 2024-03-11 DIAGNOSIS — R33.9 RETENTION OF URINE, UNSPECIFIED: ICD-10-CM

## 2024-03-11 DIAGNOSIS — R32 UNSPECIFIED URINARY INCONTINENCE: ICD-10-CM

## 2024-03-11 PROCEDURE — 99213 OFFICE O/P EST LOW 20 MIN: CPT

## 2024-03-11 RX ORDER — VIBEGRON 75 MG/1
75 TABLET, FILM COATED ORAL DAILY
Qty: 7 | Refills: 0 | Status: ACTIVE | OUTPATIENT
Start: 2024-03-11

## 2024-03-11 RX ORDER — MIRABEGRON 50 MG/1
50 TABLET, FILM COATED, EXTENDED RELEASE ORAL DAILY
Qty: 7 | Refills: 0 | Status: ACTIVE | OUTPATIENT
Start: 2024-03-11

## 2024-03-11 RX ORDER — MIRABEGRON 25 MG/1
25 TABLET, FILM COATED, EXTENDED RELEASE ORAL
Qty: 7 | Refills: 0 | Status: ACTIVE | OUTPATIENT
Start: 2024-03-11

## 2024-03-11 RX ORDER — VIBEGRON 75 MG/1
75 TABLET, FILM COATED ORAL
Qty: 7 | Refills: 0 | Status: ACTIVE | OUTPATIENT
Start: 2024-03-11

## 2024-03-11 RX ORDER — OXYBUTYNIN CHLORIDE 10 MG/1
10 TABLET, EXTENDED RELEASE ORAL
Qty: 10 | Refills: 0 | Status: ACTIVE | OUTPATIENT
Start: 2024-03-11

## 2024-03-11 RX ORDER — OXYBUTYNIN CHLORIDE 10 MG/1
10 TABLET, EXTENDED RELEASE ORAL
Qty: 7 | Refills: 0 | Status: ACTIVE | OUTPATIENT
Start: 2024-03-11

## 2024-03-11 NOTE — HISTORY OF PRESENT ILLNESS
[FreeTextEntry1] : Follow-up 03/11/2024: 82 y/o F patient attempted catheterization, but experienced catheters spontaneously dislodging, leading to irritation. Currently, she is not taking any medications but seeks potential interventions to address the issue. She reports no dysuria or other symptoms at this time.

## 2024-03-11 NOTE — ADDENDUM
[FreeTextEntry1] : I, Delma Carr, acted as a scribe on behalf of Dr. Stevens 0311/2024.   All medical record entries made by the Scribe were at my, Dr.Kip Stevens, direction and personally dictated by me on 03/11/2024. I have reviewed the chart and agree that the record accurately reflects my personal performance of the history, physical exam, assessment, and plan. I have also personally directed, reviewed, and agreed with the chart.

## 2024-03-11 NOTE — PHYSICAL EXAM
[Normal Appearance] : normal appearance [Well Groomed] : well groomed [Edema] : no peripheral edema [General Appearance - In No Acute Distress] : no acute distress [Respiration, Rhythm And Depth] : normal respiratory rhythm and effort [Exaggerated Use Of Accessory Muscles For Inspiration] : no accessory muscle use [Abdomen Soft] : soft [Abdomen Tenderness] : non-tender [Costovertebral Angle Tenderness] : no ~M costovertebral angle tenderness [Urinary Bladder Findings] : the bladder was normal on palpation [Normal Station and Gait] : the gait and station were normal for the patient's age [No Focal Deficits] : no focal deficits [] : no rash [Oriented To Time, Place, And Person] : oriented to person, place, and time [Affect] : the affect was normal [Mood] : the mood was normal [No Palpable Adenopathy] : no palpable adenopathy

## 2024-03-11 NOTE — END OF VISIT
[FreeTextEntry3] : Recommendations: She will trial Myrbetriq 25 MG and 50 MG Oral Tablet Extended Release 24 Hour; Take 1 tablet daily. Gemtesa 75 MG Oral Tablet; TAKE 1 TABLET Daily take 1 tablet daily for 1 week, and doseoxyBUTYnin Chloride ER 10 MG Oral Tablet Extended Release 24 Hour; 1 tab PO OD HS, disp 7. She will follow up  with a urodynamic study in one month.

## 2024-03-21 ENCOUNTER — RX RENEWAL (OUTPATIENT)
Age: 84
End: 2024-03-21

## 2024-04-15 ENCOUNTER — APPOINTMENT (OUTPATIENT)
Dept: UROLOGY | Facility: CLINIC | Age: 84
End: 2024-04-15

## 2024-04-25 ENCOUNTER — RX RENEWAL (OUTPATIENT)
Age: 84
End: 2024-04-25

## 2024-04-25 RX ORDER — FESOTERODINE FUMARATE 8 MG/1
8 TABLET, FILM COATED, EXTENDED RELEASE ORAL
Qty: 90 | Refills: 2 | Status: ACTIVE | COMMUNITY
Start: 2023-02-21 | End: 1900-01-01

## 2024-05-01 ENCOUNTER — RX RENEWAL (OUTPATIENT)
Age: 84
End: 2024-05-01

## 2024-05-01 RX ORDER — NATEGLINIDE 120 MG/1
120 TABLET, COATED ORAL 3 TIMES DAILY
Qty: 90 | Refills: 5 | Status: ACTIVE | COMMUNITY
Start: 1900-01-01 | End: 1900-01-01

## 2024-05-15 ENCOUNTER — APPOINTMENT (OUTPATIENT)
Dept: UROLOGY | Facility: CLINIC | Age: 84
End: 2024-05-15
Payer: MEDICARE

## 2024-05-15 VITALS
DIASTOLIC BLOOD PRESSURE: 67 MMHG | HEIGHT: 65 IN | WEIGHT: 264 LBS | SYSTOLIC BLOOD PRESSURE: 110 MMHG | HEART RATE: 61 BPM | BODY MASS INDEX: 43.99 KG/M2 | RESPIRATION RATE: 16 BRPM

## 2024-05-15 DIAGNOSIS — N39.46 MIXED INCONTINENCE: ICD-10-CM

## 2024-05-15 PROCEDURE — 51798 US URINE CAPACITY MEASURE: CPT

## 2024-05-15 PROCEDURE — 51729 CYSTOMETROGRAM W/VP&UP: CPT

## 2024-05-15 PROCEDURE — 51784 ANAL/URINARY MUSCLE STUDY: CPT

## 2024-05-15 PROCEDURE — 51797 INTRAABDOMINAL PRESSURE TEST: CPT

## 2024-06-19 NOTE — PATIENT PROFILE ADULT - CONTRAINDICATIONS & PRECAUTIONS (SELECT ALL THAT APPLY)
Glucose 228.  I would recommend increasing your insulin from 16 units up to 18 units Basaglar  Renal function a bit lower than it was last time  Cholesterol quite a bit higher at 267  CBC shows improved hemoglobin  Repeat CBC CMP lipids hemoglobin A1c 3 months    
none...

## 2024-07-09 ENCOUNTER — APPOINTMENT (OUTPATIENT)
Dept: INTERNAL MEDICINE | Facility: CLINIC | Age: 84
End: 2024-07-09
Payer: MEDICARE

## 2024-07-09 VITALS
HEIGHT: 65 IN | TEMPERATURE: 98.9 F | DIASTOLIC BLOOD PRESSURE: 80 MMHG | HEART RATE: 64 BPM | WEIGHT: 260 LBS | SYSTOLIC BLOOD PRESSURE: 130 MMHG | OXYGEN SATURATION: 89 % | BODY MASS INDEX: 43.32 KG/M2

## 2024-07-09 DIAGNOSIS — Z86.39 PERSONAL HISTORY OF OTHER ENDOCRINE, NUTRITIONAL AND METABOLIC DISEASE: ICD-10-CM

## 2024-07-09 DIAGNOSIS — G47.33 OBSTRUCTIVE SLEEP APNEA (ADULT) (PEDIATRIC): ICD-10-CM

## 2024-07-09 DIAGNOSIS — N32.9 BLADDER DISORDER, UNSPECIFIED: ICD-10-CM

## 2024-07-09 DIAGNOSIS — E03.9 HYPOTHYROIDISM, UNSPECIFIED: ICD-10-CM

## 2024-07-09 DIAGNOSIS — Z87.898 PERSONAL HISTORY OF OTHER SPECIFIED CONDITIONS: ICD-10-CM

## 2024-07-09 DIAGNOSIS — N39.0 INFECTION AND INFLAMMATORY REACTION DUE TO INDWELLING URETHRAL CATHETER, SUBSEQUENT ENCOUNTER: ICD-10-CM

## 2024-07-09 DIAGNOSIS — R06.09 OTHER FORMS OF DYSPNEA: ICD-10-CM

## 2024-07-09 DIAGNOSIS — F41.9 ANXIETY DISORDER, UNSPECIFIED: ICD-10-CM

## 2024-07-09 DIAGNOSIS — E78.00 PURE HYPERCHOLESTEROLEMIA, UNSPECIFIED: ICD-10-CM

## 2024-07-09 DIAGNOSIS — Z86.03 PERSONAL HISTORY OF NEOPLASM OF UNCERTAIN BEHAVIOR: ICD-10-CM

## 2024-07-09 DIAGNOSIS — Z87.891 PERSONAL HISTORY OF NICOTINE DEPENDENCE: ICD-10-CM

## 2024-07-09 DIAGNOSIS — M25.561 PAIN IN RIGHT KNEE: ICD-10-CM

## 2024-07-09 DIAGNOSIS — I27.20 PULMONARY HYPERTENSION, UNSPECIFIED: ICD-10-CM

## 2024-07-09 DIAGNOSIS — Z86.018 PERSONAL HISTORY OF OTHER BENIGN NEOPLASM: ICD-10-CM

## 2024-07-09 DIAGNOSIS — T83.511D INFECTION AND INFLAMMATORY REACTION DUE TO INDWELLING URETHRAL CATHETER, SUBSEQUENT ENCOUNTER: ICD-10-CM

## 2024-07-09 DIAGNOSIS — G89.29 PAIN IN RIGHT KNEE: ICD-10-CM

## 2024-07-09 DIAGNOSIS — Z86.79 PERSONAL HISTORY OF OTHER DISEASES OF THE CIRCULATORY SYSTEM: ICD-10-CM

## 2024-07-09 PROCEDURE — 99214 OFFICE O/P EST MOD 30 MIN: CPT

## 2024-07-09 RX ORDER — ESCITALOPRAM OXALATE 20 MG/1
20 TABLET ORAL
Qty: 90 | Refills: 2 | Status: ACTIVE | COMMUNITY
Start: 2024-07-09 | End: 1900-01-01

## 2024-07-17 ENCOUNTER — APPOINTMENT (OUTPATIENT)
Dept: CARDIOLOGY | Facility: CLINIC | Age: 84
End: 2024-07-17
Payer: MEDICARE

## 2024-07-17 ENCOUNTER — NON-APPOINTMENT (OUTPATIENT)
Age: 84
End: 2024-07-17

## 2024-07-17 VITALS
WEIGHT: 266 LBS | HEIGHT: 65 IN | OXYGEN SATURATION: 93 % | BODY MASS INDEX: 44.32 KG/M2 | SYSTOLIC BLOOD PRESSURE: 122 MMHG | HEART RATE: 61 BPM | DIASTOLIC BLOOD PRESSURE: 58 MMHG | RESPIRATION RATE: 15 BRPM

## 2024-07-17 VITALS
SYSTOLIC BLOOD PRESSURE: 122 MMHG | OXYGEN SATURATION: 93 % | BODY MASS INDEX: 44.32 KG/M2 | DIASTOLIC BLOOD PRESSURE: 58 MMHG | HEART RATE: 61 BPM | WEIGHT: 266 LBS | HEIGHT: 65 IN

## 2024-07-17 DIAGNOSIS — I50.30 UNSPECIFIED DIASTOLIC (CONGESTIVE) HEART FAILURE: ICD-10-CM

## 2024-07-17 DIAGNOSIS — E66.01 MORBID (SEVERE) OBESITY DUE TO EXCESS CALORIES: ICD-10-CM

## 2024-07-17 DIAGNOSIS — I10 ESSENTIAL (PRIMARY) HYPERTENSION: ICD-10-CM

## 2024-07-17 DIAGNOSIS — I34.0 NONRHEUMATIC MITRAL (VALVE) INSUFFICIENCY: ICD-10-CM

## 2024-07-17 PROCEDURE — 99214 OFFICE O/P EST MOD 30 MIN: CPT

## 2024-07-17 PROCEDURE — G2211 COMPLEX E/M VISIT ADD ON: CPT

## 2024-07-17 PROCEDURE — 93000 ELECTROCARDIOGRAM COMPLETE: CPT

## 2024-07-18 ENCOUNTER — APPOINTMENT (OUTPATIENT)
Dept: ORTHOPEDIC SURGERY | Facility: CLINIC | Age: 84
End: 2024-07-18

## 2024-07-18 VITALS
BODY MASS INDEX: 44.32 KG/M2 | TEMPERATURE: 97 F | DIASTOLIC BLOOD PRESSURE: 63 MMHG | HEART RATE: 59 BPM | HEIGHT: 65 IN | WEIGHT: 266 LBS | SYSTOLIC BLOOD PRESSURE: 137 MMHG

## 2024-07-18 DIAGNOSIS — M17.11 UNILATERAL PRIMARY OSTEOARTHRITIS, RIGHT KNEE: ICD-10-CM

## 2024-07-18 PROCEDURE — 73650 X-RAY EXAM OF HEEL: CPT | Mod: RT

## 2024-07-18 PROCEDURE — 99203 OFFICE O/P NEW LOW 30 MIN: CPT

## 2024-07-23 PROBLEM — M17.11 PRIMARY OSTEOARTHRITIS OF RIGHT KNEE: Status: ACTIVE | Noted: 2024-07-18

## 2024-08-05 ENCOUNTER — RX RENEWAL (OUTPATIENT)
Age: 84
End: 2024-08-05

## 2024-08-06 ENCOUNTER — OFFICE (OUTPATIENT)
Dept: URBAN - METROPOLITAN AREA CLINIC 102 | Facility: CLINIC | Age: 84
Setting detail: OPHTHALMOLOGY
End: 2024-08-06
Payer: MEDICARE

## 2024-08-06 DIAGNOSIS — Z96.1: ICD-10-CM

## 2024-08-06 DIAGNOSIS — E11.3391: ICD-10-CM

## 2024-08-06 DIAGNOSIS — H26.492: ICD-10-CM

## 2024-08-06 PROCEDURE — 99204 OFFICE O/P NEW MOD 45 MIN: CPT | Performed by: OPHTHALMOLOGY

## 2024-08-06 PROCEDURE — 92134 CPTRZ OPH DX IMG PST SGM RTA: CPT | Performed by: OPHTHALMOLOGY

## 2024-08-06 ASSESSMENT — CONFRONTATIONAL VISUAL FIELD TEST (CVF)
OS_FINDINGS: FULL
OD_FINDINGS: FULL

## 2024-08-12 ENCOUNTER — RX RENEWAL (OUTPATIENT)
Age: 84
End: 2024-08-12

## 2024-08-12 ENCOUNTER — OFFICE (OUTPATIENT)
Dept: URBAN - METROPOLITAN AREA CLINIC 102 | Facility: CLINIC | Age: 84
Setting detail: OPHTHALMOLOGY
End: 2024-08-12
Payer: MEDICARE

## 2024-08-12 DIAGNOSIS — M17.11 UNILATERAL PRIMARY OSTEOARTHRITIS, RIGHT KNEE: ICD-10-CM

## 2024-08-12 DIAGNOSIS — H26.492: ICD-10-CM

## 2024-08-12 DIAGNOSIS — H43.813: ICD-10-CM

## 2024-08-12 DIAGNOSIS — E11.3293: ICD-10-CM

## 2024-08-12 DIAGNOSIS — D31.30: ICD-10-CM

## 2024-08-12 PROCEDURE — 66821 AFTER CATARACT LASER SURGERY: CPT | Mod: LT | Performed by: OPHTHALMOLOGY

## 2024-08-12 ASSESSMENT — CONFRONTATIONAL VISUAL FIELD TEST (CVF)
OD_FINDINGS: FULL
OS_FINDINGS: FULL

## 2024-08-27 ENCOUNTER — OFFICE (OUTPATIENT)
Dept: URBAN - METROPOLITAN AREA CLINIC 102 | Facility: CLINIC | Age: 84
Setting detail: OPHTHALMOLOGY
End: 2024-08-27
Payer: MEDICARE

## 2024-08-27 DIAGNOSIS — H43.813: ICD-10-CM

## 2024-08-27 DIAGNOSIS — H26.492: ICD-10-CM

## 2024-08-27 DIAGNOSIS — E11.3593: ICD-10-CM

## 2024-08-27 DIAGNOSIS — Z96.1: ICD-10-CM

## 2024-08-27 DIAGNOSIS — D31.30: ICD-10-CM

## 2024-08-27 PROCEDURE — 99024 POSTOP FOLLOW-UP VISIT: CPT | Performed by: OPHTHALMOLOGY

## 2024-08-27 ASSESSMENT — CONFRONTATIONAL VISUAL FIELD TEST (CVF)
OS_FINDINGS: FULL
OD_FINDINGS: FULL

## 2024-09-16 ENCOUNTER — RX RENEWAL (OUTPATIENT)
Age: 84
End: 2024-09-16

## 2024-09-17 ENCOUNTER — NON-APPOINTMENT (OUTPATIENT)
Age: 84
End: 2024-09-17

## 2024-09-25 ENCOUNTER — APPOINTMENT (OUTPATIENT)
Dept: ORTHOPEDIC SURGERY | Facility: CLINIC | Age: 84
End: 2024-09-25

## 2024-09-25 VITALS
WEIGHT: 264 LBS | SYSTOLIC BLOOD PRESSURE: 116 MMHG | HEART RATE: 65 BPM | HEIGHT: 65 IN | BODY MASS INDEX: 43.99 KG/M2 | DIASTOLIC BLOOD PRESSURE: 66 MMHG

## 2024-09-25 DIAGNOSIS — M17.11 UNILATERAL PRIMARY OSTEOARTHRITIS, RIGHT KNEE: ICD-10-CM

## 2024-09-25 PROCEDURE — 99212 OFFICE O/P EST SF 10 MIN: CPT

## 2024-09-26 NOTE — PHYSICAL EXAM
[de-identified] : Right Knee: Range of Motion in Degrees                                             Claimant:              Normal: Flexion Active:                        120                135-degrees Flexion Passive:                     120                135-degrees Extension Active:                    10                  0-5-degrees Extension Passive:                 10                  0-5-degrees    No weakness to flexion/extension. No evidence of instability in the AP plane or varus or valgus stress. Negative Lachman. Negative pivot shift.  Negative anterior drawer test.  Negative posterior drawer test. Negative Ilir. Negative Apley grind.  No medial or lateral joint line tenderness.  Positive tenderness over the medial and lateral facet of the patella.  Positive patellofemoral crepitations.  No lateral tilting patella.  No patella apprehension.  Positive crepitation in the medial and lateral femoral condyle.  No proximal or distal swelling, edema or tenderness.  No gross motor or sensory deficits.  Mild intra-articular swelling. 2+ DP and PT pulses. No varus or valgus malalignment. Skin is intact. No rashes, scars or lesions.   [de-identified] : Gait and Station:  Ambulating with a slightly antalgic to antalgic gait.  Station:  Normal.  [de-identified] : Appearance:  Well-developed, well-nourished female in no acute distress.

## 2024-09-26 NOTE — PHYSICAL EXAM
[de-identified] : Right Knee: Range of Motion in Degrees                                             Claimant:              Normal: Flexion Active:                        120                135-degrees Flexion Passive:                     120                135-degrees Extension Active:                    10                  0-5-degrees Extension Passive:                 10                  0-5-degrees    No weakness to flexion/extension. No evidence of instability in the AP plane or varus or valgus stress. Negative Lachman. Negative pivot shift.  Negative anterior drawer test.  Negative posterior drawer test. Negative Ilir. Negative Apley grind.  No medial or lateral joint line tenderness.  Positive tenderness over the medial and lateral facet of the patella.  Positive patellofemoral crepitations.  No lateral tilting patella.  No patella apprehension.  Positive crepitation in the medial and lateral femoral condyle.  No proximal or distal swelling, edema or tenderness.  No gross motor or sensory deficits.  Mild intra-articular swelling. 2+ DP and PT pulses. No varus or valgus malalignment. Skin is intact. No rashes, scars or lesions.   [de-identified] : Gait and Station:  Ambulating with a slightly antalgic to antalgic gait.  Station:  Normal.  [de-identified] : Appearance:  Well-developed, well-nourished female in no acute distress.

## 2024-09-26 NOTE — DISCUSSION/SUMMARY
[de-identified] : The patient presents with osteoarthritis of the right knee.  At this time, I recommend home therapeutic modalities.  She will follow up with me on an as needed basis.

## 2024-09-26 NOTE — DISCUSSION/SUMMARY
[de-identified] : The patient presents with osteoarthritis of the right knee.  At this time, I recommend home therapeutic modalities.  She will follow up with me on an as needed basis.

## 2024-09-26 NOTE — ADDENDUM
[FreeTextEntry1] : This note was written by Xiao Olea on 09/26/2024 acting as scribe for Fredrick Kay III, MD

## 2024-10-11 DIAGNOSIS — M17.11 UNILATERAL PRIMARY OSTEOARTHRITIS, RIGHT KNEE: ICD-10-CM

## 2024-10-28 ENCOUNTER — RX RENEWAL (OUTPATIENT)
Age: 84
End: 2024-10-28

## 2024-10-29 ENCOUNTER — RX RENEWAL (OUTPATIENT)
Age: 84
End: 2024-10-29

## 2024-12-03 ENCOUNTER — OFFICE (OUTPATIENT)
Dept: URBAN - METROPOLITAN AREA CLINIC 102 | Facility: CLINIC | Age: 84
Setting detail: OPHTHALMOLOGY
End: 2024-12-03
Payer: MEDICARE

## 2024-12-03 DIAGNOSIS — H26.492: ICD-10-CM

## 2024-12-03 DIAGNOSIS — Z96.1: ICD-10-CM

## 2024-12-03 DIAGNOSIS — H43.813: ICD-10-CM

## 2024-12-03 DIAGNOSIS — E11.3593: ICD-10-CM

## 2024-12-03 DIAGNOSIS — D31.30: ICD-10-CM

## 2024-12-03 PROCEDURE — 92134 CPTRZ OPH DX IMG PST SGM RTA: CPT | Performed by: OPHTHALMOLOGY

## 2024-12-03 PROCEDURE — 99213 OFFICE O/P EST LOW 20 MIN: CPT | Performed by: OPHTHALMOLOGY

## 2024-12-03 ASSESSMENT — KERATOMETRY
OS_AXISANGLE_DEGREES: 173
OS_K1POWER_DIOPTERS: 43.50
METHOD_AUTO_MANUAL: AUTO
OD_AXISANGLE_DEGREES: 97
OD_K2POWER_DIOPTERS: 43.50
OD_K1POWER_DIOPTERS: 43.00
OS_K2POWER_DIOPTERS: 43.75

## 2024-12-03 ASSESSMENT — REFRACTION_AUTOREFRACTION
OD_SPHERE: -0.50
OS_SPHERE: 0.00
OD_AXIS: 23
OS_CYLINDER: -0.75
OD_CYLINDER: -0.25
OS_AXIS: 92

## 2024-12-03 ASSESSMENT — REFRACTION_MANIFEST
OD_AXIS: 00
OS_AXIS: 75
OS_SPHERE: -3.50
OD_CYLINDER: 0.00
OS_VA1: 20/NI
OS_CYLINDER: -1.00
OD_SPHERE: -0.25

## 2024-12-03 ASSESSMENT — TONOMETRY
OD_IOP_MMHG: 16
OS_IOP_MMHG: 16

## 2024-12-03 ASSESSMENT — VISUAL ACUITY
OD_BCVA: 20/30-2
OS_BCVA: 20/25

## 2024-12-03 ASSESSMENT — CONFRONTATIONAL VISUAL FIELD TEST (CVF)
OD_FINDINGS: FULL
OS_FINDINGS: FULL

## 2024-12-05 DIAGNOSIS — M17.11 UNILATERAL PRIMARY OSTEOARTHRITIS, RIGHT KNEE: ICD-10-CM

## 2024-12-18 ENCOUNTER — RX RENEWAL (OUTPATIENT)
Age: 84
End: 2024-12-18

## 2024-12-18 ENCOUNTER — NON-APPOINTMENT (OUTPATIENT)
Age: 84
End: 2024-12-18

## 2024-12-18 ENCOUNTER — APPOINTMENT (OUTPATIENT)
Dept: CARDIOLOGY | Facility: CLINIC | Age: 84
End: 2024-12-18
Payer: MEDICARE

## 2024-12-18 VITALS
WEIGHT: 264 LBS | OXYGEN SATURATION: 91 % | HEIGHT: 65 IN | HEART RATE: 78 BPM | SYSTOLIC BLOOD PRESSURE: 118 MMHG | DIASTOLIC BLOOD PRESSURE: 66 MMHG | BODY MASS INDEX: 43.99 KG/M2

## 2024-12-18 DIAGNOSIS — I50.30 UNSPECIFIED DIASTOLIC (CONGESTIVE) HEART FAILURE: ICD-10-CM

## 2024-12-18 DIAGNOSIS — E11.9 TYPE 2 DIABETES MELLITUS W/OUT COMPLICATIONS: ICD-10-CM

## 2024-12-18 DIAGNOSIS — I34.0 NONRHEUMATIC MITRAL (VALVE) INSUFFICIENCY: ICD-10-CM

## 2024-12-18 DIAGNOSIS — Z87.898 PERSONAL HISTORY OF OTHER SPECIFIED CONDITIONS: ICD-10-CM

## 2024-12-18 DIAGNOSIS — I10 ESSENTIAL (PRIMARY) HYPERTENSION: ICD-10-CM

## 2024-12-18 DIAGNOSIS — E66.01 MORBID (SEVERE) OBESITY DUE TO EXCESS CALORIES: ICD-10-CM

## 2024-12-18 PROCEDURE — G2211 COMPLEX E/M VISIT ADD ON: CPT

## 2024-12-18 PROCEDURE — 99214 OFFICE O/P EST MOD 30 MIN: CPT

## 2024-12-18 PROCEDURE — 93000 ELECTROCARDIOGRAM COMPLETE: CPT

## 2024-12-21 ENCOUNTER — RX RENEWAL (OUTPATIENT)
Age: 84
End: 2024-12-21

## 2024-12-23 RX ORDER — BLOOD SUGAR DIAGNOSTIC
STRIP MISCELLANEOUS DAILY
Qty: 1 | Refills: 3 | Status: ACTIVE | COMMUNITY
Start: 2024-12-23 | End: 1900-01-01

## 2024-12-27 ENCOUNTER — OFFICE (OUTPATIENT)
Dept: URBAN - METROPOLITAN AREA CLINIC 88 | Facility: CLINIC | Age: 84
Setting detail: OPHTHALMOLOGY
End: 2024-12-27
Payer: MEDICARE

## 2024-12-27 DIAGNOSIS — H34.8320: ICD-10-CM

## 2024-12-27 DIAGNOSIS — E11.3593: ICD-10-CM

## 2024-12-27 DIAGNOSIS — D31.30: ICD-10-CM

## 2024-12-27 DIAGNOSIS — H43.813: ICD-10-CM

## 2024-12-27 PROCEDURE — 92014 COMPRE OPH EXAM EST PT 1/>: CPT | Performed by: OPHTHALMOLOGY

## 2024-12-27 PROCEDURE — 92250 FUNDUS PHOTOGRAPHY W/I&R: CPT | Performed by: OPHTHALMOLOGY

## 2024-12-27 ASSESSMENT — TONOMETRY
OD_IOP_MMHG: 14
OS_IOP_MMHG: 17

## 2024-12-27 ASSESSMENT — KERATOMETRY
OS_AXISANGLE_DEGREES: 173
OD_AXISANGLE_DEGREES: 97
METHOD_AUTO_MANUAL: AUTO
OS_K2POWER_DIOPTERS: 43.75
OD_K2POWER_DIOPTERS: 43.50
OD_K1POWER_DIOPTERS: 43.00
OS_K1POWER_DIOPTERS: 43.50

## 2024-12-27 ASSESSMENT — REFRACTION_AUTOREFRACTION
OS_SPHERE: 0.00
OD_AXIS: 23
OD_CYLINDER: -0.25
OS_AXIS: 92
OD_SPHERE: -0.50
OS_CYLINDER: -0.75

## 2024-12-27 ASSESSMENT — VISUAL ACUITY
OD_BCVA: 20/25
OS_BCVA: 20/20

## 2024-12-27 ASSESSMENT — CONFRONTATIONAL VISUAL FIELD TEST (CVF)
OS_FINDINGS: FULL
OD_FINDINGS: FULL

## 2025-01-13 ENCOUNTER — APPOINTMENT (OUTPATIENT)
Dept: INTERNAL MEDICINE | Facility: CLINIC | Age: 85
End: 2025-01-13
Payer: MEDICARE

## 2025-01-13 VITALS
HEART RATE: 67 BPM | WEIGHT: 264 LBS | BODY MASS INDEX: 48.58 KG/M2 | SYSTOLIC BLOOD PRESSURE: 185 MMHG | TEMPERATURE: 97.3 F | DIASTOLIC BLOOD PRESSURE: 69 MMHG | OXYGEN SATURATION: 93 % | HEIGHT: 62 IN | RESPIRATION RATE: 16 BRPM

## 2025-01-13 DIAGNOSIS — M25.561 PAIN IN RIGHT KNEE: ICD-10-CM

## 2025-01-13 DIAGNOSIS — I27.20 PULMONARY HYPERTENSION, UNSPECIFIED: ICD-10-CM

## 2025-01-13 DIAGNOSIS — G89.29 PAIN IN RIGHT KNEE: ICD-10-CM

## 2025-01-13 DIAGNOSIS — G47.33 OBSTRUCTIVE SLEEP APNEA (ADULT) (PEDIATRIC): ICD-10-CM

## 2025-01-13 DIAGNOSIS — Z86.79 PERSONAL HISTORY OF OTHER DISEASES OF THE CIRCULATORY SYSTEM: ICD-10-CM

## 2025-01-13 DIAGNOSIS — Z87.2 PERSONAL HISTORY OF DISEASES OF THE SKIN AND SUBCUTANEOUS TISSUE: ICD-10-CM

## 2025-01-13 DIAGNOSIS — Z87.898 PERSONAL HISTORY OF OTHER SPECIFIED CONDITIONS: ICD-10-CM

## 2025-01-13 DIAGNOSIS — N34.3 URETHRAL SYNDROME, UNSPECIFIED: ICD-10-CM

## 2025-01-13 DIAGNOSIS — I10 ESSENTIAL (PRIMARY) HYPERTENSION: ICD-10-CM

## 2025-01-13 DIAGNOSIS — E78.00 PURE HYPERCHOLESTEROLEMIA, UNSPECIFIED: ICD-10-CM

## 2025-01-13 DIAGNOSIS — Z87.891 PERSONAL HISTORY OF NICOTINE DEPENDENCE: ICD-10-CM

## 2025-01-13 DIAGNOSIS — E11.9 TYPE 2 DIABETES MELLITUS W/OUT COMPLICATIONS: ICD-10-CM

## 2025-01-13 DIAGNOSIS — E03.9 HYPOTHYROIDISM, UNSPECIFIED: ICD-10-CM

## 2025-01-13 DIAGNOSIS — E66.01 MORBID (SEVERE) OBESITY DUE TO EXCESS CALORIES: ICD-10-CM

## 2025-01-13 DIAGNOSIS — I50.30 UNSPECIFIED DIASTOLIC (CONGESTIVE) HEART FAILURE: ICD-10-CM

## 2025-01-13 PROCEDURE — 99214 OFFICE O/P EST MOD 30 MIN: CPT

## 2025-01-13 PROCEDURE — G2211 COMPLEX E/M VISIT ADD ON: CPT

## 2025-01-13 PROCEDURE — ZZZZZ: CPT

## 2025-01-13 RX ORDER — MELOXICAM 15 MG/1
15 TABLET ORAL
Refills: 0 | Status: ACTIVE | COMMUNITY

## 2025-02-10 ENCOUNTER — RX RENEWAL (OUTPATIENT)
Age: 85
End: 2025-02-10

## 2025-02-24 ENCOUNTER — RX RENEWAL (OUTPATIENT)
Age: 85
End: 2025-02-24

## 2025-05-20 ENCOUNTER — RX RENEWAL (OUTPATIENT)
Age: 85
End: 2025-05-20

## 2025-05-27 ENCOUNTER — APPOINTMENT (OUTPATIENT)
Dept: INTERNAL MEDICINE | Facility: CLINIC | Age: 85
End: 2025-05-27
Payer: MEDICARE

## 2025-05-27 VITALS
DIASTOLIC BLOOD PRESSURE: 76 MMHG | RESPIRATION RATE: 16 BRPM | HEART RATE: 71 BPM | SYSTOLIC BLOOD PRESSURE: 130 MMHG | HEIGHT: 62 IN | WEIGHT: 256 LBS | BODY MASS INDEX: 47.11 KG/M2 | TEMPERATURE: 99 F | OXYGEN SATURATION: 91 %

## 2025-05-27 DIAGNOSIS — E03.9 HYPOTHYROIDISM, UNSPECIFIED: ICD-10-CM

## 2025-05-27 DIAGNOSIS — G47.33 OBSTRUCTIVE SLEEP APNEA (ADULT) (PEDIATRIC): ICD-10-CM

## 2025-05-27 DIAGNOSIS — E11.9 TYPE 2 DIABETES MELLITUS W/OUT COMPLICATIONS: ICD-10-CM

## 2025-05-27 DIAGNOSIS — G47.34 IDIOPATHIC SLEEP RELATED NONOBSTRUCTIVE ALVEOLAR HYPOVENTILATION: ICD-10-CM

## 2025-05-27 DIAGNOSIS — I27.20 PULMONARY HYPERTENSION, UNSPECIFIED: ICD-10-CM

## 2025-05-27 DIAGNOSIS — I10 ESSENTIAL (PRIMARY) HYPERTENSION: ICD-10-CM

## 2025-05-27 DIAGNOSIS — I50.30 UNSPECIFIED DIASTOLIC (CONGESTIVE) HEART FAILURE: ICD-10-CM

## 2025-05-27 DIAGNOSIS — Z87.891 PERSONAL HISTORY OF NICOTINE DEPENDENCE: ICD-10-CM

## 2025-05-27 DIAGNOSIS — E78.00 PURE HYPERCHOLESTEROLEMIA, UNSPECIFIED: ICD-10-CM

## 2025-05-27 PROCEDURE — 99214 OFFICE O/P EST MOD 30 MIN: CPT

## 2025-05-27 PROCEDURE — G2211 COMPLEX E/M VISIT ADD ON: CPT

## 2025-05-27 RX ORDER — PREDNISONE 10 MG/1
10 TABLET ORAL
Refills: 0 | Status: ACTIVE | COMMUNITY

## 2025-06-05 ENCOUNTER — APPOINTMENT (OUTPATIENT)
Dept: UROGYNECOLOGY | Facility: CLINIC | Age: 85
End: 2025-06-05

## 2025-06-05 PROCEDURE — 99203 OFFICE O/P NEW LOW 30 MIN: CPT | Mod: 25

## 2025-06-05 PROCEDURE — 51701 INSERT BLADDER CATHETER: CPT

## 2025-06-05 PROCEDURE — 99459 PELVIC EXAMINATION: CPT

## 2025-06-11 ENCOUNTER — RX RENEWAL (OUTPATIENT)
Age: 85
End: 2025-06-11

## 2025-06-26 PROBLEM — R32 URINARY INCONTINENCE: Status: ACTIVE | Noted: 2025-06-26

## 2025-06-26 PROBLEM — I51.9 HEART DISEASE: Status: ACTIVE | Noted: 2025-06-26

## 2025-06-26 PROBLEM — Z82.49 FAMILY HISTORY OF HYPERTENSION: Status: ACTIVE | Noted: 2025-06-26

## 2025-06-26 PROBLEM — R10.2 PELVIC PAIN: Status: ACTIVE | Noted: 2025-06-26

## 2025-06-26 PROBLEM — Z87.09 HISTORY OF THROAT PROBLEM: Status: ACTIVE | Noted: 2025-06-26

## 2025-06-26 PROBLEM — Z63.4 WIDOWED: Status: ACTIVE | Noted: 2025-06-26

## 2025-06-26 PROBLEM — Z83.49 FAMILY HISTORY OF OBESITY: Status: ACTIVE | Noted: 2025-06-26

## 2025-06-26 PROBLEM — M71.9 BURSITIS: Status: ACTIVE | Noted: 2025-06-26

## 2025-06-26 PROBLEM — R39.13 INTERMITTENT URINARY STREAM: Status: ACTIVE | Noted: 2025-06-26

## 2025-06-26 PROBLEM — R73.9 ELEVATED BLOOD SUGAR: Status: ACTIVE | Noted: 2025-06-26

## 2025-06-26 PROBLEM — E78.00 HIGH BLOOD CHOLESTEROL: Status: ACTIVE | Noted: 2020-01-30

## 2025-06-30 ENCOUNTER — APPOINTMENT (OUTPATIENT)
Dept: UROGYNECOLOGY | Facility: CLINIC | Age: 85
End: 2025-06-30

## 2025-06-30 VITALS — SYSTOLIC BLOOD PRESSURE: 116 MMHG | DIASTOLIC BLOOD PRESSURE: 60 MMHG

## 2025-06-30 PROCEDURE — 51729 CYSTOMETROGRAM W/VP&UP: CPT

## 2025-06-30 PROCEDURE — 51797 INTRAABDOMINAL PRESSURE TEST: CPT

## 2025-06-30 PROCEDURE — 51741 ELECTRO-UROFLOWMETRY FIRST: CPT

## 2025-06-30 PROCEDURE — 51784 ANAL/URINARY MUSCLE STUDY: CPT

## 2025-07-01 LAB
APPEARANCE: CLEAR
BACTERIA: ABNORMAL /HPF
BILIRUBIN URINE: NEGATIVE
BLOOD URINE: NEGATIVE
CAST: 1 /LPF
COLOR: NORMAL
EPITHELIAL CELLS: 6 /HPF
GLUCOSE QUALITATIVE U: >=1000 MG/DL
HYALINE CASTS: PRESENT
KETONES URINE: ABNORMAL MG/DL
LEUKOCYTE ESTERASE URINE: ABNORMAL
MICROSCOPIC-UA: NORMAL
NITRITE URINE: NEGATIVE
PH URINE: 5.5
PROTEIN URINE: NORMAL MG/DL
RED BLOOD CELLS URINE: 2 /HPF
REVIEW: NORMAL
SPECIFIC GRAVITY URINE: 1.02
UROBILINOGEN URINE: 1 MG/DL
WHITE BLOOD CELLS URINE: 8 /HPF

## 2025-07-03 PROBLEM — N39.0 ACUTE UTI: Status: ACTIVE | Noted: 2025-07-03 | Resolved: 2025-08-02

## 2025-07-03 RX ORDER — CEPHALEXIN 500 MG/1
500 CAPSULE ORAL
Qty: 14 | Refills: 0 | Status: ACTIVE | COMMUNITY
Start: 2025-07-03 | End: 1900-01-01

## 2025-08-01 ENCOUNTER — APPOINTMENT (OUTPATIENT)
Dept: INTERNAL MEDICINE | Facility: CLINIC | Age: 85
End: 2025-08-01
Payer: MEDICARE

## 2025-08-01 VITALS
HEART RATE: 64 BPM | TEMPERATURE: 98.4 F | OXYGEN SATURATION: 92 % | HEIGHT: 62 IN | DIASTOLIC BLOOD PRESSURE: 72 MMHG | SYSTOLIC BLOOD PRESSURE: 122 MMHG | BODY MASS INDEX: 45.64 KG/M2 | WEIGHT: 248 LBS | RESPIRATION RATE: 15 BRPM

## 2025-08-01 DIAGNOSIS — Z01.818 ENCOUNTER FOR OTHER PREPROCEDURAL EXAMINATION: ICD-10-CM

## 2025-08-01 DIAGNOSIS — R32 UNSPECIFIED URINARY INCONTINENCE: ICD-10-CM

## 2025-08-01 PROCEDURE — 99215 OFFICE O/P EST HI 40 MIN: CPT

## 2025-08-06 ENCOUNTER — APPOINTMENT (OUTPATIENT)
Dept: CARDIOLOGY | Facility: CLINIC | Age: 85
End: 2025-08-06
Payer: MEDICARE

## 2025-08-06 VITALS
HEART RATE: 68 BPM | SYSTOLIC BLOOD PRESSURE: 128 MMHG | DIASTOLIC BLOOD PRESSURE: 60 MMHG | HEIGHT: 62 IN | OXYGEN SATURATION: 92 % | BODY MASS INDEX: 46.19 KG/M2 | WEIGHT: 251 LBS

## 2025-08-06 DIAGNOSIS — I34.0 NONRHEUMATIC MITRAL (VALVE) INSUFFICIENCY: ICD-10-CM

## 2025-08-06 DIAGNOSIS — I50.30 UNSPECIFIED DIASTOLIC (CONGESTIVE) HEART FAILURE: ICD-10-CM

## 2025-08-06 DIAGNOSIS — Z01.810 ENCOUNTER FOR PREPROCEDURAL CARDIOVASCULAR EXAMINATION: ICD-10-CM

## 2025-08-06 DIAGNOSIS — I10 ESSENTIAL (PRIMARY) HYPERTENSION: ICD-10-CM

## 2025-08-06 PROCEDURE — 99214 OFFICE O/P EST MOD 30 MIN: CPT

## 2025-08-06 PROCEDURE — G2211 COMPLEX E/M VISIT ADD ON: CPT

## 2025-08-06 PROCEDURE — 93000 ELECTROCARDIOGRAM COMPLETE: CPT | Mod: NC

## 2025-08-08 ENCOUNTER — OUTPATIENT (OUTPATIENT)
Dept: OUTPATIENT SERVICES | Facility: HOSPITAL | Age: 85
LOS: 1 days | End: 2025-08-08
Payer: MEDICARE

## 2025-08-08 VITALS
OXYGEN SATURATION: 97 % | TEMPERATURE: 98 F | SYSTOLIC BLOOD PRESSURE: 125 MMHG | DIASTOLIC BLOOD PRESSURE: 52 MMHG | HEART RATE: 55 BPM | RESPIRATION RATE: 17 BRPM | HEIGHT: 65 IN | WEIGHT: 249.12 LBS

## 2025-08-08 DIAGNOSIS — Z90.89 ACQUIRED ABSENCE OF OTHER ORGANS: Chronic | ICD-10-CM

## 2025-08-08 DIAGNOSIS — N39.3 STRESS INCONTINENCE (FEMALE) (MALE): ICD-10-CM

## 2025-08-08 DIAGNOSIS — Z98.890 OTHER SPECIFIED POSTPROCEDURAL STATES: Chronic | ICD-10-CM

## 2025-08-08 DIAGNOSIS — Z98.49 CATARACT EXTRACTION STATUS, UNSPECIFIED EYE: Chronic | ICD-10-CM

## 2025-08-08 DIAGNOSIS — Z41.9 ENCOUNTER FOR PROCEDURE FOR PURPOSES OTHER THAN REMEDYING HEALTH STATE, UNSPECIFIED: Chronic | ICD-10-CM

## 2025-08-08 DIAGNOSIS — Z01.818 ENCOUNTER FOR OTHER PREPROCEDURAL EXAMINATION: ICD-10-CM

## 2025-08-08 LAB
ANION GAP SERPL CALC-SCNC: 4 MMOL/L — LOW (ref 5–17)
APPEARANCE UR: CLEAR — SIGNIFICANT CHANGE UP
BASOPHILS # BLD AUTO: 0.05 K/UL — SIGNIFICANT CHANGE UP (ref 0–0.2)
BASOPHILS NFR BLD AUTO: 0.6 % — SIGNIFICANT CHANGE UP (ref 0–2)
BILIRUB UR-MCNC: NEGATIVE — SIGNIFICANT CHANGE UP
BLD GP AB SCN SERPL QL: SIGNIFICANT CHANGE UP
BUN SERPL-MCNC: 10 MG/DL — SIGNIFICANT CHANGE UP (ref 7–23)
CALCIUM SERPL-MCNC: 9.5 MG/DL — SIGNIFICANT CHANGE UP (ref 8.5–10.1)
CHLORIDE SERPL-SCNC: 107 MMOL/L — SIGNIFICANT CHANGE UP (ref 96–108)
CO2 SERPL-SCNC: 31 MMOL/L — SIGNIFICANT CHANGE UP (ref 22–31)
COLOR SPEC: SIGNIFICANT CHANGE UP
CREAT SERPL-MCNC: 0.73 MG/DL — SIGNIFICANT CHANGE UP (ref 0.5–1.3)
DIFF PNL FLD: NEGATIVE — SIGNIFICANT CHANGE UP
EGFR: 81 ML/MIN/1.73M2 — SIGNIFICANT CHANGE UP
EGFR: 81 ML/MIN/1.73M2 — SIGNIFICANT CHANGE UP
EOSINOPHIL # BLD AUTO: 0.15 K/UL — SIGNIFICANT CHANGE UP (ref 0–0.5)
EOSINOPHIL NFR BLD AUTO: 1.9 % — SIGNIFICANT CHANGE UP (ref 0–6)
GLUCOSE SERPL-MCNC: 158 MG/DL — HIGH (ref 70–99)
GLUCOSE UR QL: >=1000 MG/DL
HCT VFR BLD CALC: 50.4 % — HIGH (ref 34.5–45)
HGB BLD-MCNC: 16.2 G/DL — HIGH (ref 11.5–15.5)
IMM GRANULOCYTES # BLD AUTO: 0.08 K/UL — HIGH (ref 0–0.07)
IMM GRANULOCYTES NFR BLD AUTO: 1 % — HIGH (ref 0–0.9)
KETONES UR QL: ABNORMAL MG/DL
LEUKOCYTE ESTERASE UR-ACNC: NEGATIVE — SIGNIFICANT CHANGE UP
LYMPHOCYTES # BLD AUTO: 0.96 K/UL — LOW (ref 1–3.3)
LYMPHOCYTES NFR BLD AUTO: 12.2 % — LOW (ref 13–44)
MCHC RBC-ENTMCNC: 29 PG — SIGNIFICANT CHANGE UP (ref 27–34)
MCHC RBC-ENTMCNC: 32.1 G/DL — SIGNIFICANT CHANGE UP (ref 32–36)
MCV RBC AUTO: 90.2 FL — SIGNIFICANT CHANGE UP (ref 80–100)
MONOCYTES # BLD AUTO: 0.52 K/UL — SIGNIFICANT CHANGE UP (ref 0–0.9)
MONOCYTES NFR BLD AUTO: 6.6 % — SIGNIFICANT CHANGE UP (ref 2–14)
NEUTROPHILS # BLD AUTO: 6.14 K/UL — SIGNIFICANT CHANGE UP (ref 1.8–7.4)
NEUTROPHILS NFR BLD AUTO: 77.7 % — HIGH (ref 43–77)
NITRITE UR-MCNC: NEGATIVE — SIGNIFICANT CHANGE UP
NRBC # BLD AUTO: 0 K/UL — SIGNIFICANT CHANGE UP (ref 0–0)
NRBC # FLD: 0 K/UL — SIGNIFICANT CHANGE UP (ref 0–0)
NRBC BLD AUTO-RTO: 0 /100 WBCS — SIGNIFICANT CHANGE UP (ref 0–0)
PH UR: 6 — SIGNIFICANT CHANGE UP (ref 5–8)
PLATELET # BLD AUTO: 163 K/UL — SIGNIFICANT CHANGE UP (ref 150–400)
PMV BLD: 11.9 FL — SIGNIFICANT CHANGE UP (ref 7–13)
POTASSIUM SERPL-MCNC: 3.8 MMOL/L — SIGNIFICANT CHANGE UP (ref 3.5–5.3)
POTASSIUM SERPL-SCNC: 3.8 MMOL/L — SIGNIFICANT CHANGE UP (ref 3.5–5.3)
PROT UR-MCNC: NEGATIVE MG/DL — SIGNIFICANT CHANGE UP
RBC # BLD: 5.59 M/UL — HIGH (ref 3.8–5.2)
RBC # FLD: 14.6 % — HIGH (ref 10.3–14.5)
SODIUM SERPL-SCNC: 142 MMOL/L — SIGNIFICANT CHANGE UP (ref 135–145)
SP GR SPEC: >1.03 — HIGH (ref 1–1.03)
UROBILINOGEN FLD QL: 1 MG/DL — SIGNIFICANT CHANGE UP (ref 0.2–1)
WBC # BLD: 7.9 K/UL — SIGNIFICANT CHANGE UP (ref 3.8–10.5)
WBC # FLD AUTO: 7.9 K/UL — SIGNIFICANT CHANGE UP (ref 3.8–10.5)

## 2025-08-08 PROCEDURE — 36415 COLL VENOUS BLD VENIPUNCTURE: CPT

## 2025-08-08 PROCEDURE — 86900 BLOOD TYPING SEROLOGIC ABO: CPT

## 2025-08-08 PROCEDURE — 99214 OFFICE O/P EST MOD 30 MIN: CPT | Mod: 25

## 2025-08-08 PROCEDURE — 86850 RBC ANTIBODY SCREEN: CPT

## 2025-08-08 PROCEDURE — 85025 COMPLETE CBC W/AUTO DIFF WBC: CPT

## 2025-08-08 PROCEDURE — 87086 URINE CULTURE/COLONY COUNT: CPT

## 2025-08-08 PROCEDURE — 81003 URINALYSIS AUTO W/O SCOPE: CPT

## 2025-08-08 PROCEDURE — 80048 BASIC METABOLIC PNL TOTAL CA: CPT

## 2025-08-08 PROCEDURE — 86901 BLOOD TYPING SEROLOGIC RH(D): CPT

## 2025-08-09 DIAGNOSIS — N39.3 STRESS INCONTINENCE (FEMALE) (MALE): ICD-10-CM

## 2025-08-09 DIAGNOSIS — Z01.818 ENCOUNTER FOR OTHER PREPROCEDURAL EXAMINATION: ICD-10-CM

## 2025-08-09 LAB
CULTURE RESULTS: SIGNIFICANT CHANGE UP
SPECIMEN SOURCE: SIGNIFICANT CHANGE UP

## 2025-08-18 ENCOUNTER — OUTPATIENT (OUTPATIENT)
Dept: INPATIENT UNIT | Facility: HOSPITAL | Age: 85
LOS: 1 days | Discharge: HOME | End: 2025-08-18
Payer: MEDICARE

## 2025-08-18 ENCOUNTER — APPOINTMENT (OUTPATIENT)
Dept: UROGYNECOLOGY | Facility: HOSPITAL | Age: 85
End: 2025-08-18

## 2025-08-18 ENCOUNTER — TRANSCRIPTION ENCOUNTER (OUTPATIENT)
Age: 85
End: 2025-08-18

## 2025-08-18 VITALS
WEIGHT: 255.96 LBS | SYSTOLIC BLOOD PRESSURE: 146 MMHG | TEMPERATURE: 98 F | HEART RATE: 75 BPM | DIASTOLIC BLOOD PRESSURE: 71 MMHG | RESPIRATION RATE: 18 BRPM | HEIGHT: 62 IN | OXYGEN SATURATION: 95 %

## 2025-08-18 VITALS
SYSTOLIC BLOOD PRESSURE: 150 MMHG | HEART RATE: 61 BPM | DIASTOLIC BLOOD PRESSURE: 72 MMHG | TEMPERATURE: 97 F | OXYGEN SATURATION: 97 % | RESPIRATION RATE: 21 BRPM

## 2025-08-18 DIAGNOSIS — Z98.49 CATARACT EXTRACTION STATUS, UNSPECIFIED EYE: Chronic | ICD-10-CM

## 2025-08-18 DIAGNOSIS — Z98.890 OTHER SPECIFIED POSTPROCEDURAL STATES: Chronic | ICD-10-CM

## 2025-08-18 DIAGNOSIS — Z41.9 ENCOUNTER FOR PROCEDURE FOR PURPOSES OTHER THAN REMEDYING HEALTH STATE, UNSPECIFIED: Chronic | ICD-10-CM

## 2025-08-18 DIAGNOSIS — N39.3 STRESS INCONTINENCE (FEMALE) (MALE): ICD-10-CM

## 2025-08-18 DIAGNOSIS — Z90.89 ACQUIRED ABSENCE OF OTHER ORGANS: Chronic | ICD-10-CM

## 2025-08-18 PROCEDURE — 57288 REPAIR BLADDER DEFECT: CPT | Mod: GC

## 2025-08-18 PROCEDURE — L8606: CPT

## 2025-08-18 RX ORDER — NEBIVOLOL 2.5 MG/1
1 TABLET ORAL
Refills: 0 | DISCHARGE

## 2025-08-18 RX ORDER — FENTANYL CITRATE-0.9 % NACL/PF 100MCG/2ML
50 SYRINGE (ML) INTRAVENOUS
Refills: 0 | Status: DISCONTINUED | OUTPATIENT
Start: 2025-08-18 | End: 2025-08-18

## 2025-08-18 RX ORDER — EMPAGLIFLOZIN 25 MG/1
1 TABLET, FILM COATED ORAL
Refills: 0 | DISCHARGE

## 2025-08-18 RX ORDER — OXYCODONE HYDROCHLORIDE 30 MG/1
5 TABLET ORAL ONCE
Refills: 0 | Status: DISCONTINUED | OUTPATIENT
Start: 2025-08-18 | End: 2025-08-18

## 2025-08-18 RX ORDER — ROSUVASTATIN CALCIUM 20 MG/1
1 TABLET, FILM COATED ORAL
Refills: 0 | DISCHARGE

## 2025-08-18 RX ORDER — AMLODIPINE BESYLATE 10 MG/1
1 TABLET ORAL
Refills: 0 | DISCHARGE

## 2025-08-18 RX ORDER — SACUBITRIL AND VALSARTAN 6; 6 MG/1; MG/1
1 PELLET ORAL
Refills: 0 | DISCHARGE

## 2025-08-18 RX ORDER — SODIUM CHLORIDE 9 G/1000ML
1000 INJECTION, SOLUTION INTRAVENOUS
Refills: 0 | Status: DISCONTINUED | OUTPATIENT
Start: 2025-08-18 | End: 2025-08-18

## 2025-08-18 RX ORDER — ONDANSETRON HCL/PF 4 MG/2 ML
4 VIAL (ML) INJECTION ONCE
Refills: 0 | Status: DISCONTINUED | OUTPATIENT
Start: 2025-08-18 | End: 2025-08-18

## 2025-08-20 DIAGNOSIS — E66.01 MORBID (SEVERE) OBESITY DUE TO EXCESS CALORIES: ICD-10-CM

## 2025-08-20 DIAGNOSIS — G47.33 OBSTRUCTIVE SLEEP APNEA (ADULT) (PEDIATRIC): ICD-10-CM

## 2025-08-20 DIAGNOSIS — I50.9 HEART FAILURE, UNSPECIFIED: ICD-10-CM

## 2025-08-20 DIAGNOSIS — F41.0 PANIC DISORDER [EPISODIC PAROXYSMAL ANXIETY]: ICD-10-CM

## 2025-08-20 DIAGNOSIS — N39.3 STRESS INCONTINENCE (FEMALE) (MALE): ICD-10-CM

## 2025-08-20 DIAGNOSIS — I11.0 HYPERTENSIVE HEART DISEASE WITH HEART FAILURE: ICD-10-CM

## 2025-08-20 DIAGNOSIS — E11.9 TYPE 2 DIABETES MELLITUS WITHOUT COMPLICATIONS: ICD-10-CM

## 2025-08-20 DIAGNOSIS — I27.20 PULMONARY HYPERTENSION, UNSPECIFIED: ICD-10-CM

## 2025-08-20 DIAGNOSIS — E78.00 PURE HYPERCHOLESTEROLEMIA, UNSPECIFIED: ICD-10-CM

## 2025-08-20 DIAGNOSIS — E03.9 HYPOTHYROIDISM, UNSPECIFIED: ICD-10-CM

## 2025-08-22 ENCOUNTER — APPOINTMENT (OUTPATIENT)
Dept: CARDIOLOGY | Facility: CLINIC | Age: 85
End: 2025-08-22
Payer: MEDICARE

## 2025-08-22 PROCEDURE — 93306 TTE W/DOPPLER COMPLETE: CPT

## 2025-08-28 ENCOUNTER — RX RENEWAL (OUTPATIENT)
Age: 85
End: 2025-08-28

## 2025-09-04 ENCOUNTER — APPOINTMENT (OUTPATIENT)
Dept: UROGYNECOLOGY | Facility: CLINIC | Age: 85
End: 2025-09-04
Payer: MEDICARE

## 2025-09-04 ENCOUNTER — RESULT CHARGE (OUTPATIENT)
Age: 85
End: 2025-09-04

## 2025-09-04 VITALS
DIASTOLIC BLOOD PRESSURE: 72 MMHG | OXYGEN SATURATION: 91 % | BODY MASS INDEX: 44.22 KG/M2 | TEMPERATURE: 98.4 F | SYSTOLIC BLOOD PRESSURE: 118 MMHG | WEIGHT: 259 LBS | HEIGHT: 64 IN | HEART RATE: 68 BPM

## 2025-09-04 DIAGNOSIS — N39.3 STRESS INCONTINENCE (FEMALE) (MALE): ICD-10-CM

## 2025-09-04 DIAGNOSIS — R35.1 NOCTURIA: ICD-10-CM

## 2025-09-04 PROBLEM — G47.33 OBSTRUCTIVE SLEEP APNEA (ADULT) (PEDIATRIC): Chronic | Status: ACTIVE | Noted: 2025-08-18

## 2025-09-04 PROBLEM — I50.20 UNSPECIFIED SYSTOLIC (CONGESTIVE) HEART FAILURE: Chronic | Status: ACTIVE | Noted: 2025-08-08

## 2025-09-04 PROBLEM — I34.0 NONRHEUMATIC MITRAL (VALVE) INSUFFICIENCY: Chronic | Status: ACTIVE | Noted: 2025-08-18

## 2025-09-04 PROCEDURE — 51798 US URINE CAPACITY MEASURE: CPT

## 2025-09-04 PROCEDURE — 99024 POSTOP FOLLOW-UP VISIT: CPT

## 2025-09-04 RX ORDER — VIBEGRON 75 MG/1
75 TABLET, FILM COATED ORAL
Qty: 30 | Refills: 1 | Status: ACTIVE | COMMUNITY
Start: 2025-09-04 | End: 1900-01-01